# Patient Record
Sex: MALE | Race: WHITE | Employment: OTHER | URBAN - METROPOLITAN AREA
[De-identification: names, ages, dates, MRNs, and addresses within clinical notes are randomized per-mention and may not be internally consistent; named-entity substitution may affect disease eponyms.]

---

## 2017-02-13 PROBLEM — N52.9 VASCULOGENIC ERECTILE DYSFUNCTION: Status: ACTIVE | Noted: 2017-02-13

## 2018-12-20 ENCOUNTER — TELEPHONE (OUTPATIENT)
Dept: CASE MANAGEMENT | Age: 53
End: 2018-12-20

## 2018-12-20 NOTE — TELEPHONE ENCOUNTER
Outreach phone call to patient for the purposes of discussing their overdue status for colorectal cancer screening. We discussed the importance of this screening, and options for screening. This patient already has a screening kit in their home from a previous order. Discussed with patient the importance of completing the kit and mailing it back in.

## 2020-08-26 PROBLEM — E66.01 SEVERE OBESITY (HCC): Status: ACTIVE | Noted: 2020-08-26

## 2020-09-03 ENCOUNTER — HOSPITAL ENCOUNTER (OUTPATIENT)
Dept: LAB | Age: 55
Discharge: HOME | End: 2020-09-03
Payer: MEDICARE

## 2020-09-03 DIAGNOSIS — E78.5 DYSLIPIDEMIA: Chronic | ICD-10-CM

## 2020-09-03 LAB
CHOLEST SERPL-MCNC: 227 MG/DL
HDLC SERPL-MCNC: 36 MG/DL (ref 40–60)
HDLC SERPL: 6.3 {RATIO}
LDLC SERPL CALC-MCNC: 158.8 MG/DL
LIPID PROFILE,FLP: ABNORMAL
TRIGL SERPL-MCNC: 161 MG/DL (ref 35–150)
VLDLC SERPL CALC-MCNC: 32.2 MG/DL (ref 6–23)

## 2020-09-03 PROCEDURE — 36415 COLL VENOUS BLD VENIPUNCTURE: CPT

## 2020-09-03 PROCEDURE — 80061 LIPID PANEL: CPT

## 2021-04-16 ENCOUNTER — HOSPITAL ENCOUNTER (INPATIENT)
Age: 56
LOS: 7 days | Discharge: HOME HEALTH | DRG: 234 | End: 2021-04-23
Attending: INTERNAL MEDICINE | Admitting: THORACIC SURGERY (CARDIOTHORACIC VASCULAR SURGERY)
Payer: MEDICARE

## 2021-04-16 DIAGNOSIS — Z95.1 S/P CABG X 3: ICD-10-CM

## 2021-04-16 DIAGNOSIS — I25.110 CORONARY ARTERY DISEASE INVOLVING NATIVE CORONARY ARTERY OF NATIVE HEART WITH UNSTABLE ANGINA PECTORIS (HCC): Chronic | ICD-10-CM

## 2021-04-16 DIAGNOSIS — J98.11 ATELECTASIS, BILATERAL: ICD-10-CM

## 2021-04-16 DIAGNOSIS — I11.0 HYPERTENSIVE HEART DISEASE WITH HEART FAILURE (HCC): Chronic | ICD-10-CM

## 2021-04-16 DIAGNOSIS — Z99.11 ENCOUNTER FOR WEANING FROM VENTILATOR (HCC): ICD-10-CM

## 2021-04-16 DIAGNOSIS — I25.5 ISCHEMIC CARDIOMYOPATHY: ICD-10-CM

## 2021-04-16 PROBLEM — I21.3 STEMI (ST ELEVATION MYOCARDIAL INFARCTION) (HCC): Status: ACTIVE | Noted: 2021-04-16

## 2021-04-16 PROBLEM — I10 HYPERTENSION: Status: ACTIVE | Noted: 2021-04-16

## 2021-04-16 LAB
ALBUMIN SERPL-MCNC: 2.7 G/DL (ref 3.5–5)
ALBUMIN/GLOB SERPL: 0.6 {RATIO} (ref 1.2–3.5)
ALP SERPL-CCNC: 58 U/L (ref 50–136)
ALT SERPL-CCNC: 48 U/L (ref 12–65)
ANION GAP SERPL CALC-SCNC: 7 MMOL/L (ref 7–16)
AST SERPL-CCNC: 33 U/L (ref 15–37)
BASOPHILS # BLD: 0 K/UL (ref 0–0.2)
BASOPHILS NFR BLD: 0 % (ref 0–2)
BILIRUB SERPL-MCNC: 0.2 MG/DL (ref 0.2–1.1)
BUN SERPL-MCNC: 13 MG/DL (ref 6–23)
CALCIUM SERPL-MCNC: 8.4 MG/DL (ref 8.3–10.4)
CHLORIDE SERPL-SCNC: 110 MMOL/L (ref 98–107)
CO2 SERPL-SCNC: 24 MMOL/L (ref 21–32)
CREAT SERPL-MCNC: 0.74 MG/DL (ref 0.8–1.5)
DIFFERENTIAL METHOD BLD: ABNORMAL
EOSINOPHIL # BLD: 0 K/UL (ref 0–0.8)
EOSINOPHIL NFR BLD: 0 % (ref 0.5–7.8)
ERYTHROCYTE [DISTWIDTH] IN BLOOD BY AUTOMATED COUNT: 13.7 % (ref 11.9–14.6)
GLOBULIN SER CALC-MCNC: 4.2 G/DL (ref 2.3–3.5)
GLUCOSE SERPL-MCNC: 154 MG/DL (ref 65–100)
HCT VFR BLD AUTO: 37.4 % (ref 41.1–50.3)
HGB BLD-MCNC: 12.1 G/DL (ref 13.6–17.2)
IMM GRANULOCYTES # BLD AUTO: 0 K/UL (ref 0–0.5)
IMM GRANULOCYTES NFR BLD AUTO: 0 % (ref 0–5)
LYMPHOCYTES # BLD: 2.4 K/UL (ref 0.5–4.6)
LYMPHOCYTES NFR BLD: 31 % (ref 13–44)
MCH RBC QN AUTO: 29.2 PG (ref 26.1–32.9)
MCHC RBC AUTO-ENTMCNC: 32.4 G/DL (ref 31.4–35)
MCV RBC AUTO: 90.1 FL (ref 79.6–97.8)
MONOCYTES # BLD: 0.7 K/UL (ref 0.1–1.3)
MONOCYTES NFR BLD: 9 % (ref 4–12)
NEUTS SEG # BLD: 4.5 K/UL (ref 1.7–8.2)
NEUTS SEG NFR BLD: 60 % (ref 43–78)
NRBC # BLD: 0 K/UL (ref 0–0.2)
PLATELET # BLD AUTO: 249 K/UL (ref 150–450)
PMV BLD AUTO: 8.7 FL (ref 9.4–12.3)
POTASSIUM SERPL-SCNC: 3.6 MMOL/L (ref 3.5–5.1)
PROT SERPL-MCNC: 6.9 G/DL (ref 6.3–8.2)
RBC # BLD AUTO: 4.15 M/UL (ref 4.23–5.6)
SODIUM SERPL-SCNC: 141 MMOL/L (ref 136–145)
TROPONIN-HIGH SENSITIVITY: ABNORMAL PG/ML (ref 0–14)
UFH PPP CHRO-ACNC: 0.16 IU/ML (ref 0.3–0.7)
WBC # BLD AUTO: 7.6 K/UL (ref 4.3–11.1)

## 2021-04-16 PROCEDURE — C1760 CLOSURE DEV, VASC: HCPCS

## 2021-04-16 PROCEDURE — B2181ZZ FLUOROSCOPY OF LEFT INTERNAL MAMMARY BYPASS GRAFT USING LOW OSMOLAR CONTRAST: ICD-10-PCS | Performed by: INTERNAL MEDICINE

## 2021-04-16 PROCEDURE — 85520 HEPARIN ASSAY: CPT

## 2021-04-16 PROCEDURE — 84484 ASSAY OF TROPONIN QUANT: CPT

## 2021-04-16 PROCEDURE — 74011250637 HC RX REV CODE- 250/637: Performed by: PHYSICIAN ASSISTANT

## 2021-04-16 PROCEDURE — 93459 L HRT ART/GRFT ANGIO: CPT

## 2021-04-16 PROCEDURE — C8929 TTE W OR WO FOL WCON,DOPPLER: HCPCS

## 2021-04-16 PROCEDURE — 65610000006 HC RM INTENSIVE CARE

## 2021-04-16 PROCEDURE — 2709999900 HC NON-CHARGEABLE SUPPLY

## 2021-04-16 PROCEDURE — 77030016699 HC CATH ANGI DX INFN1 CARD -A

## 2021-04-16 PROCEDURE — 74011000636 HC RX REV CODE- 636: Performed by: INTERNAL MEDICINE

## 2021-04-16 PROCEDURE — 74011250636 HC RX REV CODE- 250/636: Performed by: INTERNAL MEDICINE

## 2021-04-16 PROCEDURE — 93459 L HRT ART/GRFT ANGIO: CPT | Performed by: INTERNAL MEDICINE

## 2021-04-16 PROCEDURE — B2111ZZ FLUOROSCOPY OF MULTIPLE CORONARY ARTERIES USING LOW OSMOLAR CONTRAST: ICD-10-PCS | Performed by: INTERNAL MEDICINE

## 2021-04-16 PROCEDURE — 77010033678 HC OXYGEN DAILY

## 2021-04-16 PROCEDURE — 93458 L HRT ARTERY/VENTRICLE ANGIO: CPT

## 2021-04-16 PROCEDURE — 99153 MOD SED SAME PHYS/QHP EA: CPT

## 2021-04-16 PROCEDURE — 74011250636 HC RX REV CODE- 250/636: Performed by: NURSE PRACTITIONER

## 2021-04-16 PROCEDURE — 85025 COMPLETE CBC W/AUTO DIFF WBC: CPT

## 2021-04-16 PROCEDURE — 4A023N7 MEASUREMENT OF CARDIAC SAMPLING AND PRESSURE, LEFT HEART, PERCUTANEOUS APPROACH: ICD-10-PCS | Performed by: INTERNAL MEDICINE

## 2021-04-16 PROCEDURE — B2151ZZ FLUOROSCOPY OF LEFT HEART USING LOW OSMOLAR CONTRAST: ICD-10-PCS | Performed by: INTERNAL MEDICINE

## 2021-04-16 PROCEDURE — 74011250636 HC RX REV CODE- 250/636: Performed by: PHYSICIAN ASSISTANT

## 2021-04-16 PROCEDURE — 99152 MOD SED SAME PHYS/QHP 5/>YRS: CPT

## 2021-04-16 PROCEDURE — 80053 COMPREHEN METABOLIC PANEL: CPT

## 2021-04-16 PROCEDURE — 74011000250 HC RX REV CODE- 250: Performed by: INTERNAL MEDICINE

## 2021-04-16 PROCEDURE — 99152 MOD SED SAME PHYS/QHP 5/>YRS: CPT | Performed by: INTERNAL MEDICINE

## 2021-04-16 PROCEDURE — 75810000275 HC EMERGENCY DEPT VISIT NO LEVEL OF CARE

## 2021-04-16 PROCEDURE — C1894 INTRO/SHEATH, NON-LASER: HCPCS

## 2021-04-16 PROCEDURE — 99223 1ST HOSP IP/OBS HIGH 75: CPT | Performed by: INTERNAL MEDICINE

## 2021-04-16 RX ORDER — CARVEDILOL 3.12 MG/1
3.12 TABLET ORAL 2 TIMES DAILY
Status: DISCONTINUED | OUTPATIENT
Start: 2021-04-16 | End: 2021-04-17

## 2021-04-16 RX ORDER — LORAZEPAM 2 MG/ML
.5-1 INJECTION INTRAMUSCULAR
Status: DISCONTINUED | OUTPATIENT
Start: 2021-04-16 | End: 2021-04-19

## 2021-04-16 RX ORDER — NITROGLYCERIN 0.4 MG/1
0.4 TABLET SUBLINGUAL
Status: DISCONTINUED | OUTPATIENT
Start: 2021-04-16 | End: 2021-04-19

## 2021-04-16 RX ORDER — MORPHINE SULFATE 2 MG/ML
2 INJECTION, SOLUTION INTRAMUSCULAR; INTRAVENOUS
Status: DISCONTINUED | OUTPATIENT
Start: 2021-04-16 | End: 2021-04-19

## 2021-04-16 RX ORDER — SODIUM CHLORIDE 0.9 % (FLUSH) 0.9 %
5-40 SYRINGE (ML) INJECTION AS NEEDED
Status: DISCONTINUED | OUTPATIENT
Start: 2021-04-16 | End: 2021-04-20

## 2021-04-16 RX ORDER — FAMOTIDINE 20 MG/1
20 TABLET, FILM COATED ORAL 2 TIMES DAILY
Status: DISCONTINUED | OUTPATIENT
Start: 2021-04-17 | End: 2021-04-17

## 2021-04-16 RX ORDER — ASPIRIN 81 MG/1
81 TABLET ORAL DAILY
Status: DISCONTINUED | OUTPATIENT
Start: 2021-04-17 | End: 2021-04-23 | Stop reason: HOSPADM

## 2021-04-16 RX ORDER — ATORVASTATIN CALCIUM 80 MG/1
80 TABLET, FILM COATED ORAL DAILY
Status: DISCONTINUED | OUTPATIENT
Start: 2021-04-17 | End: 2021-04-19

## 2021-04-16 RX ORDER — HYDROCODONE BITARTRATE AND ACETAMINOPHEN 7.5; 325 MG/1; MG/1
1 TABLET ORAL
Status: DISCONTINUED | OUTPATIENT
Start: 2021-04-16 | End: 2021-04-19

## 2021-04-16 RX ORDER — LOSARTAN POTASSIUM 25 MG/1
25 TABLET ORAL DAILY
Status: DISCONTINUED | OUTPATIENT
Start: 2021-04-17 | End: 2021-04-23 | Stop reason: HOSPADM

## 2021-04-16 RX ORDER — LIDOCAINE HYDROCHLORIDE 10 MG/ML
1-20 INJECTION, SOLUTION EPIDURAL; INFILTRATION; INTRACAUDAL; PERINEURAL ONCE
Status: COMPLETED | OUTPATIENT
Start: 2021-04-16 | End: 2021-04-16

## 2021-04-16 RX ORDER — HEPARIN SODIUM 5000 [USP'U]/100ML
12-25 INJECTION, SOLUTION INTRAVENOUS
Status: DISCONTINUED | OUTPATIENT
Start: 2021-04-16 | End: 2021-04-19

## 2021-04-16 RX ORDER — HEPARIN SODIUM 5000 [USP'U]/ML
60 INJECTION, SOLUTION INTRAVENOUS; SUBCUTANEOUS ONCE
Status: COMPLETED | OUTPATIENT
Start: 2021-04-16 | End: 2021-04-16

## 2021-04-16 RX ORDER — NITROGLYCERIN 20 MG/100ML
0-20 INJECTION INTRAVENOUS
Status: DISCONTINUED | OUTPATIENT
Start: 2021-04-16 | End: 2021-04-16 | Stop reason: SDUPTHER

## 2021-04-16 RX ORDER — MIDAZOLAM HYDROCHLORIDE 1 MG/ML
.5-5 INJECTION, SOLUTION INTRAMUSCULAR; INTRAVENOUS
Status: DISCONTINUED | OUTPATIENT
Start: 2021-04-16 | End: 2021-04-16

## 2021-04-16 RX ORDER — HEPARIN SODIUM 5000 [USP'U]/100ML
12-25 INJECTION, SOLUTION INTRAVENOUS
Status: DISCONTINUED | OUTPATIENT
Start: 2021-04-16 | End: 2021-04-16 | Stop reason: SDUPTHER

## 2021-04-16 RX ORDER — AMIODARONE HYDROCHLORIDE 200 MG/1
600 TABLET ORAL EVERY 12 HOURS
Status: COMPLETED | OUTPATIENT
Start: 2021-04-18 | End: 2021-04-19

## 2021-04-16 RX ORDER — HEPARIN SODIUM 5000 [USP'U]/ML
20 INJECTION, SOLUTION INTRAVENOUS; SUBCUTANEOUS ONCE
Status: COMPLETED | OUTPATIENT
Start: 2021-04-16 | End: 2021-04-16

## 2021-04-16 RX ORDER — CEFAZOLIN SODIUM/WATER 2 G/20 ML
2 SYRINGE (ML) INTRAVENOUS
Status: COMPLETED | OUTPATIENT
Start: 2021-04-19 | End: 2021-04-19

## 2021-04-16 RX ORDER — NITROGLYCERIN 20 MG/100ML
0-200 INJECTION INTRAVENOUS
Status: DISCONTINUED | OUTPATIENT
Start: 2021-04-16 | End: 2021-04-20 | Stop reason: SDUPTHER

## 2021-04-16 RX ORDER — HEPARIN SODIUM 200 [USP'U]/100ML
2 INJECTION, SOLUTION INTRAVENOUS CONTINUOUS
Status: DISCONTINUED | OUTPATIENT
Start: 2021-04-16 | End: 2021-04-20

## 2021-04-16 RX ORDER — ACETAMINOPHEN 325 MG/1
650 TABLET ORAL
Status: DISCONTINUED | OUTPATIENT
Start: 2021-04-16 | End: 2021-04-19

## 2021-04-16 RX ADMIN — HYDROCODONE BITARTRATE AND ACETAMINOPHEN 1 TABLET: 7.5; 325 TABLET ORAL at 11:59

## 2021-04-16 RX ADMIN — NITROGLYCERIN 10 MCG/MIN: 20 INJECTION INTRAVENOUS at 10:49

## 2021-04-16 RX ADMIN — HYDROCODONE BITARTRATE AND ACETAMINOPHEN 1 TABLET: 7.5; 325 TABLET ORAL at 21:09

## 2021-04-16 RX ADMIN — IOPAMIDOL 90 ML: 755 INJECTION, SOLUTION INTRAVENOUS at 10:49

## 2021-04-16 RX ADMIN — HEPARIN SODIUM 6000 UNITS: 5000 INJECTION INTRAVENOUS; SUBCUTANEOUS at 13:20

## 2021-04-16 RX ADMIN — CARVEDILOL 3.12 MG: 3.12 TABLET, FILM COATED ORAL at 17:12

## 2021-04-16 RX ADMIN — HYDROCODONE BITARTRATE AND ACETAMINOPHEN 1 TABLET: 7.5; 325 TABLET ORAL at 17:12

## 2021-04-16 RX ADMIN — HEPARIN SODIUM 2000 UNITS: 5000 INJECTION INTRAVENOUS; SUBCUTANEOUS at 21:08

## 2021-04-16 RX ADMIN — HEPARIN SODIUM 12 UNITS/KG/HR: 5000 INJECTION, SOLUTION INTRAVENOUS at 10:48

## 2021-04-16 RX ADMIN — MIDAZOLAM 2 MG: 1 INJECTION INTRAMUSCULAR; INTRAVENOUS at 10:31

## 2021-04-16 RX ADMIN — MORPHINE SULFATE 2 MG: 2 INJECTION, SOLUTION INTRAMUSCULAR; INTRAVENOUS at 18:15

## 2021-04-16 RX ADMIN — PERFLUTREN 1 ML: 6.52 INJECTION, SUSPENSION INTRAVENOUS at 14:00

## 2021-04-16 RX ADMIN — LIDOCAINE HYDROCHLORIDE 10 ML: 10 INJECTION, SOLUTION EPIDURAL; INFILTRATION; INTRACAUDAL; PERINEURAL at 10:30

## 2021-04-16 RX ADMIN — LORAZEPAM 1 MG: 2 INJECTION INTRAMUSCULAR; INTRAVENOUS at 21:41

## 2021-04-16 RX ADMIN — HEPARIN SODIUM 2 UNITS/HR: 5000 INJECTION, SOLUTION INTRAVENOUS; SUBCUTANEOUS at 10:30

## 2021-04-16 NOTE — PROGRESS NOTES
Dual skin assessment completed upon pt's arrival to unit. Sacrum and heels with no redness or breakdown noted. R groin puncture site with no bleeding or hematoma noted. 4x4 and tegaderm dressing c/d/i. Multiple tattoos, no other abnormalities noted.

## 2021-04-16 NOTE — PROGRESS NOTES
Kimberly Huerta. Melba Sheer, MD Arna Fabry. Gilford Heady, MD           Consult Note    Kym Suazo         4/16/2021 1965    REFERRING PHYSICIAN:  Dr. Shashi Feldman:  The patient is a 61 y.o. male who was admitted for STEMI (ST elevation myocardial infarction) ([facility] [address] [I21.3]. He presented to urgent care this morning. He states chest pain started on Monday. He attributed symptoms to GERD and did not think it was his heart. He also had an intermittent fever and just didn't feel right. He had a COVID test which was negative. He continued to have intermittent chest discomfort. He call [facility] who recommended either urgent care or ER for evaluation. This morning, he had more severe chest pain. He presented to urgent care where EKG showed inferior ST elevation. EMS was summoned and [place] [facility] He underwent emergent cardiac catheterization that showed severe LM stenosis as well as high grade stenosis is the LCx and RCA. His LIMA to LAD was patent. He had off pump LIMA to LAD in 1995 in setting of MI and has had PCI since. He is followed in the office by Dr Rosalba Santiago.  Echo in 9/20 showed reduced LV EF at 35-40%    Risk factors include HTN, dyslipidemia    ** No history of stroke, TIA, prior vascular surgery, anesthetic complication, lung disease, DVT or PE, GI bleeding       Past Medical History:   Diagnosis Date    CAD (coronary artery disease)     MI 1997, CABG 1997, STENTS 2009    Chest pain 4/29/2016    Chronic pain     DizzinessVertigo 4/29/2016    GERD (gastroesophageal reflux disease)     Heart failure ([facility] [address]     Myocardial infarction, anterior wall/, subsequent ([address] 4/29/2016    Other ill-defined conditions(799.89)     ISCHEMIC CARDIOMYOPATHY EF 35% 2010    Unstable angina ([address] 12/13/2010    Unstale Angina Acute coronary syndrome ([address] 4/29/2016       Past Surgical History:   Procedure Laterality Date    CARDIAC SURG PROCEDURE UNLIST      cabg x1    CARDIAC SURG PROCEDURE UNLIST      stent x4    HX HEART CATHETERIZATION  7/18/2012    no intervention    HX ORTHOPAEDIC      plate n screws in L arm  surgery on R leg       Family History   Problem Relation Age of Onset    Heart Disease Mother [de-identified]        STENTS HEART    Diabetes Mother     Heart Disease Father     Cancer Father         lung and prostate cancer    Diabetes Father     Heart Disease Brother     Diabetes Maternal Grandmother     Hypertension Maternal Grandmother     Heart Disease Paternal Grandfather        Social History     Socioeconomic History    Marital status:      Spouse name: Not on file    Number of children: Not on file    Years of education: Not on file    Highest education level: Not on file   Occupational History    Not on file   Social Needs    Financial resource strain: Not on file    Food insecurity     Worry: Not on file     Inability: Not on file    Transportation needs     Medical: Not on file     Non-medical: Not on file   Tobacco Use    Smoking status: Never Smoker    Smokeless tobacco: Never Used   Substance and Sexual Activity    Alcohol use: Yes     Comment: social    Drug use: No    Sexual activity: Never   Lifestyle    Physical activity     Days per week: Not on file     Minutes per session: Not on file    Stress: Not on file   Relationships    Social connections     Talks on phone: Not on file     Gets together: Not on file     Attends Hoahaoism service: Not on file     Active member of club or organization: Not on file     Attends meetings of clubs or organizations: Not on file     Relationship status: Not on file    Intimate partner violence     Fear of current or ex partner: Not on file     Emotionally abused: Not on file     Physically abused: Not on file     Forced sexual activity: Not on file   Other Topics Concern    Not on file   Social History Narrative    Not on file       Allergies   Allergen Reactions    Codeine Rash    Nexium [Esomeprazole Magnesium] Other (comments)     headache    Niacin Other (comments)     SEVERE FLUSHING         No current facility-administered medications on file prior to encounter. Current Outpatient Medications on File Prior to Encounter   Medication Sig Dispense Refill    aspirin delayed-release 81 mg tablet Take  by mouth daily.  nebivoloL (BYSTOLIC) 2.5 mg tablet Take 1 Tab by mouth daily. 30 Tab 11    sildenafil citrate (Viagra) 50 mg tablet Take 50 mg by mouth as needed for Erectile Dysfunction.  nitroglycerin (NITROSTAT) 0.4 mg SL tablet 1 Tab by SubLINGual route every five (5) minutes as needed (If no relief after 3rd tab call 911). 100 Tab 0    atorvastatin (Lipitor) 80 mg tablet Take 1 Tab by mouth daily. 90 Tab 3    losartan (COZAAR) 25 mg tablet Take 1 Tab by mouth daily. 30 Tab 11    multivitamin capsule Take 1 Cap by mouth daily. REVIEW OF SYSTEMS:  Review of Systems   Constitution: Positive for chills, fever and malaise/fatigue. Negative for weight gain and weight loss. HENT: Negative for ear pain, hearing loss, nosebleeds, sore throat and tinnitus. Eyes: Negative for blurred vision, vision loss in left eye and vision loss in right eye. Cardiovascular: Positive for chest pain. Negative for dyspnea on exertion, leg swelling, near-syncope, orthopnea, palpitations, paroxysmal nocturnal dyspnea and syncope. Respiratory: Negative for cough, hemoptysis, shortness of breath, sputum production and wheezing. Endocrine: Negative for cold intolerance, heat intolerance and polydipsia. Hematologic/Lymphatic: Does not bruise/bleed easily. Skin: Negative for color change and rash. Musculoskeletal: Negative for back pain, joint pain, joint swelling and myalgias. Gastrointestinal: Negative for abdominal pain, constipation, diarrhea, dysphagia, heartburn, hematemesis, melena, nausea and vomiting.    Genitourinary: Negative for dysuria, frequency, hematuria and urgency. Neurological: Negative for difficulty with concentration, dizziness, headaches, light-headedness, numbness, paresthesias, seizures, vertigo and weakness. Psychiatric/Behavioral: Negative for altered mental status and depression. Physical Exam  Vitals:    04/16/21 1230 04/16/21 1245 04/16/21 1300 04/16/21 1320   BP: 115/77 115/80 (!) 129/93 118/80   Pulse: 86 87 82 81   Resp:    26   Temp:    98 °F (36.7 °C)   SpO2: 97% 97% 97% 98%   Weight:       Height:           Physical Exam:  General: Well Developed, Well Nourished, No Acute Distress  HEENT: Normocephalic, pupils equal and round, no scleral icterus  Neck: supple, no JVD  Chest wall: No deformity  Heart: S1S2 with RRR without murmurs or gallops  Lungs: Clear throughout auscultation bilaterally  Vascular: Pulses are full and equal. There is no venous stasis disease. Abd: soft, nontender, nondistended, with good bowel sounds, no pulsatile masses  Ext: warm, no edema, calves supple/nontender, pulses 2+ bilaterally  Skin: warm and dry, no rashes, cyanosis, jaundice, ecchymoses or evidence of skin breakdown  Psychiatric: Normal mood and affect  Neurologic: Alert and oriented X 3, no focal deficit noted    Labs: pending    Assessment:     Principal Problem:    STEMI (ST elevation myocardial infarction) ([facility] [address] (4/16/2021)  Active Problems:    CAD (coronary artery disease), native coronary artery (12/18/2009)    Ischemic cardiomyopathy (9/19/2016)    Hypertension (4/16/2021)    Dyslipidemia      Plan:     Trena Chand is to see preoperative teaching film that thoroughly discusses procedure, risks, and possible complications. Risks, benefits, and alternatives were discussed to include, but not limited to:     1. Bleeding  2. Arrhythmia   3. Infection including mediastinitis  4. Myocardial infarction  5. Need for reoperation  6. Renal failure  7. Respiratory failure  8. Stroke  9. Potential death    Repeat echocardiogram is pending.    He will be monitored closely in the CVICU with redo CABG surgery planned for Monday.        Mehrdad Romero MD

## 2021-04-16 NOTE — PROGRESS NOTES
TRANSFER - OUT REPORT:  STEMI/LHC by Dr. Nathan Valiente  Right femoral access  No interventions  CT Surgery consulted  RFA closed with angioseal  Versed 2mg IV  Nitro gtt started @10mcg/min  Heparin gtt started @ 12units/kg/hr  Access site soft clean, dry, and intact; with no signs of bleeding or hematoma  Pt. Tolerated procedure  Reports chest pain @ zero/10  Verbal report given to rossy(name) on Feldman Meet  being transferred to [facility]U(unit) for routine progression of care       Report consisted of patients Situation, Background, Assessment and   Recommendations(SBAR). Information from the following report(s) Procedure Summary and MAR was reviewed with the receiving nurse. Lines:   Peripheral IV 04/16/21 (Active)        Opportunity for questions and clarification was provided.       Patient transported with:   Monitor  Registered Nurse  Tech

## 2021-04-16 NOTE — PROGRESS NOTES
Chart screened by  for discharge planning. Pt with CV surgery scheduled for tentatively Monday 4/18. No needs identified at this time. Please consult  if any new issues arise.       Care Management Interventions  Transition of Care Consult (CM Consult): Discharge Planning

## 2021-04-16 NOTE — PROGRESS NOTES
TRANSFER - IN REPORT:    Verbal report received from [facility], RN on Ricki Philip  being received from  for routine progression of care      Report consisted of patients Situation, Background, Assessment and   Recommendations(SBAR). Information from the following report(s) SBAR, Procedure Summary, MAR and Recent Results was reviewed with the receiving nurse. Opportunity for questions and clarification was provided. Assessment completed upon patients arrival to unit and care assumed. Right groin dry and intact. No hematoma noted. Patient is alert and oriented.

## 2021-04-16 NOTE — PROGRESS NOTES
TRANSFER - OUT REPORT:    Verbal report given to [facility], [facility], RN on Rk Grief  being transferred to CVICU for routine progression of care       Report consisted of patients Situation, Background, Assessment and   Recommendations(SBAR). Information from the following report(s) SBAR, Procedure Summary, MAR and Recent Results was reviewed with the receiving nurse. Lines:   Peripheral IV 04/16/21 (Active)        Opportunity for questions and clarification was provided. Patient transported with:   Registered Nurse          Right groin dressing dry and intact. No hematoma noted. Patient is alert and oriented.

## 2021-04-16 NOTE — PROGRESS NOTES
Claudia Stallings. MD Missy Gutiérrez. Rain Ramos MD           MANAGEMENT PLAN    [place] Pr[place]         4/16/2021 1965    REFERRING PHYSICIAN:  Dr. Herman Santillan:  The patient is a 61 y.o. male who was admitted for STEMI (ST elevation myocardial infarction) ([address] [I21.3]. He presented to urgent care this morning. He states chest pain started on Monday. He attributed symptoms to GERD and did not think it was his heart. He also had an intermittent fever and just didn't feel right. He had a COVID test which was negative. He continued to have intermittent chest discomfort. He call [facility] who recommended either urgent care or ER for evaluation. This morning, he had more severe chest pain. He presented to urgent care where EKG showed inferior ST elevation. EMS was summoned and he was transported to [facility] [place]. He underwent emergent cardiac catheterization that showed severe LM stenosis as well as high grade stenosis is the LCx and RCA. His LIMA to LAD was patent. He had off pump LIMA to LAD in 1995 in setting of MI and has had PCI since. He is followed in the office by Dr Zeus Tan.  Echo in 9/20 showed reduced LV EF at 35-40%    Risk factors include HTN, dyslipidemia    ** No history of stroke, TIA, prior vascular surgery, anesthetic complication, lung disease, DVT or PE, GI bleeding       Past Medical History:   Diagnosis Date    CAD (coronary artery disease)     MI 1997, CABG 1997, STENTS 2009    Chest pain 4/29/2016    Chronic pain     DizzinessVertigo 4/29/2016    GERD (gastroesophageal reflux disease)     Heart failure ([address]     Myocardial infarction, anterior wall/, subsequent ([address] 4/29/2016    Other ill-defined conditions(799.89)     ISCHEMIC CARDIOMYOPATHY EF 35% 2010    Unstable angina ([address] 12/13/2010    Unstale Angina Acute coronary syndrome ([address] 4/29/2016       Past Surgical History:   Procedure Laterality Date    CARDIAC SURG PROCEDURE UNLIST      cabg x1  CARDIAC SURG PROCEDURE UNLIST      stent x4    HX HEART CATHETERIZATION  7/18/2012    no intervention    HX ORTHOPAEDIC      plate n screws in L arm  surgery on R leg       Family History   Problem Relation Age of Onset    Heart Disease Mother [de-identified]        STENTS HEART    Diabetes Mother     Heart Disease Father     Cancer Father         lung and prostate cancer    Diabetes Father     Heart Disease Brother     Diabetes Maternal Grandmother     Hypertension Maternal Grandmother     Heart Disease Paternal Grandfather        Social History     Socioeconomic History    Marital status:      Spouse name: Not on file    Number of children: Not on file    Years of education: Not on file    Highest education level: Not on file   Occupational History    Not on file   Social Needs    Financial resource strain: Not on file    Food insecurity     Worry: Not on file     Inability: Not on file    Transportation needs     Medical: Not on file     Non-medical: Not on file   Tobacco Use    Smoking status: Never Smoker    Smokeless tobacco: Never Used   Substance and Sexual Activity    Alcohol use: Yes     Comment: social    Drug use: No    Sexual activity: Never   Lifestyle    Physical activity     Days per week: Not on file     Minutes per session: Not on file    Stress: Not on file   Relationships    Social connections     Talks on phone: Not on file     Gets together: Not on file     Attends Caodaism service: Not on file     Active member of club or organization: Not on file     Attends meetings of clubs or organizations: Not on file     Relationship status: Not on file    Intimate partner violence     Fear of current or ex partner: Not on file     Emotionally abused: Not on file     Physically abused: Not on file     Forced sexual activity: Not on file   Other Topics Concern    Not on file   Social History Narrative    Not on file       Allergies   Allergen Reactions    Codeine Rash    Nexium [Esomeprazole Magnesium] Other (comments)     headache    Niacin Other (comments)     SEVERE FLUSHING         No current facility-administered medications on file prior to encounter. Current Outpatient Medications on File Prior to Encounter   Medication Sig Dispense Refill    aspirin delayed-release 81 mg tablet Take  by mouth daily.  nebivoloL (BYSTOLIC) 2.5 mg tablet Take 1 Tab by mouth daily. 30 Tab 11    sildenafil citrate (Viagra) 50 mg tablet Take 50 mg by mouth as needed for Erectile Dysfunction.  nitroglycerin (NITROSTAT) 0.4 mg SL tablet 1 Tab by SubLINGual route every five (5) minutes as needed (If no relief after 3rd tab call 911). 100 Tab 0    atorvastatin (Lipitor) 80 mg tablet Take 1 Tab by mouth daily. 90 Tab 3    losartan (COZAAR) 25 mg tablet Take 1 Tab by mouth daily. 30 Tab 11    multivitamin capsule Take 1 Cap by mouth daily. REVIEW OF SYSTEMS:  Review of Systems   Constitution: Positive for chills, fever and malaise/fatigue. Negative for weight gain and weight loss. HENT: Negative for ear pain, hearing loss, nosebleeds, sore throat and tinnitus. Eyes: Negative for blurred vision, vision loss in left eye and vision loss in right eye. Cardiovascular: Positive for chest pain. Negative for dyspnea on exertion, leg swelling, near-syncope, orthopnea, palpitations, paroxysmal nocturnal dyspnea and syncope. Respiratory: Negative for cough, hemoptysis, shortness of breath, sputum production and wheezing. Endocrine: Negative for cold intolerance, heat intolerance and polydipsia. Hematologic/Lymphatic: Does not bruise/bleed easily. Skin: Negative for color change and rash. Musculoskeletal: Negative for back pain, joint pain, joint swelling and myalgias. Gastrointestinal: Negative for abdominal pain, constipation, diarrhea, dysphagia, heartburn, hematemesis, melena, nausea and vomiting.    Genitourinary: Negative for dysuria, frequency, hematuria and urgency. Neurological: Negative for difficulty with concentration, dizziness, headaches, light-headedness, numbness, paresthesias, seizures, vertigo and weakness. Psychiatric/Behavioral: Negative for altered mental status and depression. Physical Exam  Vitals:    04/16/21 1215 04/16/21 1230 04/16/21 1245 04/16/21 1300   BP: 118/75 115/77 115/80 (!) 129/93   Pulse: 84 86 87 82   SpO2: 97% 97% 97% 97%   Weight:       Height:           Physical Exam:  General: Well Developed, Well Nourished, No Acute Distress  HEENT: Normocephalic, pupils equal and round, no scleral icterus  Neck: supple, no JVD  Chest wall: No deformity  Heart: S1S2 with RRR without murmurs or gallops  Lungs: Clear throughout auscultation bilaterally  Vascular: Pulses are full and equal. There is no venous stasis disease. Abd: soft, nontender, nondistended, with good bowel sounds, no pulsatile masses  Ext: warm, no edema, calves supple/nontender, pulses 2+ bilaterally  Skin: warm and dry, no rashes, cyanosis, jaundice, ecchymoses or evidence of skin breakdown  Psychiatric: Normal mood and affect  Neurologic: Alert and oriented X 3, no focal deficit noted    Labs: pending    Assessment:     Principal Problem:    STEMI (ST elevation myocardial infarction) ([facility] [address] (4/16/2021)  Active Problems:    CAD (coronary artery disease), native coronary artery (12/18/2009)    Ischemic cardiomyopathy (9/19/2016)    Hypertension (4/16/2021)    Dyslipidemia      Plan:     lEisabeth Banks is to see preoperative teaching film that thoroughly discusses procedure, risks, and possible complications. Risks, benefits, and alternatives were discussed to include, but not limited to:     1. Bleeding  2. Arrhythmia   3. Infection including mediastinitis  4. Myocardial infarction  5. Need for reoperation  6. Renal failure  7. Respiratory failure  8. Stroke  9. Potential death    Repeat echocardiogram is pending.    He will be monitored closely in the CVICU with redo CABG surgery planned for Monday.        Farheen Benítez PA-C

## 2021-04-16 NOTE — PROCEDURES
Cardiac Catheterization Procedure Note    Patient ID:     Name: Mey Messer   Medical Record Number: [id number]   YOB: 1965    Date of Procedure: 4/16/2021     Pre-procedure Diagnosis:  Unstable Angina    Post-procedure Diagnosis: Coronary Artery Disease    Reason for Procedure: ACS > 24 Hours    Blood loss less than 5 ml    Sedation. Pt received 2 mg versed and 0 mcg fentanyl for monitored conscious sedation from 1030to 1050. independent trained observer to assist in the monitoring of the patient's level of consciousness and physiological status was present    Nurse [facility]    Specimen: None    No complications    No assistants    Time out, Mallampati, and ASA performed    Procedure:  After informed consent, patient was prepped and draped in the usual sterile fashion. Right femoraL approach was used. 90cc Visipaque contrast were utilized for the entire procedure. angioseal closure device used        FINDINGS    Left Ventricle: 40  LVEDP: 24    Left Main:99%    Left Anterior descending coronary artery: occluded       Left Circumflex coronary artery: severe prox disease        Right coronary artery: severe prox disease            Graft anatomy: lima to lad patent    Intervention if done: na    Conclusions: severe multivessel disease    Recommentations: cabg    No complications    Family and or significant other were sought out and discussion of the procedure and findings took place. Procedure and findings including pertinent sequele were discussed with the patient immediately post procedure. Opportunity to ask questions was offered. If no one was available in the post procedure waiting area, I can be reached thru paging system or at [phone].           Signed By: Olivia Mathis MD

## 2021-04-16 NOTE — PROGRESS NOTES
TRANSFER - IN REPORT:    Verbal report received from [address] [place] [address] (name) on Apoorva Hdez  being received from [facility] (unit) for routine progression of care      Report consisted of patients Situation, Background, Assessment and   Recommendations(SBAR). Information from the following report(s) SBAR, Kardex, Procedure Summary, Intake/Output, MAR and Accordion was reviewed with the receiving nurse. Opportunity for questions and clarification was provided. Assessment completed upon patients arrival to unit and care assumed.

## 2021-04-16 NOTE — H&P
[facility] CARDIOLOGY History &Physical                 Primary Cardiologist: Dr Jeannine Bright    Primary Care Physician: Bong Peace MD    Admitting Physician: Dr Cody Norton:     Patient is a 61 y.o. male who presents with chest pain. He has a h/o CAD w AMA in [address],[facility], CABG in 1997 w LIMA to LAD. LHC in 2012 w patent LIMA to LAD, mod diffuse atherosclerotic narrowing. [facility] 2016 w apical infarction. Echo 9-2020 w EF 35-40% w global hypokinesis and WMA. He has had pain for a week, severe a week ago, it got better w baking soda and nitro and went away. He feels like he has had a fever. However he woke again Monday at 2 Am w severe CP. Covid test negative. Pt had continued severe chest pain w diaphoresis and went to Urgent care. CP has been tightness and felt like an elephant sitting on his chest today w SOB and nausea. Decreased appetite and fatigue. EKG showed inferior STEMI and pt transported emergently to [facility] ER where he was transported for emergent LHC.      Past Medical History:   Diagnosis Date    CAD (coronary artery disease)     MI 0, CABG 1997, STENTS 2009    Chest pain 4/29/2016    Chronic pain     DizzinessVertigo 4/29/2016    GERD (gastroesophageal reflux disease)     Heart failure ([address]     Myocardial infarction, anterior wall/, subsequent ([address] 4/29/2016    Other ill-defined conditions(799.89)     ISCHEMIC CARDIOMYOPATHY EF 35% 2010    Unstable angina ([address] 12/13/2010    Unstale Angina Acute coronary syndrome ([address] 4/29/2016      Past Surgical History:   Procedure Laterality Date    CARDIAC SURG PROCEDURE UNLIST      cabg x1    CARDIAC SURG PROCEDURE UNLIST      stent x4    HX HEART CATHETERIZATION  7/18/2012    no intervention    HX ORTHOPAEDIC      plate n screws in L arm  surgery on R leg      Allergies   Allergen Reactions    Codeine Rash    Nexium [Esomeprazole Magnesium] Other (comments)     headache    Niacin Other (comments)     SEVERE FLUSHING       Social History     Tobacco Use    Smoking status: Never Smoker    Smokeless tobacco: Never Used   Substance Use Topics    Alcohol use: Yes     Comment: social      FH:   Family History   Problem Relation Age of Onset    Heart Disease Mother [de-identified]        STENTS HEART    Diabetes Mother     Heart Disease Father     Cancer Father         lung and prostate cancer    Diabetes Father     Heart Disease Brother     Diabetes Maternal Grandmother     Hypertension Maternal Grandmother     Heart Disease Paternal Grandfather         Review of Systems  General: no weight change, + weakness, fever or chills  Skin: no rashes, lumps, or other skin changes  HEENT: no headache, dizziness, lightheadedness, vision changes, hearing changes, tinnitus, vertigo, sinus pressure/pain, bleeding gums, sore throat, or hoarseness  Neck: no swollen glands, goiter, pain or stiffness  Respiratory: no cough, sputum, hemoptysis, + dyspnea, no wheezing  Cardiovascular: + as per HPI  Gastrointestinal: no reflux, constipation, diarrhea, liver problems, GI bleeding  Urinary: no frequency, urgency , hematuria, burning/pain with urination, recent flank pain, polyuria, nocturia, or difficulty urinating  Peripheral Vascular: no claudication, leg cramps, prior DVTs, swelling of calves, legs, or feet, color change, or swelling with redness or tenderness  Musculoskeletal: no muscle or joint pain/stiffness, joint swelling, erythema of joints, or back pain  Psychiatric: no depression or excessive stress  Neurological: no sensory or motor loss, seizures, syncope, tremors, numbness, tingling, no changes in mood, attention, or speech, no changes in orientation, memory, insight, or judgment. Hematologic: no anemia, easy bruising or bleeding  Endocrine: no thyroid problems, no heat or cold intolerance, excessive sweating, polyuria, polydipsia, no diabetes.         Objective:       Visit Vitals  Ht 6' (1.829 m)   Wt 99.8 kg (220 lb)   BMI 29.84 kg/m²       No intake/output data recorded. No intake/output data recorded. Physical Exam:  General: Well Developed, Well Nourished, No Acute Distress  HEENT: pupils equal and round, no abnormalities noted  Neck: supple, no JVD, no carotid bruits  Heart: S1S2 with RRR without murmurs or gallops  Lungs: Clear throughout auscultation bilaterally without adventitious sounds  Abd: soft, nontender, nondistended, with good bowel sounds  Ext: warm, no edema, calves supple/nontender, pulses 2+ bilaterally  Skin: warm and dry  Psychiatric: Normal mood and affect  Neurologic: Alert and oriented X 3      ECG: NSR w rate 86 w inferior STEMI    Data Review:    No results found for this or any previous visit (from the past 24 hour(s)).   Labs pending    Assessment/Plan:   STEMI (ST elevation myocardial infarction) ([facility] (4/16/2021)- ASA, emergent LHC, cont ARB, statin, BB, check echo    CAD (coronary artery disease), native coronary artery (12/18/2009)- as above        Ischemic cardiomyopathy (9/19/2016)- EF 35-40%, cont ARB, BB, check echo    Hypertension (4/16/2021)- ARB, BB    Trupti Miranda PA-C  4/16/2021  10:31 AM

## 2021-04-17 ENCOUNTER — APPOINTMENT (OUTPATIENT)
Dept: ULTRASOUND IMAGING | Age: 56
DRG: 234 | End: 2021-04-17
Attending: PHYSICIAN ASSISTANT
Payer: MEDICARE

## 2021-04-17 ENCOUNTER — APPOINTMENT (OUTPATIENT)
Dept: GENERAL RADIOLOGY | Age: 56
DRG: 234 | End: 2021-04-17
Attending: PHYSICIAN ASSISTANT
Payer: MEDICARE

## 2021-04-17 LAB
ANION GAP SERPL CALC-SCNC: 6 MMOL/L (ref 7–16)
BASOPHILS # BLD: 0 K/UL (ref 0–0.2)
BASOPHILS NFR BLD: 0 % (ref 0–2)
BUN SERPL-MCNC: 12 MG/DL (ref 6–23)
CALCIUM SERPL-MCNC: 8.5 MG/DL (ref 8.3–10.4)
CHLORIDE SERPL-SCNC: 109 MMOL/L (ref 98–107)
CHOLEST SERPL-MCNC: 131 MG/DL
CO2 SERPL-SCNC: 25 MMOL/L (ref 21–32)
CREAT SERPL-MCNC: 0.72 MG/DL (ref 0.8–1.5)
DIFFERENTIAL METHOD BLD: ABNORMAL
EOSINOPHIL # BLD: 0 K/UL (ref 0–0.8)
EOSINOPHIL NFR BLD: 0 % (ref 0.5–7.8)
ERYTHROCYTE [DISTWIDTH] IN BLOOD BY AUTOMATED COUNT: 13.5 % (ref 11.9–14.6)
EST. AVERAGE GLUCOSE BLD GHB EST-MCNC: 123 MG/DL
GLUCOSE SERPL-MCNC: 109 MG/DL (ref 65–100)
HBA1C MFR BLD: 5.9 % (ref 4.2–6.3)
HCT VFR BLD AUTO: 37.7 % (ref 41.1–50.3)
HDLC SERPL-MCNC: 29 MG/DL (ref 40–60)
HDLC SERPL: 4.5 {RATIO}
HGB BLD-MCNC: 12.2 G/DL (ref 13.6–17.2)
IMM GRANULOCYTES # BLD AUTO: 0 K/UL (ref 0–0.5)
IMM GRANULOCYTES NFR BLD AUTO: 0 % (ref 0–5)
INR PPP: 1.1
LDLC SERPL CALC-MCNC: 70.8 MG/DL
LIPID PROFILE,FLP: ABNORMAL
LYMPHOCYTES # BLD: 1.7 K/UL (ref 0.5–4.6)
LYMPHOCYTES NFR BLD: 18 % (ref 13–44)
MAGNESIUM SERPL-MCNC: 2.2 MG/DL (ref 1.8–2.4)
MCH RBC QN AUTO: 29.3 PG (ref 26.1–32.9)
MCHC RBC AUTO-ENTMCNC: 32.4 G/DL (ref 31.4–35)
MCV RBC AUTO: 90.4 FL (ref 79.6–97.8)
MONOCYTES # BLD: 0.7 K/UL (ref 0.1–1.3)
MONOCYTES NFR BLD: 8 % (ref 4–12)
NEUTS SEG # BLD: 7 K/UL (ref 1.7–8.2)
NEUTS SEG NFR BLD: 74 % (ref 43–78)
NRBC # BLD: 0 K/UL (ref 0–0.2)
PLATELET # BLD AUTO: 250 K/UL (ref 150–450)
PMV BLD AUTO: 8.5 FL (ref 9.4–12.3)
POTASSIUM SERPL-SCNC: 4 MMOL/L (ref 3.5–5.1)
PROTHROMBIN TIME: 14.7 SEC (ref 12.5–14.7)
RBC # BLD AUTO: 4.17 M/UL (ref 4.23–5.6)
SODIUM SERPL-SCNC: 140 MMOL/L (ref 136–145)
TRIGL SERPL-MCNC: 156 MG/DL (ref 35–150)
TROPONIN-HIGH SENSITIVITY: ABNORMAL PG/ML (ref 0–14)
UFH PPP CHRO-ACNC: 0.14 IU/ML (ref 0.3–0.7)
UFH PPP CHRO-ACNC: 0.19 IU/ML (ref 0.3–0.7)
UFH PPP CHRO-ACNC: 0.3 IU/ML (ref 0.3–0.7)
VLDLC SERPL CALC-MCNC: 31.2 MG/DL (ref 6–23)
WBC # BLD AUTO: 9.5 K/UL (ref 4.3–11.1)

## 2021-04-17 PROCEDURE — 93880 EXTRACRANIAL BILAT STUDY: CPT

## 2021-04-17 PROCEDURE — 83735 ASSAY OF MAGNESIUM: CPT

## 2021-04-17 PROCEDURE — 74011250636 HC RX REV CODE- 250/636: Performed by: THORACIC SURGERY (CARDIOTHORACIC VASCULAR SURGERY)

## 2021-04-17 PROCEDURE — 74011250636 HC RX REV CODE- 250/636: Performed by: PHYSICIAN ASSISTANT

## 2021-04-17 PROCEDURE — 83036 HEMOGLOBIN GLYCOSYLATED A1C: CPT

## 2021-04-17 PROCEDURE — 85520 HEPARIN ASSAY: CPT

## 2021-04-17 PROCEDURE — 65610000006 HC RM INTENSIVE CARE

## 2021-04-17 PROCEDURE — 74011250637 HC RX REV CODE- 250/637: Performed by: THORACIC SURGERY (CARDIOTHORACIC VASCULAR SURGERY)

## 2021-04-17 PROCEDURE — 71045 X-RAY EXAM CHEST 1 VIEW: CPT

## 2021-04-17 PROCEDURE — 80048 BASIC METABOLIC PNL TOTAL CA: CPT

## 2021-04-17 PROCEDURE — 85025 COMPLETE CBC W/AUTO DIFF WBC: CPT

## 2021-04-17 PROCEDURE — 80061 LIPID PANEL: CPT

## 2021-04-17 PROCEDURE — 74011250637 HC RX REV CODE- 250/637: Performed by: PHYSICIAN ASSISTANT

## 2021-04-17 PROCEDURE — 99232 SBSQ HOSP IP/OBS MODERATE 35: CPT | Performed by: INTERNAL MEDICINE

## 2021-04-17 PROCEDURE — 85610 PROTHROMBIN TIME: CPT

## 2021-04-17 PROCEDURE — 74011250636 HC RX REV CODE- 250/636: Performed by: INTERNAL MEDICINE

## 2021-04-17 RX ORDER — HEPARIN SODIUM 5000 [USP'U]/ML
20 INJECTION, SOLUTION INTRAVENOUS; SUBCUTANEOUS ONCE
Status: COMPLETED | OUTPATIENT
Start: 2021-04-17 | End: 2021-04-17

## 2021-04-17 RX ORDER — HEPARIN SODIUM 5000 [USP'U]/ML
40 INJECTION, SOLUTION INTRAVENOUS; SUBCUTANEOUS ONCE
Status: COMPLETED | OUTPATIENT
Start: 2021-04-17 | End: 2021-04-17

## 2021-04-17 RX ORDER — CARVEDILOL 6.25 MG/1
6.25 TABLET ORAL EVERY 12 HOURS
Status: DISCONTINUED | OUTPATIENT
Start: 2021-04-17 | End: 2021-04-19

## 2021-04-17 RX ORDER — FAMOTIDINE 20 MG/1
20 TABLET, FILM COATED ORAL EVERY 12 HOURS
Status: DISCONTINUED | OUTPATIENT
Start: 2021-04-17 | End: 2021-04-19 | Stop reason: HOSPADM

## 2021-04-17 RX ORDER — CARVEDILOL 6.25 MG/1
6.25 TABLET ORAL 2 TIMES DAILY
Status: DISCONTINUED | OUTPATIENT
Start: 2021-04-17 | End: 2021-04-17

## 2021-04-17 RX ADMIN — FAMOTIDINE 20 MG: 20 TABLET, FILM COATED ORAL at 09:08

## 2021-04-17 RX ADMIN — HYDROCODONE BITARTRATE AND ACETAMINOPHEN 1 TABLET: 7.5; 325 TABLET ORAL at 14:53

## 2021-04-17 RX ADMIN — HEPARIN SODIUM 4000 UNITS: 5000 INJECTION INTRAVENOUS; SUBCUTANEOUS at 05:51

## 2021-04-17 RX ADMIN — FAMOTIDINE 20 MG: 20 TABLET, FILM COATED ORAL at 21:30

## 2021-04-17 RX ADMIN — HEPARIN SODIUM 18 UNITS/KG/HR: 5000 INJECTION, SOLUTION INTRAVENOUS at 05:01

## 2021-04-17 RX ADMIN — HEPARIN SODIUM 20 UNITS/KG/HR: 5000 INJECTION, SOLUTION INTRAVENOUS at 19:11

## 2021-04-17 RX ADMIN — HYDROCODONE BITARTRATE AND ACETAMINOPHEN 1 TABLET: 7.5; 325 TABLET ORAL at 09:08

## 2021-04-17 RX ADMIN — LOSARTAN POTASSIUM 25 MG: 25 TABLET, FILM COATED ORAL at 09:08

## 2021-04-17 RX ADMIN — MORPHINE SULFATE 2 MG: 2 INJECTION, SOLUTION INTRAMUSCULAR; INTRAVENOUS at 02:08

## 2021-04-17 RX ADMIN — CARVEDILOL 3.12 MG: 3.12 TABLET, FILM COATED ORAL at 09:08

## 2021-04-17 RX ADMIN — HEPARIN SODIUM 2000 UNITS: 5000 INJECTION INTRAVENOUS; SUBCUTANEOUS at 14:05

## 2021-04-17 RX ADMIN — ASPIRIN 81 MG: 81 TABLET ORAL at 09:08

## 2021-04-17 RX ADMIN — ATORVASTATIN CALCIUM 80 MG: 80 TABLET, FILM COATED ORAL at 09:08

## 2021-04-17 NOTE — PROCEDURES
[facility]  CARDIAC CATH    Name:  Hardik Gale  MR#:  [id number]  :  1965  ACCOUNT #:  [de-identified]  DATE OF SERVICE:  2021    PROCEDURES PERFORMED:  Left heart cath, selective coronary angiography, left ventriculogram with LIMA angiography. PREOPERATIVE DIAGNOSIS:  Acute coronary syndrome. POSTOPERATIVE DIAGNOSIS:  Multivessel coronary artery disease. SURGEON:  Nellie Fermin MD    ASSISTANT:  None. ESTIMATED BLOOD LOSS:  3cc. SPECIMENS REMOVED:  na.    COMPLICATIONS:  None. IMPLANTS:  na.    ANESTHESIA:  Sedation, 2 mg of Versed. The patient was monitored for appropriate physiologic response. EDP 24. START TIME:  10:30    END TIME: 10:50. NURSE:  Antonio Delaney. INDICATION:  Unstable acute coronary syndrome. TOTAL CONTRAST USED:  90 mL. ACCESS:  Right femoral.  Angio-Seal closure. FINDINGS:  Left ventriculogram done in LEE projection shows EF 35-40% with inferior and global hypokinesis. Left main arises normally, bifurcates into an LAD, ramus and circumflex system. The distal left main has a 95% very eccentric stenosis. LAD appears to be occluded proximally with some filling of a very high diagonal.    Ramus artery arises off of the critical left main stenosis, is a moderate-to-large size vessel supplying the lateral wall. Circumflex artery in the AV groove supplies three large OMs, all compromised by the severe left main disease. The OM2 has critical ostial stenosis. After OM3 the mid to distal circ is totally occluded with no collateral filling of that vessel. Right coronary artery has been previously stented and has severe proximal disease and diffuse mid vessel disease. There is a small posterior lateral branch and a moderate posterior descending branch. LIMA to the LAD is patent with good distal insertion and good antegrade and retrograde filling of the left anterior descending.     The patient's LIMA was done as a minimally invasive lateral thoracotomy, so there is no midline sternotomy. CONCLUSION:  Severe multivessel coronary artery disease, best served with consideration for redo CABG.         Waldo Joshi MD      [id number]  D:  04/16/2021 11:20  T:  04/17/2021 0:07  JOB #:  [id number]

## 2021-04-17 NOTE — PROGRESS NOTES
CV Progress Note    Admit Date: 4/16/2021    Patient preop for redo CABG    Subjective:     Patient present conditions: patient chest pain free this am      Objective:     Vitals:  Blood pressure (!) 113/58, pulse 91, temperature 99.2 °F (37.3 °C), resp. rate 28, height 6' (1.829 m), weight 99.8 kg (220 lb), SpO2 94 %. Vitals:    04/17/21 0700 04/17/21 0731 04/17/21 0800 04/17/21 0831   BP: 121/60 119/72 112/67 (!) 113/58   Pulse: 94 91 86 91   Resp: (!) 31 27 23 28   Temp: 99.2 °F (37.3 °C)      SpO2: 96% 93% 94%    Weight:       Height:            I/O:  No intake/output data recorded. 04/15 1901 - 04/17 0700  In: 578.8 [I.V.:578.8]  Out: 700 [Urine:700]  Last 3 Recorded Weights in this Encounter    04/16/21 1024   Weight: 99.8 kg (220 lb)       Intake/Output Summary (Last 24 hours) at 4/17/2021 0910  Last data filed at 4/17/2021 0600  Gross per 24 hour   Intake 578.77 ml   Output 700 ml   Net -121.23 ml           Heart: . NSR  Lung: clear  Neuro:   Incisions: old CABG incision stable  Chest Tubes:    ECG/Telemetry: NSR    Lab/Data Review:  Recent Results (from the past 12 hour(s))   TROPONIN-HIGH SENSITIVITY    Collection Time: 04/16/21 11:42 PM   Result Value Ref Range    Troponin-High Sensitivity 12,614.7 (HH) 0 - 14 pg/mL   CBC WITH AUTOMATED DIFF    Collection Time: 04/17/21  3:25 AM   Result Value Ref Range    WBC 9.5 4.3 - 11.1 K/uL    RBC 4.17 (L) 4.23 - 5.6 M/uL    HGB 12.2 (L) 13.6 - 17.2 g/dL    HCT 37.7 (L) 41.1 - 50.3 %    MCV 90.4 79.6 - 97.8 FL    MCH 29.3 26.1 - 32.9 PG    MCHC 32.4 31.4 - 35.0 g/dL    RDW 13.5 11.9 - 14.6 %    PLATELET 029 601 - 475 K/uL    MPV 8.5 (L) 9.4 - 12.3 FL    ABSOLUTE NRBC 0.00 0.0 - 0.2 K/uL    DF AUTOMATED      NEUTROPHILS 74 43 - 78 %    LYMPHOCYTES 18 13 - 44 %    MONOCYTES 8 4.0 - 12.0 %    EOSINOPHILS 0 (L) 0.5 - 7.8 %    BASOPHILS 0 0.0 - 2.0 %    IMMATURE GRANULOCYTES 0 0.0 - 5.0 %    ABS. NEUTROPHILS 7.0 1.7 - 8.2 K/UL    ABS.  LYMPHOCYTES 1.7 0.5 - 4.6 K/UL ABS. MONOCYTES 0.7 0.1 - 1.3 K/UL    ABS. EOSINOPHILS 0.0 0.0 - 0.8 K/UL    ABS. BASOPHILS 0.0 0.0 - 0.2 K/UL    ABS. IMM.  GRANS. 0.0 0.0 - 0.5 K/UL   METABOLIC PANEL, BASIC    Collection Time: 04/17/21  3:25 AM   Result Value Ref Range    Sodium 140 136 - 145 mmol/L    Potassium 4.0 3.5 - 5.1 mmol/L    Chloride 109 (H) 98 - 107 mmol/L    CO2 25 21 - 32 mmol/L    Anion gap 6 (L) 7 - 16 mmol/L    Glucose 109 (H) 65 - 100 mg/dL    BUN 12 6 - 23 MG/DL    Creatinine 0.72 (L) 0.8 - 1.5 MG/DL    GFR est AA >60 >60 ml/min/1.73m2    GFR est non-AA >60 >60 ml/min/1.73m2    Calcium 8.5 8.3 - 10.4 MG/DL   PROTHROMBIN TIME + INR    Collection Time: 04/17/21  3:25 AM   Result Value Ref Range    Prothrombin time 14.7 12.5 - 14.7 sec    INR 1.1     MAGNESIUM    Collection Time: 04/17/21  3:25 AM   Result Value Ref Range    Magnesium 2.2 1.8 - 2.4 mg/dL   HEMOGLOBIN A1C WITH EAG    Collection Time: 04/17/21  3:25 AM   Result Value Ref Range    Hemoglobin A1c 5.9 4.20 - 6.30 %    Est. average glucose 123 mg/dL   HEPARIN XA UFH    Collection Time: 04/17/21  3:25 AM   Result Value Ref Range    Heparin Xa UFH 0.14 (L) 0.3 - 0.7 IU/mL   LIPID PANEL    Collection Time: 04/17/21  3:25 AM   Result Value Ref Range    LIPID PROFILE          Cholesterol, total 131 <200 MG/DL    Triglyceride 156 (H) 35 - 150 MG/DL    HDL Cholesterol 29 (L) 40 - 60 MG/DL    LDL, calculated 70.8 <100 MG/DL    VLDL, calculated 31.2 (H) 6.0 - 23.0 MG/DL    CHOL/HDL Ratio 4.5       BMP:   Lab Results   Component Value Date/Time     04/17/2021 03:25 AM    K 4.0 04/17/2021 03:25 AM     (H) 04/17/2021 03:25 AM    CO2 25 04/17/2021 03:25 AM    AGAP 6 (L) 04/17/2021 03:25 AM     (H) 04/17/2021 03:25 AM    BUN 12 04/17/2021 03:25 AM    CREA 0.72 (L) 04/17/2021 03:25 AM    GFRAA >60 04/17/2021 03:25 AM    GFRNA >60 04/17/2021 03:25 AM     CMP:   Lab Results   Component Value Date/Time     04/17/2021 03:25 AM    K 4.0 04/17/2021 03:25 AM     (H) 04/17/2021 03:25 AM    CO2 25 04/17/2021 03:25 AM    AGAP 6 (L) 04/17/2021 03:25 AM     (H) 04/17/2021 03:25 AM    BUN 12 04/17/2021 03:25 AM    CREA 0.72 (L) 04/17/2021 03:25 AM    GFRAA >60 04/17/2021 03:25 AM    GFRNA >60 04/17/2021 03:25 AM    CA 8.5 04/17/2021 03:25 AM    MG 2.2 04/17/2021 03:25 AM    ALB 2.7 (L) 04/16/2021 08:35 PM    TP 6.9 04/16/2021 08:35 PM    GLOB 4.2 (H) 04/16/2021 08:35 PM    AGRAT 0.6 (L) 04/16/2021 08:35 PM    ALT 48 04/16/2021 08:35 PM     CBC:   Lab Results   Component Value Date/Time    WBC 9.5 04/17/2021 03:25 AM    HGB 12.2 (L) 04/17/2021 03:25 AM    HCT 37.7 (L) 04/17/2021 03:25 AM     04/17/2021 03:25 AM     All Cardiac Markers in the last 24 hours: No results found for: CPK, CK, CKMMB, CKMB, RCK3, CKMBT, CKNDX, CKND1, NIKOLAY, TROPT, TROIQ, JORGE LUIS, TROPT, TNIPOC, BNP, BNPP  Recent Glucose Results:   Lab Results   Component Value Date/Time     (H) 04/17/2021 03:25 AM     (H) 04/16/2021 08:35 PM     ABG: No results found for: PH, PHI, PCO2, PCO2I, PO2, PO2I, HCO3, HCO3I, FIO2, FIO2I  COAGS:   Lab Results   Component Value Date/Time    PTP 14.7 04/17/2021 03:25 AM    INR 1.1 04/17/2021 03:25 AM     Liver Panel:   Lab Results   Component Value Date/Time    ALB 2.7 (L) 04/16/2021 08:35 PM    TP 6.9 04/16/2021 08:35 PM    GLOB 4.2 (H) 04/16/2021 08:35 PM    AGRAT 0.6 (L) 04/16/2021 08:35 PM    ALT 48 04/16/2021 08:35 PM    AP 58 04/16/2021 08:35 PM        Radiology:     Assessment:           Plan/Recommendations/Medical Decision Making:     Continue present treatment    Plan for redo CABG Monday.  Risks complications and alternatives discussed,  Preop ordres are in    See orders    Edmund Lopez MD  4/17/2021

## 2021-04-17 NOTE — PROGRESS NOTES
Bedside shift report received from Leti Waller RN (offgoing nurse). Report given to Abiodun Franco RN. Vital signs stable. No complaints present. Patient in stable condition.

## 2021-04-17 NOTE — PROGRESS NOTES
[facility] CARDIOLOGY PROGRESS NOTE           4/17/2021 5:49 PM    Admit Date: 4/16/2021      Subjective:   No cp or sob      Objective:      Vitals:    04/17/21 1331 04/17/21 1400 04/17/21 1452 04/17/21 1500   BP: 118/75  120/69 113/67   Pulse: 83 97 81 82   Resp: 19 (!) 42 (!) 0 (!) 0   Temp:       SpO2: 96%  95% 95%   Weight:       Height:           Physical Exam:  General-No Acute Distress  Neck- supple, no JVD  CV- regular rate and rhythm no MRG  Lung- clear bilaterally  Abd- soft, nontender, nondistended  Ext- no edema bilaterally. Skin- warm and dry    Data Review:   Recent Labs     04/17/21  0325 04/16/21 2035    141   K 4.0 3.6   MG 2.2  --    BUN 12 13   CREA 0.72* 0.74*   * 154*   WBC 9.5 7.6   HGB 12.2* 12.1*   HCT 37.7* 37.4*    249   INR 1.1  --    CHOL 131  --    LDLC 70.8  --    HDL 29*  --        Assessment/Plan:     Principal Problem:    STEMI (ST elevation myocardial infarction) ([facility] (4/16/2021)        Active Problems:    CAD (coronary artery disease), native coronary artery (12/18/2009)          Ischemic cardiomyopathy (9/19/2016)          Hypertension (4/16/2021)      ////    Doing well. Stable.    Inc coreg 6.25          [address] Aniket MD  4/17/2021 5:49 PM

## 2021-04-18 ENCOUNTER — ANESTHESIA EVENT (OUTPATIENT)
Dept: SURGERY | Age: 56
DRG: 234 | End: 2021-04-18
Payer: MEDICARE

## 2021-04-18 LAB
APPEARANCE UR: CLEAR
APTT PPP: 89.8 SEC (ref 24.1–35.1)
BACTERIA SPEC CULT: NORMAL
BASOPHILS # BLD: 0 K/UL (ref 0–0.2)
BASOPHILS NFR BLD: 0 % (ref 0–2)
BILIRUB UR QL: NEGATIVE
COLOR UR: YELLOW
DIFFERENTIAL METHOD BLD: ABNORMAL
EOSINOPHIL # BLD: 0.1 K/UL (ref 0–0.8)
EOSINOPHIL NFR BLD: 1 % (ref 0.5–7.8)
ERYTHROCYTE [DISTWIDTH] IN BLOOD BY AUTOMATED COUNT: 13 % (ref 11.9–14.6)
GLUCOSE UR STRIP.AUTO-MCNC: NEGATIVE MG/DL
HCT VFR BLD AUTO: 37.3 % (ref 41.1–50.3)
HGB BLD-MCNC: 12.3 G/DL (ref 13.6–17.2)
HGB UR QL STRIP: NEGATIVE
HISTORY CHECKED?,CKHIST: NORMAL
IMM GRANULOCYTES # BLD AUTO: 0 K/UL (ref 0–0.5)
IMM GRANULOCYTES NFR BLD AUTO: 0 % (ref 0–5)
KETONES UR QL STRIP.AUTO: NEGATIVE MG/DL
LEUKOCYTE ESTERASE UR QL STRIP.AUTO: NEGATIVE
LYMPHOCYTES # BLD: 1.8 K/UL (ref 0.5–4.6)
LYMPHOCYTES NFR BLD: 25 % (ref 13–44)
MCH RBC QN AUTO: 29.1 PG (ref 26.1–32.9)
MCHC RBC AUTO-ENTMCNC: 33 G/DL (ref 31.4–35)
MCV RBC AUTO: 88.4 FL (ref 79.6–97.8)
MONOCYTES # BLD: 0.6 K/UL (ref 0.1–1.3)
MONOCYTES NFR BLD: 8 % (ref 4–12)
NEUTS SEG # BLD: 4.7 K/UL (ref 1.7–8.2)
NEUTS SEG NFR BLD: 65 % (ref 43–78)
NITRITE UR QL STRIP.AUTO: NEGATIVE
NRBC # BLD: 0 K/UL (ref 0–0.2)
PH UR STRIP: 6 [PH] (ref 5–9)
PLATELET # BLD AUTO: 263 K/UL (ref 150–450)
PMV BLD AUTO: 8.9 FL (ref 9.4–12.3)
PROT UR STRIP-MCNC: NEGATIVE MG/DL
RBC # BLD AUTO: 4.22 M/UL (ref 4.23–5.6)
SERVICE CMNT-IMP: NORMAL
SP GR UR REFRACTOMETRY: 1.02 (ref 1–1.02)
UFH PPP CHRO-ACNC: 0.21 IU/ML (ref 0.3–0.7)
UFH PPP CHRO-ACNC: 0.33 IU/ML (ref 0.3–0.7)
UFH PPP CHRO-ACNC: 0.35 IU/ML (ref 0.3–0.7)
UROBILINOGEN UR QL STRIP.AUTO: 2 EU/DL (ref 0.2–1)
WBC # BLD AUTO: 7.2 K/UL (ref 4.3–11.1)

## 2021-04-18 PROCEDURE — 85730 THROMBOPLASTIN TIME PARTIAL: CPT

## 2021-04-18 PROCEDURE — 74011250637 HC RX REV CODE- 250/637: Performed by: PHYSICIAN ASSISTANT

## 2021-04-18 PROCEDURE — 2709999900 HC NON-CHARGEABLE SUPPLY

## 2021-04-18 PROCEDURE — 74011250637 HC RX REV CODE- 250/637: Performed by: THORACIC SURGERY (CARDIOTHORACIC VASCULAR SURGERY)

## 2021-04-18 PROCEDURE — 81003 URINALYSIS AUTO W/O SCOPE: CPT

## 2021-04-18 PROCEDURE — 85520 HEPARIN ASSAY: CPT

## 2021-04-18 PROCEDURE — 74011250636 HC RX REV CODE- 250/636: Performed by: NURSE PRACTITIONER

## 2021-04-18 PROCEDURE — 87641 MR-STAPH DNA AMP PROBE: CPT

## 2021-04-18 PROCEDURE — 86901 BLOOD TYPING SEROLOGIC RH(D): CPT

## 2021-04-18 PROCEDURE — 86923 COMPATIBILITY TEST ELECTRIC: CPT

## 2021-04-18 PROCEDURE — 74011250636 HC RX REV CODE- 250/636: Performed by: INTERNAL MEDICINE

## 2021-04-18 PROCEDURE — 85025 COMPLETE CBC W/AUTO DIFF WBC: CPT

## 2021-04-18 PROCEDURE — 65610000006 HC RM INTENSIVE CARE

## 2021-04-18 PROCEDURE — 74011250637 HC RX REV CODE- 250/637: Performed by: INTERNAL MEDICINE

## 2021-04-18 PROCEDURE — 99231 SBSQ HOSP IP/OBS SF/LOW 25: CPT | Performed by: INTERNAL MEDICINE

## 2021-04-18 RX ORDER — GLYCERIN ADULT
1 SUPPOSITORY, RECTAL RECTAL
Status: COMPLETED | OUTPATIENT
Start: 2021-04-18 | End: 2021-04-18

## 2021-04-18 RX ORDER — HEPARIN SODIUM 5000 [USP'U]/ML
20 INJECTION, SOLUTION INTRAVENOUS; SUBCUTANEOUS ONCE
Status: COMPLETED | OUTPATIENT
Start: 2021-04-18 | End: 2021-04-18

## 2021-04-18 RX ADMIN — FAMOTIDINE 20 MG: 20 TABLET, FILM COATED ORAL at 21:10

## 2021-04-18 RX ADMIN — CARVEDILOL 6.25 MG: 6.25 TABLET, FILM COATED ORAL at 21:10

## 2021-04-18 RX ADMIN — ASPIRIN 81 MG: 81 TABLET ORAL at 08:16

## 2021-04-18 RX ADMIN — ATORVASTATIN CALCIUM 80 MG: 80 TABLET, FILM COATED ORAL at 08:16

## 2021-04-18 RX ADMIN — HEPARIN SODIUM 22 UNITS/KG/HR: 5000 INJECTION, SOLUTION INTRAVENOUS at 08:17

## 2021-04-18 RX ADMIN — FAMOTIDINE 20 MG: 20 TABLET, FILM COATED ORAL at 08:16

## 2021-04-18 RX ADMIN — GLYCERIN 1 SUPPOSITORY: 2 SUPPOSITORY RECTAL at 20:32

## 2021-04-18 RX ADMIN — HEPARIN SODIUM 2000 UNITS: 5000 INJECTION INTRAVENOUS; SUBCUTANEOUS at 05:26

## 2021-04-18 RX ADMIN — HYDROCODONE BITARTRATE AND ACETAMINOPHEN 1 TABLET: 7.5; 325 TABLET ORAL at 10:20

## 2021-04-18 RX ADMIN — HEPARIN SODIUM 22 UNITS/KG/HR: 5000 INJECTION, SOLUTION INTRAVENOUS at 20:33

## 2021-04-18 RX ADMIN — HYDROCODONE BITARTRATE AND ACETAMINOPHEN 1 TABLET: 7.5; 325 TABLET ORAL at 21:10

## 2021-04-18 RX ADMIN — LOSARTAN POTASSIUM 25 MG: 25 TABLET, FILM COATED ORAL at 08:16

## 2021-04-18 RX ADMIN — CARVEDILOL 6.25 MG: 6.25 TABLET, FILM COATED ORAL at 08:16

## 2021-04-18 RX ADMIN — AMIODARONE HYDROCHLORIDE 600 MG: 200 TABLET ORAL at 15:18

## 2021-04-18 RX ADMIN — ACETAMINOPHEN 650 MG: 325 TABLET ORAL at 08:16

## 2021-04-18 NOTE — PROGRESS NOTES
[facility] CARDIOLOGY PROGRESS NOTE           4/18/2021 10:59 AM    Admit Date: 4/16/2021      Subjective:   No cp or sob    ROS:  Cardiovascular:  As noted above    Objective:      Vitals:    04/18/21 0800 04/18/21 0900 04/18/21 0931 04/18/21 1000   BP: 119/76 122/76 111/67 (!) 95/58   Pulse: 93 89 87 82   Resp:       Temp:       SpO2: 97% 97% 96% 97%   Weight:       Height:           Physical Exam:  General-No Acute Distress  Neck- supple, no JVD  CV- regular rate and rhythm no MRG  Lung- clear bilaterally  Abd- soft, nontender, nondistended  Ext- no edema bilaterally. Skin- warm and dry    Data Review:   Recent Labs     04/18/21  0330 04/17/21 0325 04/16/21 2035   NA  --  140 141   K  --  4.0 3.6   MG  --  2.2  --    BUN  --  12 13   CREA  --  0.72* 0.74*   GLU  --  109* 154*   WBC 7.2 9.5 7.6   HGB 12.3* 12.2* 12.1*   HCT 37.3* 37.7* 37.4*    250 249   INR  --  1.1  --    CHOL  --  131  --    LDLC  --  70.8  --    HDL  --  29*  --        Assessment/Plan:     Principal Problem:    STEMI (ST elevation myocardial infarction) ([facility] (4/16/2021)        Active Problems:    CAD (coronary artery disease), native coronary artery (12/18/2009)          Ischemic cardiomyopathy (9/19/2016)          Hypertension (4/16/2021)      ////    For Cabg.   Cont Coreg 6.25          Rhiannon Doshi MD  4/18/2021 10:59 AM

## 2021-04-18 NOTE — ANESTHESIA PREPROCEDURE EVALUATION
Relevant Problems   CARDIOVASCULAR   (+) CAD (coronary artery disease), native coronary artery   (+) Hypertension   (+) Myocardial infarction, anterior wall/, subsequent (HCC)   (+) S/P CABG x 1   (+) STEMI (ST elevation myocardial infarction) (HCC)      ENDOCRINE   (+) Severe obesity (HCC)       Anesthetic History   No history of anesthetic complications            Review of Systems / Medical History  Patient summary reviewed, nursing notes reviewed and pertinent labs reviewed    Pulmonary          Shortness of breath         Neuro/Psych   Within defined limits           Cardiovascular    Hypertension  Valvular problems/murmurs (mod-severe per TTE april '21): mitral insufficiency  Angina: with exertion      Past MI (admitted with STEMI on NTG and heparin drips), CAD, cardiac stents, CABG (min inv, off pump LIMA to LAD 1995) and hyperlipidemia    Exercise tolerance: <4 METS  Comments: TTE- EF 20-25%, inferolateral Hypokinesis, mod-sever MR    Cath- severe multivessel dz with 95% distal left main, EF 35%   GI/Hepatic/Renal     GERD           Endo/Other        Obesity     Other Findings            Physical Exam    Airway  Mallampati: III    Neck ROM: normal range of motion   Mouth opening: Normal     Cardiovascular  Regular rate and rhythm,  S1 and S2 normal,  no murmur, click, rub, or gallop             Dental  No notable dental hx       Pulmonary  Breath sounds clear to auscultation               Abdominal         Other Findings            Anesthetic Plan    ASA: 4  Anesthesia type: general    Monitoring Plan: [place], CVP, Arterial line, BIS and [place]    Post procedure ventilation   Induction: Intravenous  Anesthetic plan and risks discussed with: Patient and Family

## 2021-04-18 NOTE — PROGRESS NOTES
CV Progress Note    Admit Date: 4/16/2021     Preop for redo CABG on NTG Heparin    Subjective:   Chest pain free. Less anxious today  Patient present conditions:     stable    Objective:     Vitals:  Blood pressure 119/76, pulse 93, temperature 98.1 °F (36.7 °C), resp. rate 18, height 6' (1.829 m), weight 108.5 kg (239 lb 3.2 oz), SpO2 97 %. Vitals:    04/18/21 0631 04/18/21 0700 04/18/21 0731 04/18/21 0800   BP: 98/61 91/65 122/78 119/76   Pulse: 77 80 82 93   Resp:       Temp:  98.1 °F (36.7 °C)     SpO2: 95% 95% 97% 97%   Weight:       Height:            I/O:  04/18 0701 - 04/18 1900  In: 240 [P.O.:240]  Out: 500 [Urine:500]  04/16 1901 - 04/18 0700  In: 1689.1 [P.O.:300; I.V.:1389.1]  Out: 3050 [Urine:3050]  Last 3 Recorded Weights in this Encounter    04/16/21 1024 04/18/21 0602   Weight: 99.8 kg (220 lb) 108.5 kg (239 lb 3.2 oz)       Intake/Output Summary (Last 24 hours) at 4/18/2021 0847  Last data filed at 4/18/2021 0826  Gross per 24 hour   Intake 1583.11 ml   Output 2850 ml   Net -1266.89 ml           Heart: .  Lung:   Neuro:   Incisions:   Chest Tubes:    ECG/Telemetry:     Lab/Data Review:  Recent Results (from the past 12 hour(s))   CBC WITH AUTOMATED DIFF    Collection Time: 04/18/21  3:30 AM   Result Value Ref Range    WBC 7.2 4.3 - 11.1 K/uL    RBC 4.22 (L) 4.23 - 5.6 M/uL    HGB 12.3 (L) 13.6 - 17.2 g/dL    HCT 37.3 (L) 41.1 - 50.3 %    MCV 88.4 79.6 - 97.8 FL    MCH 29.1 26.1 - 32.9 PG    MCHC 33.0 31.4 - 35.0 g/dL    RDW 13.0 11.9 - 14.6 %    PLATELET 927 310 - 570 K/uL    MPV 8.9 (L) 9.4 - 12.3 FL    ABSOLUTE NRBC 0.00 0.0 - 0.2 K/uL    DF AUTOMATED      NEUTROPHILS 65 43 - 78 %    LYMPHOCYTES 25 13 - 44 %    MONOCYTES 8 4.0 - 12.0 %    EOSINOPHILS 1 0.5 - 7.8 %    BASOPHILS 0 0.0 - 2.0 %    IMMATURE GRANULOCYTES 0 0.0 - 5.0 %    ABS. NEUTROPHILS 4.7 1.7 - 8.2 K/UL    ABS. LYMPHOCYTES 1.8 0.5 - 4.6 K/UL    ABS. MONOCYTES 0.6 0.1 - 1.3 K/UL    ABS. EOSINOPHILS 0.1 0.0 - 0.8 K/UL    ABS. BASOPHILS 0.0 0.0 - 0.2 K/UL    ABS. IMM. GRANS. 0.0 0.0 - 0.5 K/UL   HEPARIN XA UFH    Collection Time: 04/18/21  3:30 AM   Result Value Ref Range    Heparin Xa UFH 0.21 (L) 0.3 - 0.7 IU/mL   PTT    Collection Time: 04/18/21  3:30 AM   Result Value Ref Range    aPTT 89.8 (H) 24.1 - 35.1 SEC   TYPE & SCREEN    Collection Time: 04/18/21  3:31 AM   Result Value Ref Range    Crossmatch Expiration 04/21/2021,2359     ABO/Rh(D) A POSITIVE     Antibody screen NEG    RBC, ALLOCATE    Collection Time: 04/18/21  5:00 AM   Result Value Ref Range    HISTORY CHECKED?  Historical check performed      BMP: No results found for: NA, K, CL, CO2, AGAP, GLU, BUN, CREA, GFRAA, GFRNA  CMP: No results found for: NA, K, CL, CO2, AGAP, GLU, BUN, CREA, GFRAA, GFRNA, CA, MG, PHOS, ALB, TBIL, TP, ALB, GLOB, AGRAT, ALT  CBC:   Lab Results   Component Value Date/Time    WBC 7.2 04/18/2021 03:30 AM    HGB 12.3 (L) 04/18/2021 03:30 AM    HCT 37.3 (L) 04/18/2021 03:30 AM     04/18/2021 03:30 AM     All Cardiac Markers in the last 24 hours: No results found for: CPK, CK, CKMMB, CKMB, RCK3, CKMBT, CKNDX, CKND1, NIKOLAY, TROPT, TROIQ, JORGE LUIS, TROPT, TNIPOC, BNP, BNPP  Recent Glucose Results: No results found for: GLU  ABG: No results found for: PH, PHI, PCO2, PCO2I, PO2, PO2I, HCO3, HCO3I, FIO2, FIO2I  COAGS:   Lab Results   Component Value Date/Time    APTT 89.8 (H) 04/18/2021 03:30 AM     Liver Panel: No results found for: ALB, CBIL, TBIL, TP, GLOB, AGRAT, ASTPOC, ALTPOC, ALT, AP     Radiology:     Assessment:     preop for redo CABG on NTG and Heparin   Patient stable overnight      Plan/Recommendations/Medical Decision Making:     Continue present treatment        See orders    Steffany Goodwin MD  4/18/2021

## 2021-04-18 NOTE — PROGRESS NOTES
Bedside shift report given to Paulo Brewer (oncoming nurse) by Ktaie Gavin RN (offgoing nurse). Bedside shift report included the following information: SBAR, Kardex, Procedure Summary, MAR, and Recent Results. 105

## 2021-04-19 ENCOUNTER — ANESTHESIA (OUTPATIENT)
Dept: SURGERY | Age: 56
DRG: 234 | End: 2021-04-19
Payer: MEDICARE

## 2021-04-19 ENCOUNTER — APPOINTMENT (OUTPATIENT)
Dept: GENERAL RADIOLOGY | Age: 56
DRG: 234 | End: 2021-04-19
Attending: PHYSICIAN ASSISTANT
Payer: MEDICARE

## 2021-04-19 PROBLEM — Z99.11 ENCOUNTER FOR WEANING FROM VENTILATOR (HCC): Status: ACTIVE | Noted: 2021-04-19

## 2021-04-19 PROBLEM — Z95.1 S/P CABG X 3: Status: ACTIVE | Noted: 2021-04-19

## 2021-04-19 LAB
ANION GAP SERPL CALC-SCNC: 5 MMOL/L (ref 7–16)
APTT PPP: 32 SEC (ref 24.1–35.1)
ARTERIAL PATENCY WRIST A: ABNORMAL
ATRIAL RATE: 70 BPM
BASE DEFICIT BLD-SCNC: 0.1 MMOL/L
BASE DEFICIT BLD-SCNC: 0.5 MMOL/L
BASE DEFICIT BLD-SCNC: 0.9 MMOL/L
BASE DEFICIT BLD-SCNC: 0.9 MMOL/L
BASE DEFICIT BLD-SCNC: 1.9 MMOL/L
BASE DEFICIT BLD-SCNC: 2.3 MMOL/L
BASE DEFICIT BLD-SCNC: 2.9 MMOL/L
BDY SITE: ABNORMAL
BUN SERPL-MCNC: 12 MG/DL (ref 6–23)
CA-I BLD-MCNC: 1.09 MMOL/L (ref 1.12–1.32)
CA-I BLD-MCNC: 1.1 MMOL/L (ref 1.12–1.32)
CA-I BLD-MCNC: 1.11 MMOL/L (ref 1.12–1.32)
CA-I BLD-MCNC: 1.14 MMOL/L (ref 1.12–1.32)
CA-I BLD-MCNC: 1.2 MMOL/L (ref 1.12–1.32)
CA-I BLD-MCNC: 1.25 MMOL/L (ref 1.12–1.32)
CA-I BLD-MCNC: 1.29 MMOL/L (ref 1.12–1.32)
CALCIUM SERPL-MCNC: 8.7 MG/DL (ref 8.3–10.4)
CALCULATED P AXIS, ECG09: 39 DEGREES
CALCULATED R AXIS, ECG10: -44 DEGREES
CALCULATED T AXIS, ECG11: 57 DEGREES
CHLORIDE SERPL-SCNC: 112 MMOL/L (ref 98–107)
CO2 BLD-SCNC: 23 MMOL/L (ref 13–23)
CO2 BLD-SCNC: 23 MMOL/L (ref 13–23)
CO2 BLD-SCNC: 24 MMOL/L (ref 13–23)
CO2 BLD-SCNC: 25 MMOL/L (ref 13–23)
CO2 BLD-SCNC: 26 MMOL/L (ref 13–23)
CO2 SERPL-SCNC: 24 MMOL/L (ref 21–32)
CREAT SERPL-MCNC: 0.76 MG/DL (ref 0.8–1.5)
DIAGNOSIS, 93000: NORMAL
ERYTHROCYTE [DISTWIDTH] IN BLOOD BY AUTOMATED COUNT: 13 % (ref 11.9–14.6)
FIBRINOGEN PPP-MCNC: 632 MG/DL (ref 190–501)
FIO2 ON VENT: 75 %
GAS FLOW.O2 O2 DELIVERY SYS: ABNORMAL L/MIN
GLUCOSE BLD STRIP.AUTO-MCNC: 101 MG/DL (ref 65–100)
GLUCOSE BLD STRIP.AUTO-MCNC: 105 MG/DL (ref 65–100)
GLUCOSE BLD STRIP.AUTO-MCNC: 108 MG/DL (ref 65–100)
GLUCOSE BLD STRIP.AUTO-MCNC: 112 MG/DL (ref 65–100)
GLUCOSE BLD STRIP.AUTO-MCNC: 113 MG/DL (ref 65–100)
GLUCOSE BLD STRIP.AUTO-MCNC: 115 MG/DL (ref 65–100)
GLUCOSE BLD STRIP.AUTO-MCNC: 116 MG/DL (ref 65–100)
GLUCOSE BLD STRIP.AUTO-MCNC: 117 MG/DL (ref 65–100)
GLUCOSE BLD STRIP.AUTO-MCNC: 122 MG/DL (ref 65–100)
GLUCOSE BLD STRIP.AUTO-MCNC: 123 MG/DL (ref 65–100)
GLUCOSE BLD STRIP.AUTO-MCNC: 126 MG/DL (ref 65–100)
GLUCOSE BLD STRIP.AUTO-MCNC: 131 MG/DL (ref 65–100)
GLUCOSE BLD STRIP.AUTO-MCNC: 133 MG/DL (ref 65–100)
GLUCOSE SERPL-MCNC: 129 MG/DL (ref 65–100)
HCO3 BLD-SCNC: 22.8 MMOL/L (ref 22–26)
HCO3 BLD-SCNC: 23.1 MMOL/L (ref 22–26)
HCO3 BLD-SCNC: 23.7 MMOL/L (ref 22–26)
HCO3 BLD-SCNC: 24.6 MMOL/L (ref 22–26)
HCO3 BLD-SCNC: 24.8 MMOL/L (ref 22–26)
HCO3 BLD-SCNC: 24.8 MMOL/L (ref 22–26)
HCO3 BLD-SCNC: 25.7 MMOL/L (ref 22–26)
HCT VFR BLD AUTO: 31.7 % (ref 41.1–50.3)
HCT VFR BLD AUTO: 33.8 % (ref 41.1–50.3)
HCT VFR BLD AUTO: 34.4 % (ref 41.1–50.3)
HGB BLD-MCNC: 10.5 G/DL (ref 13.6–17.2)
HGB BLD-MCNC: 11 G/DL (ref 13.6–17.2)
HGB BLD-MCNC: 11 G/DL (ref 13.6–17.2)
HISTORY CHECKED?,CKHIST: NORMAL
INR PPP: 1.3
MAGNESIUM SERPL-MCNC: 2.6 MG/DL (ref 1.8–2.4)
MAGNESIUM SERPL-MCNC: 2.9 MG/DL (ref 1.8–2.4)
MAGNESIUM SERPL-MCNC: 3.3 MG/DL (ref 1.8–2.4)
MCH RBC QN AUTO: 29.2 PG (ref 26.1–32.9)
MCHC RBC AUTO-ENTMCNC: 32 G/DL (ref 31.4–35)
MCV RBC AUTO: 91.2 FL (ref 79.6–97.8)
NRBC # BLD: 0 K/UL (ref 0–0.2)
P-R INTERVAL, ECG05: 160 MS
PCO2 BLD: 41.2 MMHG (ref 35–45)
PCO2 BLD: 41.5 MMHG (ref 35–45)
PCO2 BLD: 42.2 MMHG (ref 35–45)
PCO2 BLD: 43.1 MMHG (ref 35–45)
PCO2 BLD: 44.4 MMHG (ref 35–45)
PCO2 BLD: 44.7 MMHG (ref 35–45)
PCO2 BLD: 45 MMHG (ref 35–45)
PEEP RESPIRATORY: 8 CM[H2O]
PH BLD: 7.34 [PH] (ref 7.35–7.45)
PH BLD: 7.35 [PH] (ref 7.35–7.45)
PH BLD: 7.36 [PH] (ref 7.35–7.45)
PH BLD: 7.36 [PH] (ref 7.35–7.45)
PH BLD: 7.38 [PH] (ref 7.35–7.45)
PLATELET # BLD AUTO: 195 K/UL (ref 150–450)
PMV BLD AUTO: 8.6 FL (ref 9.4–12.3)
PO2 BLD: 168 MMHG (ref 75–100)
PO2 BLD: 238 MMHG (ref 75–100)
PO2 BLD: 247 MMHG (ref 75–100)
PO2 BLD: 251 MMHG (ref 75–100)
PO2 BLD: 253 MMHG (ref 75–100)
PO2 BLD: 290 MMHG (ref 75–100)
PO2 BLD: 82 MMHG (ref 75–100)
POTASSIUM BLD-SCNC: 4.1 MMOL/L (ref 3.5–5.1)
POTASSIUM BLD-SCNC: 4.5 MMOL/L (ref 3.5–5.1)
POTASSIUM BLD-SCNC: 4.5 MMOL/L (ref 3.5–5.1)
POTASSIUM BLD-SCNC: 4.6 MMOL/L (ref 3.5–5.1)
POTASSIUM BLD-SCNC: 4.7 MMOL/L (ref 3.5–5.1)
POTASSIUM BLD-SCNC: 5.4 MMOL/L (ref 3.5–5.1)
POTASSIUM BLD-SCNC: 5.4 MMOL/L (ref 3.5–5.1)
POTASSIUM SERPL-SCNC: 4.4 MMOL/L (ref 3.5–5.1)
PRESSURE SUPPORT SETTING VENT: 10 CM[H2O]
PROTHROMBIN TIME: 16.6 SEC (ref 12.5–14.7)
Q-T INTERVAL, ECG07: 454 MS
QRS DURATION, ECG06: 130 MS
QTC CALCULATION (BEZET), ECG08: 490 MS
RBC # BLD AUTO: 3.77 M/UL (ref 4.23–5.6)
RESPIRATORY RATE: 16 (ref 5–40)
SAO2 % BLD: 100 %
SAO2 % BLD: 95 %
SAO2 % BLD: 99 %
SERVICE CMNT-IMP: ABNORMAL
SODIUM BLD-SCNC: 136 MMOL/L (ref 136–145)
SODIUM BLD-SCNC: 136 MMOL/L (ref 136–145)
SODIUM BLD-SCNC: 137 MMOL/L (ref 136–145)
SODIUM BLD-SCNC: 138 MMOL/L (ref 136–145)
SODIUM BLD-SCNC: 138 MMOL/L (ref 136–145)
SODIUM BLD-SCNC: 139 MMOL/L (ref 136–145)
SODIUM BLD-SCNC: 140 MMOL/L (ref 136–145)
SODIUM SERPL-SCNC: 141 MMOL/L (ref 138–145)
SPECIMEN SITE: ABNORMAL
TOTAL RESP. RATE, ITRR: 16
VENTILATION MODE VENT: ABNORMAL
VENTRICULAR RATE, ECG03: 70 BPM
VT SETTING VENT: 500 ML
WBC # BLD AUTO: 12 K/UL (ref 4.3–11.1)

## 2021-04-19 PROCEDURE — 74011000250 HC RX REV CODE- 250: Performed by: PHYSICIAN ASSISTANT

## 2021-04-19 PROCEDURE — 74011000250 HC RX REV CODE- 250

## 2021-04-19 PROCEDURE — 2709999900 HC NON-CHARGEABLE SUPPLY

## 2021-04-19 PROCEDURE — 77030020751 HC FLTR TBNG TRNSFUS HAEM -A: Performed by: NURSE ANESTHETIST, CERTIFIED REGISTERED

## 2021-04-19 PROCEDURE — 77030018571 HC SUT PROL1 J&J -B: Performed by: THORACIC SURGERY (CARDIOTHORACIC VASCULAR SURGERY)

## 2021-04-19 PROCEDURE — 82803 BLOOD GASES ANY COMBINATION: CPT

## 2021-04-19 PROCEDURE — 77030027138 HC INCENT SPIROMETER -A

## 2021-04-19 PROCEDURE — 74011250636 HC RX REV CODE- 250/636: Performed by: PHYSICIAN ASSISTANT

## 2021-04-19 PROCEDURE — 77030039425 HC BLD LARYNG TRULITE DISP TELE -A: Performed by: NURSE ANESTHETIST, CERTIFIED REGISTERED

## 2021-04-19 PROCEDURE — 84295 ASSAY OF SERUM SODIUM: CPT

## 2021-04-19 PROCEDURE — P9045 ALBUMIN (HUMAN), 5%, 250 ML: HCPCS | Performed by: NURSE ANESTHETIST, CERTIFIED REGISTERED

## 2021-04-19 PROCEDURE — 74011250637 HC RX REV CODE- 250/637: Performed by: PHYSICIAN ASSISTANT

## 2021-04-19 PROCEDURE — 77030010813: Performed by: THORACIC SURGERY (CARDIOTHORACIC VASCULAR SURGERY)

## 2021-04-19 PROCEDURE — 77030020751 HC FLTR TBNG TRNSFUS HAEM -A: Performed by: THORACIC SURGERY (CARDIOTHORACIC VASCULAR SURGERY)

## 2021-04-19 PROCEDURE — 77030022953: Performed by: THORACIC SURGERY (CARDIOTHORACIC VASCULAR SURGERY)

## 2021-04-19 PROCEDURE — 77030016564 HC BLD STRNL SAW4 CNMD -B: Performed by: THORACIC SURGERY (CARDIOTHORACIC VASCULAR SURGERY)

## 2021-04-19 PROCEDURE — 77030002970 HC SUT PLEDG TELE -A: Performed by: THORACIC SURGERY (CARDIOTHORACIC VASCULAR SURGERY)

## 2021-04-19 PROCEDURE — 77030013794 HC KT TRNSDUC BLD EDWD -B: Performed by: NURSE ANESTHETIST, CERTIFIED REGISTERED

## 2021-04-19 PROCEDURE — 5A1221Z PERFORMANCE OF CARDIAC OUTPUT, CONTINUOUS: ICD-10-PCS | Performed by: THORACIC SURGERY (CARDIOTHORACIC VASCULAR SURGERY)

## 2021-04-19 PROCEDURE — 82962 GLUCOSE BLOOD TEST: CPT

## 2021-04-19 PROCEDURE — 77030002996 HC SUT SLK J&J -A: Performed by: THORACIC SURGERY (CARDIOTHORACIC VASCULAR SURGERY)

## 2021-04-19 PROCEDURE — C1729 CATH, DRAINAGE: HCPCS | Performed by: THORACIC SURGERY (CARDIOTHORACIC VASCULAR SURGERY)

## 2021-04-19 PROCEDURE — 77030013861 HC PNCH AORT CLNCUT QUES -B: Performed by: THORACIC SURGERY (CARDIOTHORACIC VASCULAR SURGERY)

## 2021-04-19 PROCEDURE — 77030012390 HC DRN CHST BTL GTNG -B: Performed by: THORACIC SURGERY (CARDIOTHORACIC VASCULAR SURGERY)

## 2021-04-19 PROCEDURE — 74011250636 HC RX REV CODE- 250/636

## 2021-04-19 PROCEDURE — 77030002520 HC INSRT CLMP LATIS STLTH AMR -B: Performed by: THORACIC SURGERY (CARDIOTHORACIC VASCULAR SURGERY)

## 2021-04-19 PROCEDURE — 74011250637 HC RX REV CODE- 250/637: Performed by: THORACIC SURGERY (CARDIOTHORACIC VASCULAR SURGERY)

## 2021-04-19 PROCEDURE — 2709999900 HC NON-CHARGEABLE SUPPLY: Performed by: THORACIC SURGERY (CARDIOTHORACIC VASCULAR SURGERY)

## 2021-04-19 PROCEDURE — 77030025827 HC BG BLD DNR AUTLG MEDT -A: Performed by: THORACIC SURGERY (CARDIOTHORACIC VASCULAR SURGERY)

## 2021-04-19 PROCEDURE — 77030013797 HC KT TRNSDUC PRSSR EDWD -A: Performed by: THORACIC SURGERY (CARDIOTHORACIC VASCULAR SURGERY)

## 2021-04-19 PROCEDURE — 77030006824 HC BLD SAW SAG CNMD -B: Performed by: THORACIC SURGERY (CARDIOTHORACIC VASCULAR SURGERY)

## 2021-04-19 PROCEDURE — 80048 BASIC METABOLIC PNL TOTAL CA: CPT

## 2021-04-19 PROCEDURE — 65610000006 HC RM INTENSIVE CARE

## 2021-04-19 PROCEDURE — 93005 ELECTROCARDIOGRAM TRACING: CPT | Performed by: PHYSICIAN ASSISTANT

## 2021-04-19 PROCEDURE — 77030018548 HC SUT ETHBND2 J&J -B: Performed by: THORACIC SURGERY (CARDIOTHORACIC VASCULAR SURGERY)

## 2021-04-19 PROCEDURE — 77030003010 HC SUT SURG STL J&J -B: Performed by: THORACIC SURGERY (CARDIOTHORACIC VASCULAR SURGERY)

## 2021-04-19 PROCEDURE — 77030040922 HC BLNKT HYPOTHRM STRY -A: Performed by: NURSE ANESTHETIST, CERTIFIED REGISTERED

## 2021-04-19 PROCEDURE — 83735 ASSAY OF MAGNESIUM: CPT

## 2021-04-19 PROCEDURE — 74011000250 HC RX REV CODE- 250: Performed by: INTERNAL MEDICINE

## 2021-04-19 PROCEDURE — 76010000203 HC CV SURG 5.5 TO 6 HR INTENSV-TIER 1: Performed by: THORACIC SURGERY (CARDIOTHORACIC VASCULAR SURGERY)

## 2021-04-19 PROCEDURE — 94664 DEMO&/EVAL PT USE INHALER: CPT

## 2021-04-19 PROCEDURE — 77030034888 HC SUT PROL 2 J&J -B: Performed by: THORACIC SURGERY (CARDIOTHORACIC VASCULAR SURGERY)

## 2021-04-19 PROCEDURE — 85027 COMPLETE CBC AUTOMATED: CPT

## 2021-04-19 PROCEDURE — 77030030163 HC BN WAX J&J -A: Performed by: THORACIC SURGERY (CARDIOTHORACIC VASCULAR SURGERY)

## 2021-04-19 PROCEDURE — 94002 VENT MGMT INPAT INIT DAY: CPT

## 2021-04-19 PROCEDURE — 77030037088 HC TUBE ENDOTRACH ORAL NSL COVD-A: Performed by: NURSE ANESTHETIST, CERTIFIED REGISTERED

## 2021-04-19 PROCEDURE — 86580 TB INTRADERMAL TEST: CPT | Performed by: PHYSICIAN ASSISTANT

## 2021-04-19 PROCEDURE — 84132 ASSAY OF SERUM POTASSIUM: CPT

## 2021-04-19 PROCEDURE — 021209W BYPASS CORONARY ARTERY, THREE ARTERIES FROM AORTA WITH AUTOLOGOUS VENOUS TISSUE, OPEN APPROACH: ICD-10-PCS | Performed by: THORACIC SURGERY (CARDIOTHORACIC VASCULAR SURGERY)

## 2021-04-19 PROCEDURE — 74011250636 HC RX REV CODE- 250/636: Performed by: THORACIC SURGERY (CARDIOTHORACIC VASCULAR SURGERY)

## 2021-04-19 PROCEDURE — 77030031139 HC SUT VCRL2 J&J -A: Performed by: THORACIC SURGERY (CARDIOTHORACIC VASCULAR SURGERY)

## 2021-04-19 PROCEDURE — 77030016376 HC TBNG MON PRSS ARMD -A: Performed by: THORACIC SURGERY (CARDIOTHORACIC VASCULAR SURGERY)

## 2021-04-19 PROCEDURE — 77030006690 HC BLD OPHTH BVR BD -B: Performed by: THORACIC SURGERY (CARDIOTHORACIC VASCULAR SURGERY)

## 2021-04-19 PROCEDURE — 74011000258 HC RX REV CODE- 258: Performed by: NURSE ANESTHETIST, CERTIFIED REGISTERED

## 2021-04-19 PROCEDURE — 85384 FIBRINOGEN ACTIVITY: CPT

## 2021-04-19 PROCEDURE — 76060000042 HC ANESTHESIA 5.5 TO 6 HR: Performed by: THORACIC SURGERY (CARDIOTHORACIC VASCULAR SURGERY)

## 2021-04-19 PROCEDURE — 77030019908 HC STETH ESOPH SIMS -A: Performed by: NURSE ANESTHETIST, CERTIFIED REGISTERED

## 2021-04-19 PROCEDURE — 85018 HEMOGLOBIN: CPT

## 2021-04-19 PROCEDURE — 77030005537 HC CATH URETH BARD -A: Performed by: THORACIC SURGERY (CARDIOTHORACIC VASCULAR SURGERY)

## 2021-04-19 PROCEDURE — 71045 X-RAY EXAM CHEST 1 VIEW: CPT

## 2021-04-19 PROCEDURE — 77030018673: Performed by: THORACIC SURGERY (CARDIOTHORACIC VASCULAR SURGERY)

## 2021-04-19 PROCEDURE — C1769 GUIDE WIRE: HCPCS | Performed by: THORACIC SURGERY (CARDIOTHORACIC VASCULAR SURGERY)

## 2021-04-19 PROCEDURE — 77030008771 HC TU NG SALEM SUMP -A: Performed by: NURSE ANESTHETIST, CERTIFIED REGISTERED

## 2021-04-19 PROCEDURE — 77030005518 HC CATH URETH FOL 2W BARD -B: Performed by: THORACIC SURGERY (CARDIOTHORACIC VASCULAR SURGERY)

## 2021-04-19 PROCEDURE — 77030020407 HC IV BLD WRMR ST 3M -A: Performed by: NURSE ANESTHETIST, CERTIFIED REGISTERED

## 2021-04-19 PROCEDURE — 77030008477 HC STYL SATN SLP COVD -A: Performed by: NURSE ANESTHETIST, CERTIFIED REGISTERED

## 2021-04-19 PROCEDURE — 77030018729 HC ELECTRD DEFIB PAD CARD -B: Performed by: THORACIC SURGERY (CARDIOTHORACIC VASCULAR SURGERY)

## 2021-04-19 PROCEDURE — 82330 ASSAY OF CALCIUM: CPT

## 2021-04-19 PROCEDURE — P9047 ALBUMIN (HUMAN), 25%, 50ML: HCPCS

## 2021-04-19 PROCEDURE — 77030025646 HC AUTOTRNSFUS KT TERU -C: Performed by: THORACIC SURGERY (CARDIOTHORACIC VASCULAR SURGERY)

## 2021-04-19 PROCEDURE — 77010033711 HC HIGH FLOW OXYGEN

## 2021-04-19 PROCEDURE — 74011000250 HC RX REV CODE- 250: Performed by: NURSE ANESTHETIST, CERTIFIED REGISTERED

## 2021-04-19 PROCEDURE — 77030018547 HC SUT ETHBND1 J&J -B: Performed by: THORACIC SURGERY (CARDIOTHORACIC VASCULAR SURGERY)

## 2021-04-19 PROCEDURE — C1751 CATH, INF, PER/CENT/MIDLINE: HCPCS | Performed by: NURSE ANESTHETIST, CERTIFIED REGISTERED

## 2021-04-19 PROCEDURE — 77030002986 HC SUT PROL J&J -A: Performed by: THORACIC SURGERY (CARDIOTHORACIC VASCULAR SURGERY)

## 2021-04-19 PROCEDURE — 77030002912 HC SUT ETHBND J&J -A: Performed by: THORACIC SURGERY (CARDIOTHORACIC VASCULAR SURGERY)

## 2021-04-19 PROCEDURE — 36600 WITHDRAWAL OF ARTERIAL BLOOD: CPT

## 2021-04-19 PROCEDURE — 77030005401 HC CATH RAD ARRO -A: Performed by: NURSE ANESTHETIST, CERTIFIED REGISTERED

## 2021-04-19 PROCEDURE — 77030040393 HC DRSG OPTIFOAM GENT MDII -B

## 2021-04-19 PROCEDURE — 77030012890

## 2021-04-19 PROCEDURE — 99223 1ST HOSP IP/OBS HIGH 75: CPT | Performed by: INTERNAL MEDICINE

## 2021-04-19 PROCEDURE — 77030013292 HC BOWL MX PRSM J&J -A: Performed by: NURSE ANESTHETIST, CERTIFIED REGISTERED

## 2021-04-19 PROCEDURE — 77030002987 HC SUT PROL J&J -B: Performed by: THORACIC SURGERY (CARDIOTHORACIC VASCULAR SURGERY)

## 2021-04-19 PROCEDURE — 85730 THROMBOPLASTIN TIME PARTIAL: CPT

## 2021-04-19 PROCEDURE — 06BQ4ZZ EXCISION OF LEFT SAPHENOUS VEIN, PERCUTANEOUS ENDOSCOPIC APPROACH: ICD-10-PCS | Performed by: THORACIC SURGERY (CARDIOTHORACIC VASCULAR SURGERY)

## 2021-04-19 PROCEDURE — 74011250636 HC RX REV CODE- 250/636: Performed by: NURSE ANESTHETIST, CERTIFIED REGISTERED

## 2021-04-19 PROCEDURE — 77030010512 HC APPL CLP LIG J&J -C: Performed by: THORACIC SURGERY (CARDIOTHORACIC VASCULAR SURGERY)

## 2021-04-19 PROCEDURE — 85610 PROTHROMBIN TIME: CPT

## 2021-04-19 PROCEDURE — 74011000302 HC RX REV CODE- 302: Performed by: PHYSICIAN ASSISTANT

## 2021-04-19 RX ORDER — CEFAZOLIN SODIUM 1 G/3ML
INJECTION, POWDER, FOR SOLUTION INTRAMUSCULAR; INTRAVENOUS AS NEEDED
Status: DISCONTINUED | OUTPATIENT
Start: 2021-04-19 | End: 2021-04-19 | Stop reason: HOSPADM

## 2021-04-19 RX ORDER — PROTAMINE SULFATE 10 MG/ML
INJECTION, SOLUTION INTRAVENOUS AS NEEDED
Status: DISCONTINUED | OUTPATIENT
Start: 2021-04-19 | End: 2021-04-19 | Stop reason: HOSPADM

## 2021-04-19 RX ORDER — KETOROLAC TROMETHAMINE 15 MG/ML
15 INJECTION, SOLUTION INTRAMUSCULAR; INTRAVENOUS
Status: COMPLETED | OUTPATIENT
Start: 2021-04-19 | End: 2021-04-20

## 2021-04-19 RX ORDER — MORPHINE SULFATE 4 MG/ML
3-5 INJECTION INTRAVENOUS
Status: DISCONTINUED | OUTPATIENT
Start: 2021-04-19 | End: 2021-04-20

## 2021-04-19 RX ORDER — MIDAZOLAM HYDROCHLORIDE 1 MG/ML
2 INJECTION, SOLUTION INTRAMUSCULAR; INTRAVENOUS
Status: CANCELLED | OUTPATIENT
Start: 2021-04-19 | End: 2021-04-20

## 2021-04-19 RX ORDER — MAGNESIUM SULFATE 1 G/100ML
1 INJECTION INTRAVENOUS AS NEEDED
Status: DISCONTINUED | OUTPATIENT
Start: 2021-04-19 | End: 2021-04-20

## 2021-04-19 RX ORDER — SODIUM CHLORIDE, SODIUM LACTATE, POTASSIUM CHLORIDE, CALCIUM CHLORIDE 600; 310; 30; 20 MG/100ML; MG/100ML; MG/100ML; MG/100ML
INJECTION, SOLUTION INTRAVENOUS
Status: DISCONTINUED | OUTPATIENT
Start: 2021-04-19 | End: 2021-04-19 | Stop reason: HOSPADM

## 2021-04-19 RX ORDER — VECURONIUM BROMIDE FOR INJECTION 1 MG/ML
INJECTION, POWDER, LYOPHILIZED, FOR SOLUTION INTRAVENOUS AS NEEDED
Status: DISCONTINUED | OUTPATIENT
Start: 2021-04-19 | End: 2021-04-19 | Stop reason: HOSPADM

## 2021-04-19 RX ORDER — POTASSIUM CHLORIDE 14.9 MG/ML
10 INJECTION INTRAVENOUS AS NEEDED
Status: DISCONTINUED | OUTPATIENT
Start: 2021-04-19 | End: 2021-04-20

## 2021-04-19 RX ORDER — SODIUM CHLORIDE 9 MG/ML
25 INJECTION, SOLUTION INTRAVENOUS CONTINUOUS
Status: DISCONTINUED | OUTPATIENT
Start: 2021-04-19 | End: 2021-04-20

## 2021-04-19 RX ORDER — ACETAMINOPHEN 325 MG/1
650 TABLET ORAL
Status: DISCONTINUED | OUTPATIENT
Start: 2021-04-19 | End: 2021-04-23 | Stop reason: HOSPADM

## 2021-04-19 RX ORDER — KETOROLAC TROMETHAMINE 30 MG/ML
30 INJECTION, SOLUTION INTRAMUSCULAR; INTRAVENOUS
Status: COMPLETED | OUTPATIENT
Start: 2021-04-19 | End: 2021-04-19

## 2021-04-19 RX ORDER — ALBUMIN HUMAN 50 G/1000ML
SOLUTION INTRAVENOUS AS NEEDED
Status: DISCONTINUED | OUTPATIENT
Start: 2021-04-19 | End: 2021-04-19 | Stop reason: HOSPADM

## 2021-04-19 RX ORDER — CEFAZOLIN SODIUM/WATER 2 G/20 ML
2 SYRINGE (ML) INTRAVENOUS EVERY 8 HOURS
Status: COMPLETED | OUTPATIENT
Start: 2021-04-19 | End: 2021-04-20

## 2021-04-19 RX ORDER — EPHEDRINE SULFATE/0.9% NACL/PF 50 MG/5 ML
SYRINGE (ML) INTRAVENOUS AS NEEDED
Status: DISCONTINUED | OUTPATIENT
Start: 2021-04-19 | End: 2021-04-19 | Stop reason: HOSPADM

## 2021-04-19 RX ORDER — DEXTROSE, SODIUM CHLORIDE, AND POTASSIUM CHLORIDE 5; .45; .15 G/100ML; G/100ML; G/100ML
25 INJECTION INTRAVENOUS CONTINUOUS
Status: DISCONTINUED | OUTPATIENT
Start: 2021-04-19 | End: 2021-04-20

## 2021-04-19 RX ORDER — DEXMEDETOMIDINE HYDROCHLORIDE 4 UG/ML
.1-1.5 INJECTION, SOLUTION INTRAVENOUS
Status: DISCONTINUED | OUTPATIENT
Start: 2021-04-19 | End: 2021-04-20

## 2021-04-19 RX ORDER — SUFENTANIL CITRATE 50 UG/ML
INJECTION EPIDURAL; INTRAVENOUS AS NEEDED
Status: DISCONTINUED | OUTPATIENT
Start: 2021-04-19 | End: 2021-04-19 | Stop reason: HOSPADM

## 2021-04-19 RX ORDER — SODIUM CHLORIDE 0.9 % (FLUSH) 0.9 %
5-40 SYRINGE (ML) INJECTION AS NEEDED
Status: DISCONTINUED | OUTPATIENT
Start: 2021-04-19 | End: 2021-04-23 | Stop reason: HOSPADM

## 2021-04-19 RX ORDER — AMIODARONE HYDROCHLORIDE 200 MG/1
200 TABLET ORAL 2 TIMES DAILY
Status: DISCONTINUED | OUTPATIENT
Start: 2021-04-19 | End: 2021-04-20

## 2021-04-19 RX ORDER — SODIUM CHLORIDE 9 MG/ML
INJECTION, SOLUTION INTRAVENOUS
Status: DISCONTINUED | OUTPATIENT
Start: 2021-04-19 | End: 2021-04-19 | Stop reason: HOSPADM

## 2021-04-19 RX ORDER — OXYCODONE AND ACETAMINOPHEN 5; 325 MG/1; MG/1
1 TABLET ORAL
Status: DISCONTINUED | OUTPATIENT
Start: 2021-04-19 | End: 2021-04-20

## 2021-04-19 RX ORDER — ONDANSETRON 2 MG/ML
4-8 INJECTION INTRAMUSCULAR; INTRAVENOUS
Status: DISCONTINUED | OUTPATIENT
Start: 2021-04-19 | End: 2021-04-20

## 2021-04-19 RX ORDER — LIDOCAINE HYDROCHLORIDE 20 MG/ML
INJECTION, SOLUTION EPIDURAL; INFILTRATION; INTRACAUDAL; PERINEURAL AS NEEDED
Status: DISCONTINUED | OUTPATIENT
Start: 2021-04-19 | End: 2021-04-19 | Stop reason: HOSPADM

## 2021-04-19 RX ORDER — ATORVASTATIN CALCIUM 80 MG/1
80 TABLET, FILM COATED ORAL
Status: DISCONTINUED | OUTPATIENT
Start: 2021-04-19 | End: 2021-04-23 | Stop reason: HOSPADM

## 2021-04-19 RX ORDER — SODIUM CHLORIDE, SODIUM LACTATE, POTASSIUM CHLORIDE, CALCIUM CHLORIDE 600; 310; 30; 20 MG/100ML; MG/100ML; MG/100ML; MG/100ML
75 INJECTION, SOLUTION INTRAVENOUS CONTINUOUS
Status: CANCELLED | OUTPATIENT
Start: 2021-04-19 | End: 2021-04-20

## 2021-04-19 RX ORDER — NITROGLYCERIN 20 MG/100ML
INJECTION INTRAVENOUS
Status: DISCONTINUED | OUTPATIENT
Start: 2021-04-19 | End: 2021-04-19 | Stop reason: HOSPADM

## 2021-04-19 RX ORDER — DEXTROSE 50 % IN WATER (D50W) INTRAVENOUS SYRINGE
25 AS NEEDED
Status: DISCONTINUED | OUTPATIENT
Start: 2021-04-19 | End: 2021-04-20

## 2021-04-19 RX ORDER — SODIUM CHLORIDE 0.9 % (FLUSH) 0.9 %
5-40 SYRINGE (ML) INJECTION EVERY 8 HOURS
Status: DISCONTINUED | OUTPATIENT
Start: 2021-04-19 | End: 2021-04-23 | Stop reason: HOSPADM

## 2021-04-19 RX ORDER — KETOROLAC TROMETHAMINE 15 MG/ML
15 INJECTION, SOLUTION INTRAMUSCULAR; INTRAVENOUS EVERY 6 HOURS
Status: DISCONTINUED | OUTPATIENT
Start: 2021-04-19 | End: 2021-04-19

## 2021-04-19 RX ORDER — SODIUM CHLORIDE 9 MG/ML
250 INJECTION, SOLUTION INTRAVENOUS AS NEEDED
Status: DISCONTINUED | OUTPATIENT
Start: 2021-04-19 | End: 2021-04-20

## 2021-04-19 RX ORDER — MIDAZOLAM HYDROCHLORIDE 1 MG/ML
1 INJECTION, SOLUTION INTRAMUSCULAR; INTRAVENOUS
Status: DISCONTINUED | OUTPATIENT
Start: 2021-04-19 | End: 2021-04-20

## 2021-04-19 RX ORDER — HEPARIN SODIUM 1000 [USP'U]/ML
INJECTION, SOLUTION INTRAVENOUS; SUBCUTANEOUS AS NEEDED
Status: DISCONTINUED | OUTPATIENT
Start: 2021-04-19 | End: 2021-04-19 | Stop reason: HOSPADM

## 2021-04-19 RX ORDER — ETOMIDATE 2 MG/ML
INJECTION INTRAVENOUS AS NEEDED
Status: DISCONTINUED | OUTPATIENT
Start: 2021-04-19 | End: 2021-04-19 | Stop reason: HOSPADM

## 2021-04-19 RX ORDER — CHLORHEXIDINE GLUCONATE 1.2 MG/ML
10 RINSE ORAL 2 TIMES DAILY
Status: DISCONTINUED | OUTPATIENT
Start: 2021-04-19 | End: 2021-04-19

## 2021-04-19 RX ORDER — NOREPINEPHRINE BITARTRATE/D5W 4MG/250ML
.01-.5 PLASTIC BAG, INJECTION (ML) INTRAVENOUS
Status: DISCONTINUED | OUTPATIENT
Start: 2021-04-19 | End: 2021-04-20

## 2021-04-19 RX ORDER — MIDAZOLAM HYDROCHLORIDE 1 MG/ML
INJECTION, SOLUTION INTRAMUSCULAR; INTRAVENOUS AS NEEDED
Status: DISCONTINUED | OUTPATIENT
Start: 2021-04-19 | End: 2021-04-19 | Stop reason: HOSPADM

## 2021-04-19 RX ORDER — CARVEDILOL 6.25 MG/1
6.25 TABLET ORAL EVERY 12 HOURS
Status: DISCONTINUED | OUTPATIENT
Start: 2021-04-20 | End: 2021-04-23 | Stop reason: HOSPADM

## 2021-04-19 RX ORDER — NITROGLYCERIN 20 MG/100ML
0-20 INJECTION INTRAVENOUS
Status: DISCONTINUED | OUTPATIENT
Start: 2021-04-19 | End: 2021-04-20

## 2021-04-19 RX ORDER — OXYCODONE AND ACETAMINOPHEN 10; 325 MG/1; MG/1
1 TABLET ORAL
Status: DISCONTINUED | OUTPATIENT
Start: 2021-04-19 | End: 2021-04-23 | Stop reason: HOSPADM

## 2021-04-19 RX ORDER — PAPAVERINE HYDROCHLORIDE 30 MG/ML
INJECTION INTRAMUSCULAR; INTRAVENOUS AS NEEDED
Status: DISCONTINUED | OUTPATIENT
Start: 2021-04-19 | End: 2021-04-19 | Stop reason: HOSPADM

## 2021-04-19 RX ORDER — NALOXONE HYDROCHLORIDE 0.4 MG/ML
0.4 INJECTION, SOLUTION INTRAMUSCULAR; INTRAVENOUS; SUBCUTANEOUS AS NEEDED
Status: DISCONTINUED | OUTPATIENT
Start: 2021-04-19 | End: 2021-04-20

## 2021-04-19 RX ORDER — DOBUTAMINE HYDROCHLORIDE 200 MG/100ML
0-10 INJECTION INTRAVENOUS
Status: DISCONTINUED | OUTPATIENT
Start: 2021-04-19 | End: 2021-04-20

## 2021-04-19 RX ADMIN — MORPHINE SULFATE 3 MG: 4 INJECTION INTRAVENOUS at 17:38

## 2021-04-19 RX ADMIN — MIDAZOLAM 2 MG: 1 INJECTION INTRAMUSCULAR; INTRAVENOUS at 12:56

## 2021-04-19 RX ADMIN — PROTAMINE SULFATE 250 MG: 10 INJECTION, SOLUTION INTRAVENOUS at 12:05

## 2021-04-19 RX ADMIN — EPINEPHRINE 0.02 MCG/KG/MIN: 1 INJECTION INTRAMUSCULAR; INTRAVENOUS; SUBCUTANEOUS at 11:50

## 2021-04-19 RX ADMIN — DEXMEDETOMIDINE 0.5 MCG/KG/HR: 100 INJECTION, SOLUTION, CONCENTRATE INTRAVENOUS at 12:18

## 2021-04-19 RX ADMIN — MIDAZOLAM 3 MG: 1 INJECTION INTRAMUSCULAR; INTRAVENOUS at 11:36

## 2021-04-19 RX ADMIN — Medication 10 MG: at 08:00

## 2021-04-19 RX ADMIN — AMIODARONE HYDROCHLORIDE 200 MG: 200 TABLET ORAL at 21:14

## 2021-04-19 RX ADMIN — SODIUM CHLORIDE: 900 INJECTION, SOLUTION INTRAVENOUS at 07:49

## 2021-04-19 RX ADMIN — ALBUMIN HUMAN 250 ML: 0.05 INJECTION, SOLUTION INTRAVENOUS at 12:32

## 2021-04-19 RX ADMIN — TUBERCULIN PURIFIED PROTEIN DERIVATIVE 5 UNITS: 5 INJECTION, SOLUTION INTRADERMAL at 21:40

## 2021-04-19 RX ADMIN — Medication 1 AMPULE: at 21:14

## 2021-04-19 RX ADMIN — SODIUM CHLORIDE, SODIUM LACTATE, POTASSIUM CHLORIDE, AND CALCIUM CHLORIDE: 600; 310; 30; 20 INJECTION, SOLUTION INTRAVENOUS at 07:12

## 2021-04-19 RX ADMIN — SUFENTANIL CITRATE 15 MCG: 50 INJECTION EPIDURAL; INTRAVENOUS at 07:36

## 2021-04-19 RX ADMIN — LIDOCAINE HYDROCHLORIDE 100 MG: 20 INJECTION, SOLUTION EPIDURAL; INFILTRATION; INTRACAUDAL; PERINEURAL at 07:28

## 2021-04-19 RX ADMIN — KETOROLAC TROMETHAMINE 30 MG: 30 INJECTION, SOLUTION INTRAMUSCULAR at 16:13

## 2021-04-19 RX ADMIN — VECURONIUM BROMIDE 5 MG: 1 INJECTION, POWDER, LYOPHILIZED, FOR SOLUTION INTRAVENOUS at 07:42

## 2021-04-19 RX ADMIN — CEFAZOLIN 2 G: 1 INJECTION, POWDER, FOR SOLUTION INTRAVENOUS at 11:30

## 2021-04-19 RX ADMIN — FAMOTIDINE 20 MG: 20 TABLET, FILM COATED ORAL at 04:06

## 2021-04-19 RX ADMIN — SUFENTANIL CITRATE 25 MCG: 50 INJECTION EPIDURAL; INTRAVENOUS at 09:09

## 2021-04-19 RX ADMIN — VECURONIUM BROMIDE 2 MG: 1 INJECTION, POWDER, LYOPHILIZED, FOR SOLUTION INTRAVENOUS at 10:02

## 2021-04-19 RX ADMIN — MORPHINE SULFATE 3 MG: 4 INJECTION INTRAVENOUS at 14:13

## 2021-04-19 RX ADMIN — SUFENTANIL CITRATE 25 MCG: 50 INJECTION EPIDURAL; INTRAVENOUS at 08:21

## 2021-04-19 RX ADMIN — Medication 3 AMPULE: at 04:06

## 2021-04-19 RX ADMIN — VECURONIUM BROMIDE 2 MG: 1 INJECTION, POWDER, LYOPHILIZED, FOR SOLUTION INTRAVENOUS at 11:03

## 2021-04-19 RX ADMIN — AMIODARONE HYDROCHLORIDE 600 MG: 200 TABLET ORAL at 04:06

## 2021-04-19 RX ADMIN — SUFENTANIL CITRATE 15 MCG: 50 INJECTION EPIDURAL; INTRAVENOUS at 07:28

## 2021-04-19 RX ADMIN — MIDAZOLAM 2 MG: 1 INJECTION INTRAMUSCULAR; INTRAVENOUS at 07:12

## 2021-04-19 RX ADMIN — OXYCODONE HYDROCHLORIDE AND ACETAMINOPHEN 1 TABLET: 5; 325 TABLET ORAL at 16:00

## 2021-04-19 RX ADMIN — KETOROLAC TROMETHAMINE 15 MG: 15 INJECTION, SOLUTION INTRAMUSCULAR; INTRAVENOUS at 23:16

## 2021-04-19 RX ADMIN — SUFENTANIL CITRATE 20 MCG: 50 INJECTION EPIDURAL; INTRAVENOUS at 08:16

## 2021-04-19 RX ADMIN — PHENYLEPHRINE HYDROCHLORIDE 60 MCG: 10 INJECTION INTRAVENOUS at 08:25

## 2021-04-19 RX ADMIN — VECURONIUM BROMIDE 2 MG: 1 INJECTION, POWDER, LYOPHILIZED, FOR SOLUTION INTRAVENOUS at 08:16

## 2021-04-19 RX ADMIN — CARVEDILOL 6.25 MG: 6.25 TABLET, FILM COATED ORAL at 04:06

## 2021-04-19 RX ADMIN — DEXTROSE MONOHYDRATE, SODIUM CHLORIDE, AND POTASSIUM CHLORIDE 25 ML/HR: 50; 4.5; 1.49 INJECTION, SOLUTION INTRAVENOUS at 13:51

## 2021-04-19 RX ADMIN — FAMOTIDINE 20 MG: 10 INJECTION INTRAVENOUS at 21:14

## 2021-04-19 RX ADMIN — PHENYLEPHRINE HYDROCHLORIDE 60 MCG: 10 INJECTION INTRAVENOUS at 10:00

## 2021-04-19 RX ADMIN — ETOMIDATE 20 MG: 2 INJECTION, SOLUTION INTRAVENOUS at 07:28

## 2021-04-19 RX ADMIN — MIDAZOLAM 1 MG: 1 INJECTION INTRAMUSCULAR; INTRAVENOUS at 07:28

## 2021-04-19 RX ADMIN — CEFAZOLIN 2 G: 10 INJECTION, POWDER, FOR SOLUTION INTRAVENOUS at 21:14

## 2021-04-19 RX ADMIN — SODIUM CHLORIDE, SODIUM LACTATE, POTASSIUM CHLORIDE, AND CALCIUM CHLORIDE: 600; 310; 30; 20 INJECTION, SOLUTION INTRAVENOUS at 07:42

## 2021-04-19 RX ADMIN — PHENYLEPHRINE HYDROCHLORIDE 60 MCG: 10 INJECTION INTRAVENOUS at 09:54

## 2021-04-19 RX ADMIN — VECURONIUM BROMIDE 3 MG: 1 INJECTION, POWDER, LYOPHILIZED, FOR SOLUTION INTRAVENOUS at 08:51

## 2021-04-19 RX ADMIN — Medication 10 ML: at 21:24

## 2021-04-19 RX ADMIN — HEPARIN SODIUM 30000 UNITS: 1000 INJECTION, SOLUTION INTRAVENOUS; SUBCUTANEOUS at 09:53

## 2021-04-19 RX ADMIN — Medication 10 ML: at 13:49

## 2021-04-19 RX ADMIN — ATORVASTATIN CALCIUM 80 MG: 80 TABLET, FILM COATED ORAL at 21:14

## 2021-04-19 RX ADMIN — MIDAZOLAM 1 MG: 1 INJECTION INTRAMUSCULAR; INTRAVENOUS at 07:16

## 2021-04-19 RX ADMIN — MIDAZOLAM 1 MG: 1 INJECTION INTRAMUSCULAR; INTRAVENOUS at 07:19

## 2021-04-19 RX ADMIN — SODIUM CHLORIDE 1 G/HR: 900 INJECTION, SOLUTION INTRAVENOUS at 08:33

## 2021-04-19 RX ADMIN — OXYCODONE HYDROCHLORIDE AND ACETAMINOPHEN 1 TABLET: 10; 325 TABLET ORAL at 20:07

## 2021-04-19 RX ADMIN — SODIUM CHLORIDE 25 ML/HR: 900 INJECTION, SOLUTION INTRAVENOUS at 13:43

## 2021-04-19 RX ADMIN — CEFAZOLIN 2 G: 1 INJECTION, POWDER, FOR SOLUTION INTRAVENOUS at 08:00

## 2021-04-19 RX ADMIN — SUFENTANIL CITRATE 25 MCG: 50 INJECTION EPIDURAL; INTRAVENOUS at 13:00

## 2021-04-19 RX ADMIN — VECURONIUM BROMIDE 3 MG: 1 INJECTION, POWDER, LYOPHILIZED, FOR SOLUTION INTRAVENOUS at 11:36

## 2021-04-19 RX ADMIN — PHENYLEPHRINE HYDROCHLORIDE 120 MCG: 10 INJECTION INTRAVENOUS at 10:11

## 2021-04-19 RX ADMIN — VECURONIUM BROMIDE 2 MG: 1 INJECTION, POWDER, LYOPHILIZED, FOR SOLUTION INTRAVENOUS at 09:30

## 2021-04-19 RX ADMIN — NITROGLYCERIN 10 MCG/MIN: 20 INJECTION INTRAVENOUS at 05:25

## 2021-04-19 RX ADMIN — ASPIRIN 81 MG: 81 TABLET ORAL at 04:06

## 2021-04-19 RX ADMIN — Medication 10 MG: at 07:43

## 2021-04-19 RX ADMIN — SUFENTANIL CITRATE 25 MCG: 50 INJECTION EPIDURAL; INTRAVENOUS at 09:46

## 2021-04-19 RX ADMIN — NITROGLYCERIN 10 MCG/MIN: 200 INJECTION, SOLUTION INTRAVENOUS at 07:12

## 2021-04-19 RX ADMIN — SUFENTANIL CITRATE 25 MCG: 50 INJECTION EPIDURAL; INTRAVENOUS at 08:23

## 2021-04-19 RX ADMIN — DEXTROSE MONOHYDRATE 10 G: 5 INJECTION, SOLUTION INTRAVENOUS at 08:00

## 2021-04-19 NOTE — PROGRESS NOTES
CM continues to follow for discharge planning and/or CM needs. Pt s/p CV surgery this day. Discharge plan pending clinical progress. No additional CM needs voiced or noted at this time. Will continue to monitor and update as needed.

## 2021-04-19 NOTE — PROGRESS NOTES
Respiratory Mechanics completed and are as follows:  Weaning Parameters  Spontaneous Breathing Trial Complete: Yes  Resp Rate Observed: 19  Ve: 10.4  VT: 608  RSBI: 32  VC: 1078  Jacinto Agitation Sedation Scale (RASS): Light sedation  Patient extubated to a 40L/40% HHNC. Patient is able to communicate and is negative for stridor. Breath sounds are diminished. No complications with extubation. Dr. Refugio Singh at bedside with verbal order to extubate.      Flavia Sylvester, RT

## 2021-04-19 NOTE — PROGRESS NOTES
Dual skin assessment performed. Sacrum with an opti foam, peeled back revealed intact skin and replaced for maximum effectiveness in preventing sacral pressure injuries by relieving pressure to the area that the optifoam is applied. The heels are without injury. The MS incision is covered with gauze and tape, and no breakthrough drainage is present. The LLE incisions are covered with gauze and ace wrap, and no breakthrough drainage is present. The 3 chest tubes are present and covered with Vaseline gauze and 4x4s, no breakthrough drianage present at these sites either. The patient's integument is covered with tattoos, and some scattered scars from previous surgeries and incidents. The pt has no further abnormalities that are of concern to the integument.

## 2021-04-19 NOTE — ANESTHESIA PROCEDURE NOTES
[place]  Date/Time: 4/19/2021 8:05 AM      Procedure Details: probe placement, image aquisition & interpretation    08:05      Procedure Note    Performed by: Walter Lacy MD  Authorized by: Walter Lacy MD       Indications: assessment of surgical repair  Modalities: 2D, CF  Probe Type: biplane  Insertion: atraumatic  Patient Status: intubated and sedated     Valves  Annulus  Stenosis  Area/Grad  Regurg  Leaflet   Morph  Leaflet   Motion    Aortic calcified none  0 calcified normal    Mitral normal none  2+ normal normal    Tricuspid normal none  1+ normal normal          Atria  Size  SEC (smoke)  Thrombus  Tumor  Device    Rt Atrium normal  No No Yes    Lt Atrium normal  No No No     Interatrial Septum Morphology: normal    Interventricular Septum Morphology: normal, hypertrophy    Ventricle  Cavity Size  Cavity Dimension Hypertrophy  Thrombus  Gloal FXN  EF    RV normal  No no mildly impaired     LV normal  Yes No moderately impaired        Regional Function  (1 = normal, 2 = mildly hypokinetic, 3 = severely hypokinetic, 4 = akinetic, 5 = dyskinetic) LAV - [place]   ME LAV = 0  ME LAV = 90  ME LAV = 130                                     Pericardium: normal    Post Intervention Follow-up Study  Ventricular Global Function: improved  Ventricular Regional Function: improved     Valve  Function  Regurgitation  Area    Aortic no change 0     Mitral no change 1+     Tricuspid no change 1+     Prosthetic        Complications: None

## 2021-04-19 NOTE — ANESTHESIA PROCEDURE NOTES
Arterial Line Placement    Start time: 4/19/2021 7:19 AM  End time: 4/19/2021 7:22 AM  Performed by: Ziyad Salgado CRNA  Authorized by: Evelina Goldmann, MD     Pre-Procedure  Indications:  Arterial pressure monitoring and blood sampling  Preanesthetic Checklist: patient identified, risks and benefits discussed, anesthesia consent, site marked, patient being monitored, timeout performed and patient being monitored    Timeout Time: 07:19        Procedure:   Prep:  ChloraPrep  Seldinger Technique?: Yes    Orientation:  Left  Location:  Radial artery  Catheter size:  20 G  Number of attempts:  1  Cont Cardiac Output Sensor: No      Assessment:   Post-procedure:  Line secured and sterile dressing applied  Patient Tolerance:  Patient tolerated the procedure well with no immediate complications  Comment:   Left arm prepped with ChloraPrep, 0.8ml of 1% lidocaine infiltrated at skin, ultrasound guided Seldinger technique, good blood return, good waveform. Potential access sites were examined with ultrasound and acceptable patent access site selected as noted above. Needle path and artery access visualized in real time using ultrasound, an image of wire in vessel recorded for permanent record.

## 2021-04-19 NOTE — ANESTHESIA POSTPROCEDURE EVALUATION
Procedure(s):  CORONARY ARTERY BYPASS GRAFT (CABG X3)  STERNOTOMY REDO. general    Anesthesia Post Evaluation      Multimodal analgesia: multimodal analgesia not used between 6 hours prior to anesthesia start to PACU discharge  Patient location during evaluation: ICU  Level of consciousness: obtunded/minimal responses  Pain management: adequate  Airway patency: patent  Anesthetic complications: no  Cardiovascular status: acceptable  Respiratory status: acceptable, ETT and ventilator  Hydration status: acceptable  Post anesthesia nausea and vomiting:  none  Final Post Anesthesia Temperature Assessment:  Normothermia (36.0-37.5 degrees C)      INITIAL Post-op Vital signs:   Vitals Value Taken Time   BP 92/52 04/19/21 1307   Temp 37.6 °C (99.6 °F) 04/19/21 1325   Pulse 68 04/19/21 1325   Resp 15 04/19/21 1325   SpO2 97 % 04/19/21 1325   Vitals shown include unvalidated device data.

## 2021-04-19 NOTE — CONSULTS
Cardiovascular ICU Consult Note: 2021  Kenisha Guerrero  Admission Date: 2021     The patient's chart is reviewed and the patient is discussed with the staff. Subjective:     Patient is seen at the request of Dr. Juma Mcduffie for respiratory management status post cardiac surgery. Patient had CAD s/p CABG in , HTN, ICM, chronic systolic CHF, chronic pain, and GERD. Pt presented to [address] [place] [address] on 21 with chest pain. His EKG revealed inferior STEMI and [place] [facility] Pt was found to have MVCAD. Pt was seen by CT surgery and deemed appropriate for redo sternotomy. Currently is sedated in CV-ICU and orally intubated receiving  mechanical ventilation. We have been asked to see in the CV-ICU for mechanical ventilation management and weaning. Prior to Admission Medications   Prescriptions Last Dose Informant Patient Reported? Taking?   aspirin delayed-release 81 mg tablet 2021 at 0816  Yes No   Sig: Take  by mouth daily. atorvastatin (Lipitor) 80 mg tablet 2021 at 0816  No Yes   Sig: Take 1 Tab by mouth daily. losartan (COZAAR) 25 mg tablet 2021 at 0816  No Yes   Sig: Take 1 Tab by mouth daily. multivitamin capsule 4/15/2021 at 0800  Yes No   Sig: Take 1 Cap by mouth daily. nebivoloL (BYSTOLIC) 2.5 mg tablet 6131 at 0800  No Yes   Sig: Take 1 Tab by mouth daily. nitroglycerin (NITROSTAT) 0.4 mg SL tablet 4/15/2021  No No   Si Tab by SubLINGual route every five (5) minutes as needed (If no relief after 3rd tab call 911). sildenafil citrate (Viagra) 50 mg tablet Not Taking at Unknown time  Yes No   Sig: Take 50 mg by mouth as needed for Erectile Dysfunction. Facility-Administered Medications: None       Review of Systems  Review of systems not obtained due to patient factors.     Past Medical History:   Diagnosis Date    CAD (coronary artery disease)     MI 0, CABG , STENTS     Chest pain 2016    Chronic pain  DizzinessVertigo 4/29/2016    GERD (gastroesophageal reflux disease)     Heart failure (HCC)     Myocardial infarction, anterior wall/, subsequent ([facility] [address] 4/29/2016    Other ill-defined conditions(799.89)     ISCHEMIC CARDIOMYOPATHY EF 35% 2010    Unstable angina ([facility] [address] 12/13/2010    Unstale Angina Acute coronary syndrome ([facility] [address] 4/29/2016     Past Surgical History:   Procedure Laterality Date    HX HEART CATHETERIZATION  7/18/2012    no intervention    HX ORTHOPAEDIC      plate n screws in L arm  surgery on R leg    AK CARDIAC SURG PROCEDURE UNLIST      cabg x1    AK CARDIAC SURG PROCEDURE UNLIST      stent x4     Social History     Socioeconomic History    Marital status:      Spouse name: Not on file    Number of children: Not on file    Years of education: Not on file    Highest education level: Not on file   Occupational History    Not on file   Social Needs    Financial resource strain: Not on file    Food insecurity     Worry: Not on file     Inability: Not on file    Transportation needs     Medical: Not on file     Non-medical: Not on file   Tobacco Use    Smoking status: Never Smoker    Smokeless tobacco: Never Used   Substance and Sexual Activity    Alcohol use: Yes     Comment: social    Drug use: No    Sexual activity: Never   Lifestyle    Physical activity     Days per week: Not on file     Minutes per session: Not on file    Stress: Not on file   Relationships    Social connections     Talks on phone: Not on file     Gets together: Not on file     Attends Evangelical service: Not on file     Active member of club or organization: Not on file     Attends meetings of clubs or organizations: Not on file     Relationship status: Not on file    Intimate partner violence     Fear of current or ex partner: Not on file     Emotionally abused: Not on file     Physically abused: Not on file     Forced sexual activity: Not on file   Other Topics Concern    Not on file   Social History Narrative    Not on file     Family History   Problem Relation Age of Onset    Heart Disease Mother [de-identified]        STENTS HEART    Diabetes Mother     Heart Disease Father     Cancer Father         lung and prostate cancer    Diabetes Father     Heart Disease Brother     Diabetes Maternal Grandmother     Hypertension Maternal Grandmother     Heart Disease Paternal Grandfather      Allergies   Allergen Reactions    Codeine Rash    Nexium [Esomeprazole Magnesium] Other (comments)     headache    Niacin Other (comments)     SEVERE FLUSHING         Current Facility-Administered Medications   Medication Dose Route Frequency    0.9% sodium chloride infusion 250 mL  250 mL IntraVENous PRN    0.9% sodium chloride infusion  25 mL/hr IntraVENous CONTINUOUS    dextrose 5% - 0.45% NaCl with KCl 20 mEq/L infusion  25 mL/hr IntraVENous CONTINUOUS    sodium chloride (NS) flush 5-40 mL  5-40 mL IntraVENous Q8H    sodium chloride (NS) flush 5-40 mL  5-40 mL IntraVENous PRN    acetaminophen (TYLENOL) tablet 650 mg  650 mg Oral Q4H PRN    oxyCODONE-acetaminophen (PERCOCET) 5-325 mg per tablet 1 Tab  1 Tab Oral Q4H PRN    morphine injection 3-5 mg  3-5 mg IntraVENous Q1H PRN    naloxone (NARCAN) injection 0.4 mg  0.4 mg IntraVENous PRN    ceFAZolin (ANCEF) 2 g/20 mL in sterile water IV syringe  2 g IntraVENous Q8H    DOBUTamine (DOBUTREX) 500 mg/250 mL (2,000 mcg/mL) infusion  0-10 mcg/kg/min IntraVENous TITRATE    EPINEPHrine (ADRENALIN) 4 mg in 0.9% sodium chloride 250 mL infusion  1-10 mcg/min IntraVENous TITRATE    nitroglycerin (Tridil) 200 mcg/ml infusion  0-20 mcg/min IntraVENous TITRATE    PHENYLephrine (MAICOL-SYNEPHRINE) 30 mg in 0.9% sodium chloride 250 mL infusion   mcg/min IntraVENous TITRATE    NOREPINephrine (LEVOPHED) 4 mg in 5% dextrose 250 mL infusion  0.01-0.5 mcg/kg/min IntraVENous TITRATE    amiodarone (CORDARONE) tablet 200 mg  200 mg Oral BID    ondansetron (ZOFRAN) injection 4-8 mg  4-8 mg IntraVENous Q4H PRN    insulin regular (NOVOLIN R, HUMULIN R) 100 Units in 0.9% sodium chloride 100 mL infusion  1 Units/hr IntraVENous TITRATE    dextrose (D50W) injection syrg 12.5 g  25 mL IntraVENous PRN    magnesium sulfate 1 g/100 ml IVPB (premix or compounded)  1 g IntraVENous PRN    potassium chloride 10 mEq in 50 ml IVPB  10 mEq IntraVENous PRN    midazolam (VERSED) injection 1 mg  1 mg IntraVENous Q1H PRN    chlorhexidine (PERIDEX) 0.12 % mouthwash 10 mL  10 mL Oral BID    tuberculin injection 5 Units  5 Units IntraDERMal ONCE    famotidine (PF) (PEPCID) 20 mg in 0.9% sodium chloride 10 mL injection  20 mg IntraVENous Q12H    alcohol 62% (NOZIN) nasal  1 Ampule  1 Ampule Topical Q12H    carvediloL (COREG) tablet 6.25 mg  6.25 mg Oral Q12H    heparin (PF) 2 units/ml in NS infusion  2 Units/hr IntraarTERial CONTINUOUS    aspirin delayed-release tablet 81 mg  81 mg Oral DAILY    atorvastatin (LIPITOR) tablet 80 mg  80 mg Oral DAILY    [Held by provider] losartan (COZAAR) tablet 25 mg  25 mg Oral DAILY    nitroglycerin (Tridil) 200 mcg/ml infusion  0-200 mcg/min IntraVENous TITRATE    sodium chloride (NS) flush 5-40 mL  5-40 mL IntraVENous PRN    promethazine (PHENERGAN) with saline injection 12.5 mg  12.5 mg IntraVENous Q6H PRN     Facility-Administered Medications Ordered in Other Encounters   Medication Dose Route Frequency    vecuronium (NORCURON) injection   IntraVENous PRN    ePHEDrine in NS (PF) (MISTOLE) 10 mg/mL in NS syringe   IntraVENous PRN    aminocaproic acid (AMICAR) 5 g in dextrose 5% 250 mL infusion   IntraVENous CONTINUOUS    nitroglycerin (Tridil) 200 mcg/ml infusion    CONTINUOUS    SUFentanil (SUFENTA) injection   IntraVENous PRN    midazolam (VERSED) injection    PRN    lidocaine (PF) (XYLOCAINE) 20 mg/mL (2 %) injection   IntraVENous PRN    etomidate (AMIDATE) 2 mg/mL injection    PRN    0.9% sodium chloride infusion    CONTINUOUS    lactated Ringers infusion    CONTINUOUS    PHENYLephrine (MAICOL-SYNEPHRINE) 30,000 mcg in 0.9% sodium chloride 250 mL infusion   IntraVENous CONTINUOUS    aminocaproic acid (AMICAR) 5 g in 0.9% sodium chloride 250 mL infusion   IntraVENous CONTINUOUS    heparin (porcine) 1,000 unit/mL injection    PRN    lactated Ringers infusion   IntraVENous CONTINUOUS    EPINEPHrine (ADRENALIN) 4,000 mcg in 0.9% sodium chloride 250 mL infusion   IntraVENous CONTINUOUS    protamine injection    PRN    dexmedeTOMidine (PRECEDEX) 400 mcg in 0.9% sodium chloride 104 mL infusion   IntraVENous CONTINUOUS    albumin human 5% (BUMINATE) solution    PRN         Objective:     Vitals:    04/19/21 0630 04/19/21 0700 04/19/21 1307 04/19/21 1311   BP: 121/79 114/83 (!) 92/52    Pulse: 77 80 71 72   Resp: 14 (!) 35 16 24   Temp:   99.7 °F (37.6 °C)    SpO2: 96% 96% 94% 95%   Weight:       Height:           Intake and Output:   04/17 1901 - 04/19 0700  In: 2304.3 [P.O.:1240; I.V.:1064.3]  Out: 2700 [Urine:2700]  04/19 0701 - 04/19 1900  In: 2050 [I.V.:1700]  Out: 0 [Urine:1110]    Physical Exam:          Constitutional:  Sedated, orally intubated and mechanically ventilated. EENMT:  Sclera clear, pupils equal, oral mucosa moist and orally intubated  Respiratory: clear anteriorly  Cardiovascular:  RRR with no M,G,R;  Gastrointestinal:  soft; no bowel sounds present  Musculoskeletal:  warm with no cyanosis, no lower extremity edema. Sultana site L leg with ace wrap. Sedated with no movements. SKIN:  no jaundice or ecchymosis   Neurologic:  sedated but no gross neuro deficits  Psychiatric:  sedated and unable to assess at this time    CXR:  pending      LINES:  ETT, headley, swan padma, arterial line, chest tubes times 3 in epigastric area without air leak.     DRIPS:  Maicol, amicar, precedex    CI:  2.6    Ventilator Settings  Mode FIO2 Rate Tidal Volume Pressure PEEP   SIMV, Pressure support, VC+  75 %    0.5 ml  10 cm H2O         Peak airway pressure: 20 cm H2O   Minute ventilation: 11.3 l/min     ABG:   Recent Labs     04/19/21  1219 04/19/21  1127 04/19/21  1059   PHI 7.36 7.35 7.35   PCO2I 41.5 43.1 44.7   PO2I 168* 238* 247*   HCO3I 23.1 23.7 24.8        LAB  Recent Labs     04/18/21  0330 04/17/21 0325 04/16/21 2035   WBC 7.2 9.5 7.6   HGB 12.3* 12.2* 12.1*   HCT 37.3* 37.7* 37.4*    250 249   INR  --  1.1  --      Recent Labs     04/17/21 0325 04/16/21 2035    141   K 4.0 3.6   * 110*   CO2 25 24   * 154*   BUN 12 13   CREA 0.72* 0.74*   MG 2.2  --    CA 8.5 8.4   ALB  --  2.7*     No results for input(s): LCAD, LAC in the last 72 hours. Assessment and Plan :  (Medical Decision Making)     Hospital Problems  Date Reviewed: 4/19/2021          Codes Class Noted POA    S/P CABG x 3 ICD-10-CM: Z95.1  ICD-9-CM: V45.81  4/19/2021 Unknown    Per primary     Encounter for weaning from ventilator [facility]) ICD-10-CM: Z99.11  ICD-9-CM: V46.13  4/19/2021 Unknown    Per protocol     * (Principal) STEMI (ST elevation myocardial infarction) ([facility] [address] ICD-10-CM: I21.3  ICD-9-CM: 410.90  4/16/2021 Unknown    S/p CABG    Hypertension ICD-10-CM: I10  ICD-9-CM: 401.9  4/16/2021 Unknown        Ischemic cardiomyopathy ICD-10-CM: I25.5  ICD-9-CM: 414.8  9/19/2016 Yes    EF 25-30%    CAD (coronary artery disease), native coronary artery (Chronic) ICD-10-CM: I25.10  ICD-9-CM: 414.01  12/18/2009 Yes    Overview Addendum 7/1/2016  8:18 AM by Feli Watson MD     MI age 27 had bypass surgery and stents (1995). EF 32% Dr. Peg Huynh. On disability. EF 2016 39%. Had very limited ex capacity on stress test last year but had been doing better until this recent change. ([phone])                   Plan:   --Wean mechanical ventilation per protocol. --Bronchodilators per protocol. --Incentive spirometry every hour post extubation.   --Review CXR    More than 50% of the time documented was spent in face-to-face contact with the patient and in the care of the patient on the floor/unit where the patient is located. Thank you for this referral.  We appreciate the opportunity to participate in this patient's care. Will follow along with you. Sheryle Pares, PA   Lungs:  clear  Heart:  RRR with no Murmur/Rubs/Gallops    Additional Comments:  cxr ok post op,   Proceed with extubation    I have spoken with and examined the patient. I agree with the above assessment and plan as documented.     Maranda Vera MD

## 2021-04-19 NOTE — PROGRESS NOTES
TRANSFER - OUT REPORT:    Verbal report given to Panfilo Lehman CRNA on Annalee Meigs  being transferred to [address] [place] [address] for ordered procedure       Report consisted of patients Situation, Background, Assessment and Recommendations(SBAR). Information from the following report(s) SBAR, MAR, Recent Results and Cardiac Rhythm NSR was reviewed with the receiving nurse. Opportunity for questions and clarification was provided.

## 2021-04-19 NOTE — BRIEF OP NOTE
Brief Postoperative Note    Patient: David Zelaya  YOB: 1965  MRN: [id number]    Date of Procedure: 4/19/2021     Pre-Op Diagnosis: Atherosclerosis of native coronary artery of native heart with unstable angina pectoris ([address] [I25.110]    Post-Op Diagnosis: Same as preoperative diagnosis.       Procedure(s):  CORONARY ARTERY BYPASS GRAFT (CABG X3) sequential SVG to DIAG and to OM, SVG to the PDA  STERNOTOMY REDO    Surgeon(s):  Carlos Mahoney MD    Surgical Assistant: None    Anesthesia: General     Estimated Blood Loss (mL): Minimal    Complications: None    Specimens: * No specimens in log *     Implants: * No implants in log *    Drains:   [REMOVED] Orogastric Tube 04/19/21 (Removed)       Findings:      Electronically Signed by Beth Panchal MD on 4/19/2021 at 12:57 PM

## 2021-04-19 NOTE — PROGRESS NOTES
TIMEOUT performed prior to extubation on Adela Bass with RT, primary RN, and charge RN present on 4/19/2021 at 3:22 PM.     Per anesthesia team on admission, patient's intubation was Normal    ABG results as follows:    Lab Results   Component Value Date/Time    pH (POC) 7.34 (L) 04/19/2021 01:22 PM    pCO2 (POC) 42.2 04/19/2021 01:22 PM    pO2 (POC) 82 04/19/2021 01:22 PM    HCO3 (POC) 22.8 04/19/2021 01:22 PM    Base deficit (POC) 2.9 04/19/2021 01:22 PM         The patient is hemodynamically stable and can demonstrate the following on a consistent basis:  follow commands , elevate head off the pillow, nods appropriately to questions. Dr. Lillie Bass notified of weaning parameters and order to extubate obtained. Patient extubated by (RT name) RT Jil to (Type of O2 Device) Airvo at (Amount of of O2) 92F/78% without complication.

## 2021-04-19 NOTE — PROGRESS NOTES
Verbal bedside report given to alonso Moss RN. Patient's situation, background, assessment and recommendations provided. Opportunity for questions provided. Oncoming RN assumed care of patient. Patient ready for transport to pre-op per cabg prep protocol. All consents signed and placed on chart.

## 2021-04-19 NOTE — ANESTHESIA PROCEDURE NOTES
Central Line Placement    Start time: 4/19/2021 7:39 AM  End time: 4/19/2021 7:46 AM  Performed by: Jyotsna West MD  Authorized by: Jyotsna West MD     Indications: vascular access, central pressure monitoring and need for vasopressors  Preanesthetic Checklist: patient identified, risks and benefits discussed, anesthesia consent, site marked, patient being monitored and timeout performed      Pre-procedure: All elements of maximal sterile barrier technique followed? Yes    2% Chlorhexidine for cutaneous antisepsis and Hand hygiene performed prior to catheter insertion              Procedure:   Prep:  Chlorhexidine  Location: internal jugular  Orientation:  Right  Patient position:  Trendelenburg  Catheter type:  Double lumen  Catheter size:  8.5 Fr  Catheter length:  12 cm  Number of attempts:  1  Successful placement: Yes      Assessment:   Post-procedure:  Catheter secured and sterile dressing applied  Assessment:  Blood return through all ports and guidewire removal verified  Insertion:  Uncomplicated  Patient tolerance:  Patient tolerated the procedure well with no immediate complications  In OR, prior to induction, anatomic relationship between R IJ and carotid A confirmed by U/S. After induction, using sterile technique, R IJ verified with \"finder\" needle. Styletted needle placed into R IJ. Guide wire easily passed int R IJ. Styletted needle removed. Double lumen introducer placed into R IJ over guidewire and sutured into place. Oximetric PA catheter floated with ease into the PA. Pt tolerated procedure w/o any obvious sequelae.

## 2021-04-19 NOTE — PROGRESS NOTES
TRANSFER - IN REPORT:    Verbal report received from [facility] CRNA(name) on [facility]  being received from [facility](unit) for routine progression of care      Report consisted of patients Situation, Background, Assessment and   Recommendations(SBAR). Information from the following report(s) SBAR, Kardex, OR Summary, Procedure Summary, Intake/Output, MAR, Recent Results, Med Rec Status and Cardiac Rhythm NSR was reviewed with the receiving nurse. Opportunity for questions and clarification was provided. Assessment completed upon patients arrival to unit and care assumed.

## 2021-04-19 NOTE — PROGRESS NOTES
Verbal bedside report received from [facility], [facility]. Assumed care of patient. Heparin IV drip verified at bedside with outgoing RN.

## 2021-04-20 ENCOUNTER — APPOINTMENT (OUTPATIENT)
Dept: GENERAL RADIOLOGY | Age: 56
DRG: 234 | End: 2021-04-20
Attending: PHYSICIAN ASSISTANT
Payer: MEDICARE

## 2021-04-20 PROBLEM — J98.11 ATELECTASIS, BILATERAL: Status: ACTIVE | Noted: 2021-04-20

## 2021-04-20 LAB
ANION GAP SERPL CALC-SCNC: 7 MMOL/L (ref 7–16)
ATRIAL RATE: 87 BPM
BASOPHILS # BLD: 0 K/UL (ref 0–0.2)
BASOPHILS NFR BLD: 0 % (ref 0–2)
BUN SERPL-MCNC: 13 MG/DL (ref 6–23)
CALCIUM SERPL-MCNC: 8.3 MG/DL (ref 8.3–10.4)
CALCULATED P AXIS, ECG09: 46 DEGREES
CALCULATED R AXIS, ECG10: 34 DEGREES
CALCULATED T AXIS, ECG11: 39 DEGREES
CHLORIDE SERPL-SCNC: 111 MMOL/L (ref 98–107)
CO2 SERPL-SCNC: 23 MMOL/L (ref 21–32)
CREAT SERPL-MCNC: 0.64 MG/DL (ref 0.8–1.5)
DIAGNOSIS, 93000: NORMAL
DIFFERENTIAL METHOD BLD: ABNORMAL
EOSINOPHIL # BLD: 0 K/UL (ref 0–0.8)
EOSINOPHIL NFR BLD: 0 % (ref 0.5–7.8)
ERYTHROCYTE [DISTWIDTH] IN BLOOD BY AUTOMATED COUNT: 13.2 % (ref 11.9–14.6)
GLUCOSE BLD STRIP.AUTO-MCNC: 103 MG/DL (ref 65–100)
GLUCOSE BLD STRIP.AUTO-MCNC: 108 MG/DL (ref 65–100)
GLUCOSE BLD STRIP.AUTO-MCNC: 111 MG/DL (ref 65–100)
GLUCOSE BLD STRIP.AUTO-MCNC: 114 MG/DL (ref 65–100)
GLUCOSE BLD STRIP.AUTO-MCNC: 118 MG/DL (ref 65–100)
GLUCOSE BLD STRIP.AUTO-MCNC: 121 MG/DL (ref 65–100)
GLUCOSE BLD STRIP.AUTO-MCNC: 90 MG/DL (ref 65–100)
GLUCOSE BLD STRIP.AUTO-MCNC: 91 MG/DL (ref 65–100)
GLUCOSE BLD STRIP.AUTO-MCNC: 97 MG/DL (ref 65–100)
GLUCOSE SERPL-MCNC: 108 MG/DL (ref 65–100)
HCT VFR BLD AUTO: 31.4 % (ref 41.1–50.3)
HGB BLD-MCNC: 10.2 G/DL (ref 13.6–17.2)
IMM GRANULOCYTES # BLD AUTO: 0 K/UL (ref 0–0.5)
IMM GRANULOCYTES NFR BLD AUTO: 0 % (ref 0–5)
LYMPHOCYTES # BLD: 1.4 K/UL (ref 0.5–4.6)
LYMPHOCYTES NFR BLD: 19 % (ref 13–44)
MAGNESIUM SERPL-MCNC: 2.5 MG/DL (ref 1.8–2.4)
MCH RBC QN AUTO: 29.2 PG (ref 26.1–32.9)
MCHC RBC AUTO-ENTMCNC: 32.5 G/DL (ref 31.4–35)
MCV RBC AUTO: 90 FL (ref 79.6–97.8)
MM INDURATION POC: 0 MM (ref 0–5)
MONOCYTES # BLD: 0.7 K/UL (ref 0.1–1.3)
MONOCYTES NFR BLD: 10 % (ref 4–12)
NEUTS SEG # BLD: 5.4 K/UL (ref 1.7–8.2)
NEUTS SEG NFR BLD: 71 % (ref 43–78)
NRBC # BLD: 0 K/UL (ref 0–0.2)
P-R INTERVAL, ECG05: 136 MS
PLATELET # BLD AUTO: 191 K/UL (ref 150–450)
PMV BLD AUTO: 8.8 FL (ref 9.4–12.3)
POTASSIUM SERPL-SCNC: 4.2 MMOL/L (ref 3.5–5.1)
PPD POC: NEGATIVE NEGATIVE
Q-T INTERVAL, ECG07: 458 MS
QRS DURATION, ECG06: 80 MS
QTC CALCULATION (BEZET), ECG08: 551 MS
RBC # BLD AUTO: 3.49 M/UL (ref 4.23–5.6)
SERVICE CMNT-IMP: ABNORMAL
SERVICE CMNT-IMP: NORMAL
SODIUM SERPL-SCNC: 141 MMOL/L (ref 138–145)
VENTRICULAR RATE, ECG03: 87 BPM
WBC # BLD AUTO: 7.6 K/UL (ref 4.3–11.1)

## 2021-04-20 PROCEDURE — 71045 X-RAY EXAM CHEST 1 VIEW: CPT

## 2021-04-20 PROCEDURE — 74011250637 HC RX REV CODE- 250/637: Performed by: PHYSICIAN ASSISTANT

## 2021-04-20 PROCEDURE — 97162 PT EVAL MOD COMPLEX 30 MIN: CPT

## 2021-04-20 PROCEDURE — 83735 ASSAY OF MAGNESIUM: CPT

## 2021-04-20 PROCEDURE — 82962 GLUCOSE BLOOD TEST: CPT

## 2021-04-20 PROCEDURE — 74011250636 HC RX REV CODE- 250/636: Performed by: THORACIC SURGERY (CARDIOTHORACIC VASCULAR SURGERY)

## 2021-04-20 PROCEDURE — 94761 N-INVAS EAR/PLS OXIMETRY MLT: CPT

## 2021-04-20 PROCEDURE — 74011000250 HC RX REV CODE- 250: Performed by: PHYSICIAN ASSISTANT

## 2021-04-20 PROCEDURE — 85025 COMPLETE CBC W/AUTO DIFF WBC: CPT

## 2021-04-20 PROCEDURE — 2709999900 HC NON-CHARGEABLE SUPPLY

## 2021-04-20 PROCEDURE — 97530 THERAPEUTIC ACTIVITIES: CPT

## 2021-04-20 PROCEDURE — 77030027138 HC INCENT SPIROMETER -A

## 2021-04-20 PROCEDURE — 77010033711 HC HIGH FLOW OXYGEN

## 2021-04-20 PROCEDURE — 74011250637 HC RX REV CODE- 250/637: Performed by: THORACIC SURGERY (CARDIOTHORACIC VASCULAR SURGERY)

## 2021-04-20 PROCEDURE — 74011250636 HC RX REV CODE- 250/636: Performed by: PHYSICIAN ASSISTANT

## 2021-04-20 PROCEDURE — 36600 WITHDRAWAL OF ARTERIAL BLOOD: CPT

## 2021-04-20 PROCEDURE — 93005 ELECTROCARDIOGRAM TRACING: CPT | Performed by: PHYSICIAN ASSISTANT

## 2021-04-20 PROCEDURE — 80048 BASIC METABOLIC PNL TOTAL CA: CPT

## 2021-04-20 PROCEDURE — 65660000004 HC RM CVT STEPDOWN

## 2021-04-20 PROCEDURE — 99233 SBSQ HOSP IP/OBS HIGH 50: CPT | Performed by: INTERNAL MEDICINE

## 2021-04-20 RX ORDER — FUROSEMIDE 40 MG/1
40 TABLET ORAL DAILY
Status: COMPLETED | OUTPATIENT
Start: 2021-04-21 | End: 2021-04-23

## 2021-04-20 RX ORDER — MAG HYDROX/ALUMINUM HYD/SIMETH 200-200-20
30 SUSPENSION, ORAL (FINAL DOSE FORM) ORAL
Status: DISCONTINUED | OUTPATIENT
Start: 2021-04-20 | End: 2021-04-23 | Stop reason: HOSPADM

## 2021-04-20 RX ORDER — POTASSIUM CHLORIDE 750 MG/1
10 TABLET, EXTENDED RELEASE ORAL DAILY
Status: COMPLETED | OUTPATIENT
Start: 2021-04-21 | End: 2021-04-23

## 2021-04-20 RX ORDER — OXYCODONE AND ACETAMINOPHEN 5; 325 MG/1; MG/1
1 TABLET ORAL
Status: DISCONTINUED | OUTPATIENT
Start: 2021-04-20 | End: 2021-04-23 | Stop reason: HOSPADM

## 2021-04-20 RX ORDER — AMIODARONE HYDROCHLORIDE 200 MG/1
200 TABLET ORAL EVERY 12 HOURS
Status: DISCONTINUED | OUTPATIENT
Start: 2021-04-20 | End: 2021-04-23 | Stop reason: HOSPADM

## 2021-04-20 RX ORDER — FAMOTIDINE 20 MG/1
20 TABLET, FILM COATED ORAL 2 TIMES DAILY
Status: DISCONTINUED | OUTPATIENT
Start: 2021-04-20 | End: 2021-04-23 | Stop reason: HOSPADM

## 2021-04-20 RX ORDER — INSULIN LISPRO 100 [IU]/ML
INJECTION, SOLUTION INTRAVENOUS; SUBCUTANEOUS
Status: DISCONTINUED | OUTPATIENT
Start: 2021-04-20 | End: 2021-04-21

## 2021-04-20 RX ORDER — TRAMADOL HYDROCHLORIDE 50 MG/1
50 TABLET ORAL
Status: DISCONTINUED | OUTPATIENT
Start: 2021-04-20 | End: 2021-04-23 | Stop reason: HOSPADM

## 2021-04-20 RX ORDER — POTASSIUM CHLORIDE 20 MEQ/1
40 TABLET, EXTENDED RELEASE ORAL
Status: DISCONTINUED | OUTPATIENT
Start: 2021-04-20 | End: 2021-04-23 | Stop reason: HOSPADM

## 2021-04-20 RX ORDER — LANOLIN ALCOHOL/MO/W.PET/CERES
400 CREAM (GRAM) TOPICAL
Status: DISCONTINUED | OUTPATIENT
Start: 2021-04-20 | End: 2021-04-23 | Stop reason: HOSPADM

## 2021-04-20 RX ORDER — AMOXICILLIN 250 MG
2 CAPSULE ORAL
Status: DISCONTINUED | OUTPATIENT
Start: 2021-04-20 | End: 2021-04-20

## 2021-04-20 RX ORDER — ONDANSETRON 2 MG/ML
4 INJECTION INTRAMUSCULAR; INTRAVENOUS
Status: DISCONTINUED | OUTPATIENT
Start: 2021-04-20 | End: 2021-04-23 | Stop reason: HOSPADM

## 2021-04-20 RX ORDER — AMOXICILLIN 250 MG
2 CAPSULE ORAL EVERY 12 HOURS
Status: DISCONTINUED | OUTPATIENT
Start: 2021-04-20 | End: 2021-04-23 | Stop reason: HOSPADM

## 2021-04-20 RX ORDER — ADHESIVE BANDAGE
30 BANDAGE TOPICAL DAILY PRN
Status: DISCONTINUED | OUTPATIENT
Start: 2021-04-20 | End: 2021-04-23 | Stop reason: HOSPADM

## 2021-04-20 RX ORDER — POTASSIUM CHLORIDE 20 MEQ/1
20 TABLET, EXTENDED RELEASE ORAL
Status: DISCONTINUED | OUTPATIENT
Start: 2021-04-20 | End: 2021-04-23 | Stop reason: HOSPADM

## 2021-04-20 RX ADMIN — FAMOTIDINE 20 MG: 10 INJECTION INTRAVENOUS at 08:58

## 2021-04-20 RX ADMIN — OXYCODONE HYDROCHLORIDE AND ACETAMINOPHEN 1 TABLET: 10; 325 TABLET ORAL at 10:35

## 2021-04-20 RX ADMIN — Medication 1 AMPULE: at 21:18

## 2021-04-20 RX ADMIN — TRAMADOL HYDROCHLORIDE 50 MG: 50 TABLET, FILM COATED ORAL at 19:23

## 2021-04-20 RX ADMIN — SENNOSIDES AND DOCUSATE SODIUM 2 TABLET: 8.6; 5 TABLET ORAL at 21:18

## 2021-04-20 RX ADMIN — ATORVASTATIN CALCIUM 80 MG: 80 TABLET, FILM COATED ORAL at 21:18

## 2021-04-20 RX ADMIN — KETOROLAC TROMETHAMINE 15 MG: 15 INJECTION, SOLUTION INTRAMUSCULAR; INTRAVENOUS at 15:27

## 2021-04-20 RX ADMIN — ASPIRIN 81 MG: 81 TABLET ORAL at 08:58

## 2021-04-20 RX ADMIN — Medication 10 ML: at 14:00

## 2021-04-20 RX ADMIN — CEFAZOLIN 2 G: 10 INJECTION, POWDER, FOR SOLUTION INTRAVENOUS at 03:13

## 2021-04-20 RX ADMIN — CARVEDILOL 6.25 MG: 6.25 TABLET, FILM COATED ORAL at 21:16

## 2021-04-20 RX ADMIN — OXYCODONE HYDROCHLORIDE AND ACETAMINOPHEN 1 TABLET: 10; 325 TABLET ORAL at 01:17

## 2021-04-20 RX ADMIN — CARVEDILOL 6.25 MG: 6.25 TABLET, FILM COATED ORAL at 08:58

## 2021-04-20 RX ADMIN — AMIODARONE HYDROCHLORIDE 200 MG: 200 TABLET ORAL at 08:58

## 2021-04-20 RX ADMIN — Medication 10 ML: at 05:28

## 2021-04-20 RX ADMIN — KETOROLAC TROMETHAMINE 15 MG: 15 INJECTION, SOLUTION INTRAMUSCULAR; INTRAVENOUS at 06:59

## 2021-04-20 RX ADMIN — OXYCODONE HYDROCHLORIDE AND ACETAMINOPHEN 1 TABLET: 10; 325 TABLET ORAL at 05:52

## 2021-04-20 RX ADMIN — AMIODARONE HYDROCHLORIDE 200 MG: 200 TABLET ORAL at 21:19

## 2021-04-20 RX ADMIN — Medication 10 ML: at 19:25

## 2021-04-20 RX ADMIN — OXYCODONE HYDROCHLORIDE AND ACETAMINOPHEN 1 TABLET: 10; 325 TABLET ORAL at 17:03

## 2021-04-20 RX ADMIN — Medication 1 AMPULE: at 08:58

## 2021-04-20 RX ADMIN — OXYCODONE HYDROCHLORIDE AND ACETAMINOPHEN 1 TABLET: 10; 325 TABLET ORAL at 21:19

## 2021-04-20 RX ADMIN — FAMOTIDINE 20 MG: 20 TABLET, FILM COATED ORAL at 17:03

## 2021-04-20 NOTE — OP NOTES
[address] [facility]  OPERATIVE REPORT    Name:  Jey Calderon  MR#:  [id number]  :  1965  ACCOUNT #:  [de-identified]  DATE OF SERVICE:  2021    PREOPERATIVE DIAGNOSIS:  Coronary artery occlusive disease. POSTOPERATIVE DIAGNOSIS:  Coronary artery occlusive disease. PROCEDURE PERFORMED:  Redo sternotomy, three-vessel coronary artery bypass grafting using reverse saphenous vein graft to the posterior descending coronary artery, a sequential saphenous vein graft to the diagonal and the obtuse marginal coronary; endoscopic vein harvesting; (arterial line and [place]-Teagan catheter placed by Anesthesia); temporary right ventricular pacing wires. SURGEON:  Brian Tabares MD    ASSISTANT:       ANESTHESIA:  General.    COMPLICATIONS:   .    SPECIMENS REMOVED:   .    IMPLANTS:   .    ESTIMATED BLOOD LOSS:  Minimal.    CARDIOPULMONARY BYPASS TIME:  103 minutes. AORTIC CROSSCLAMP TIME:  77 minutes. PREOPERATIVE HISTORY:  This is a 79-year-old gentleman who had previous minimally invasive OPCAB surgery 25 years ago. The patient had exertional chest pain. Repeat catheterization showed patency of the internal mammary artery to the left anterior descending coronary through the minimally invasive incision but he now had disease in the circumflex and right coronary artery. Redo surgery was recommended. WHAT WAS FOUND/WHAT WAS DONE:  The chest was exposed through a median sternotomy this time. Adhesions were moderate. The internal mammary artery had been incompletely taken down off the chest wall previously in the OPCAB procedure. We then carefully took down the proximal internal mammary artery toward its exit from the subclavian. The patient was placed on bypass and cooled to 32 degrees centigrade.   After adhesions were taken down, we placed an individual vein graft to the posterior descending coronary artery, then a sequential graft was placed to the diagonal coronary and then onto the obtuse marginal coronary of the circumflex. The patient came off bypass without trouble. PROCEDURE:  The patient was premedicated and brought to the operating room. The patient was placed supine on the table and appropriate monitoring lines were placed including a [place]-Teagan catheter, radial artery catheter. General endotracheal anesthesia was given. The anterior body and both legs then prepped with Betadine. The patient draped into a sterile field. Endoscopic vein harvesting was taken removing the saphenous vein from the left leg from the groin area toward below the knee. The vein was prepared and the skin of the leg was closed with subcuticular closure technique. The chest was opened in the midline. The chest was opened in the midline. The sternum was opened in the midline with an oscillating saw, again to make certain there were no retrosternal adhesions. Using the Rultract, we carefully elevated the left hemisternum and we took down the proximal end of the mammary artery and then detached the adhesions from the chest wall. The sternum was then retracted. Heparin at 300 units per kg was given. The patient was deemed cannulated and placed on bypass, cooled to 32 degrees centigrade. While on bypass, we then were able to complete adhesion take down to expose the circumflex vessels. Aorta was then crossclamped. Blood cardioplegia was given antegrade. The circumflex marginal coronary artery was identified, opened for a 5-mm length. Reverse vein was sutured to this vessel with 7-0 Prolene. Next, the diagonal coronary artery was identified, opened for a 5-mm length and using the same vein segment, a side-to-side anastomosis was carried out with 7-0 Prolene. Further cardioplegia was given. Next, posterior descending coronary artery was identified, opened and a second reverse vein was sutured to this vessel with 7-0 Prolene.   Next, two 4-mm buttons of aortic wall were removed and proximal ends of both vein grafts were sutured to the aorta with 7-0 Prolene. It should be stated that the patent mammary artery had been identified and it was temporarily occluded with a Bulldog during the crossclamping. With the crossclamp released and the Bulldogs were taken from the patent mammary artery, the patient was rewarmed to 37 degrees centigrade. He was weaned from bypass without difficulty. All blood was then returned to the patient after which cannulas were removed and the pursestring sutures tied. Protamine was then given to reverse the heparin. Temporary right ventricular pacing wires were placed. A 32-[place] tube was left to drain the left chest cavity as well as the mediastinum. Hemostasis was satisfactory. Sternum was then approximated with interrupted stainless steel wire. Soft tissue was closed with Vicryl and the skin was closed with Vicryl using subcuticular closure technique. The patient tolerated the procedure well, was moved to the intensive care in stable condition. Sponge, needle, and instrument counts reported as correct.       Tena Henriquez MD      [id number]  D:  04/20/2021 16:42  T:  04/20/2021 17:38  JOB #:  [id number]

## 2021-04-20 NOTE — PROGRESS NOTES
Bedside and verbal report received from [facility], [address] [facility].

## 2021-04-20 NOTE — PROGRESS NOTES
Pt's left radial art line removed at this time. Manual pressure held until hemostasis achieved. No bleeding or hematoma at site. Pressure dressing to site. Will monitor. Pt's [place] removed at this time. Pt given instructions for [place] pull and Pt v/u. [place] removed without difficulty, no ectopy seen on monitor. Line hub capped. Pt tolerated procedure well. No acute distress noted. VSS throughout. Will continue to monitor.

## 2021-04-20 NOTE — PROGRESS NOTES
CV Progress Note    Admit Date: 4/16/2021    POD 1    Subjective:     Patient present conditions: Awake, Alert and Cooperative. Review of Systems   Cardiac: Vital signs stable. Lines out  Respiratory: Chest x-ray clear. Minimal chest tube drainage. extubated  Neuro: Moves all four extremities. Incision: Dry. GI: Taking liquids. Objective:     Vitals:  Blood pressure (!) 104/58, pulse 87, temperature 98.9 °F (37.2 °C), resp. rate 24, height 6' (1.829 m), weight 243 lb 9.7 oz (110.5 kg), SpO2 94 %. I/O:  No intake/output data recorded. 04/18 1901 - 04/20 0700  In: 4597.7 [P.O.:120; I.V.:4127.7]  Out: 3305 [Urine:2975]    Heart: No Murmur. Lung: Working with IS. Neuro: Cooperative. Incisions: Dry.     ECG/Telemetry: Unchanged from Pre-Op    Labs:  Recent Results (from the past 12 hour(s))   GLUCOSE, POC    Collection Time: 04/19/21  8:16 PM   Result Value Ref Range    Glucose (POC) 116 (H) 65 - 100 mg/dL    Performed by [employer]whip[facility]RAN    GLUCOSE, POC    Collection Time: 04/19/21  9:18 PM   Result Value Ref Range    Glucose (POC) 112 (H) 65 - 100 mg/dL    Performed by AppNetaESVIN    HGB & HCT    Collection Time: 04/19/21  9:20 PM   Result Value Ref Range    HGB 10.5 (L) 13.6 - 17.2 g/dL    HCT 31.7 (L) 41.1 - 50.3 %   MAGNESIUM    Collection Time: 04/19/21  9:20 PM   Result Value Ref Range    Magnesium 2.6 (H) 1.8 - 2.4 mg/dL   POTASSIUM    Collection Time: 04/19/21  9:20 PM   Result Value Ref Range    Potassium 4.4 3.5 - 5.1 mmol/L   GLUCOSE, POC    Collection Time: 04/19/21 11:08 PM   Result Value Ref Range    Glucose (POC) 116 (H) 65 - 100 mg/dL    Performed by [employer]whipSIMRAN    GLUCOSE, POC    Collection Time: 04/20/21 12:08 AM   Result Value Ref Range    Glucose (POC) 114 (H) 65 - 100 mg/dL    Performed by [facility]    GLUCOSE, POC    Collection Time: 04/20/21  1:13 AM   Result Value Ref Range    Glucose (POC) 121 (H) 65 - 100 mg/dL    Performed by [facility]    GLUCOSE, POC Collection Time: 04/20/21  2:08 AM   Result Value Ref Range    Glucose (POC) 111 (H) 65 - 100 mg/dL    Performed by [facility]    GLUCOSE, POC    Collection Time: 04/20/21  3:08 AM   Result Value Ref Range    Glucose (POC) 103 (H) 65 - 100 mg/dL    Performed by [facility]    CBC WITH AUTOMATED DIFF    Collection Time: 04/20/21  3:09 AM   Result Value Ref Range    WBC 7.6 4.3 - 11.1 K/uL    RBC 3.49 (L) 4.23 - 5.6 M/uL    HGB 10.2 (L) 13.6 - 17.2 g/dL    HCT 31.4 (L) 41.1 - 50.3 %    MCV 90.0 79.6 - 97.8 FL    MCH 29.2 26.1 - 32.9 PG    MCHC 32.5 31.4 - 35.0 g/dL    RDW 13.2 11.9 - 14.6 %    PLATELET 123 116 - 663 K/uL    MPV 8.8 (L) 9.4 - 12.3 FL    ABSOLUTE NRBC 0.00 0.0 - 0.2 K/uL    DF AUTOMATED      NEUTROPHILS 71 43 - 78 %    LYMPHOCYTES 19 13 - 44 %    MONOCYTES 10 4.0 - 12.0 %    EOSINOPHILS 0 (L) 0.5 - 7.8 %    BASOPHILS 0 0.0 - 2.0 %    IMMATURE GRANULOCYTES 0 0.0 - 5.0 %    ABS. NEUTROPHILS 5.4 1.7 - 8.2 K/UL    ABS. LYMPHOCYTES 1.4 0.5 - 4.6 K/UL    ABS. MONOCYTES 0.7 0.1 - 1.3 K/UL    ABS. EOSINOPHILS 0.0 0.0 - 0.8 K/UL    ABS. BASOPHILS 0.0 0.0 - 0.2 K/UL    ABS. IMM.  GRANS. 0.0 0.0 - 0.5 K/UL   METABOLIC PANEL, BASIC    Collection Time: 04/20/21  3:09 AM   Result Value Ref Range    Sodium 141 138 - 145 mmol/L    Potassium 4.2 3.5 - 5.1 mmol/L    Chloride 111 (H) 98 - 107 mmol/L    CO2 23 21 - 32 mmol/L    Anion gap 7 7 - 16 mmol/L    Glucose 108 (H) 65 - 100 mg/dL    BUN 13 6 - 23 MG/DL    Creatinine 0.64 (L) 0.8 - 1.5 MG/DL    GFR est AA >60 >60 ml/min/1.73m2    GFR est non-AA >60 >60 ml/min/1.73m2    Calcium 8.3 8.3 - 10.4 MG/DL   MAGNESIUM    Collection Time: 04/20/21  3:09 AM   Result Value Ref Range    Magnesium 2.5 (H) 1.8 - 2.4 mg/dL   GLUCOSE, POC    Collection Time: 04/20/21  4:59 AM   Result Value Ref Range    Glucose (POC) 90 65 - 100 mg/dL    Performed by Anuradha    GLUCOSE, POC    Collection Time: 04/20/21  6:01 AM   Result Value Ref Range    Glucose (POC) 91 65 - 100 mg/dL    Performed by tinycluesSIMRAN    GLUCOSE, POC    Collection Time: 04/20/21  7:19 AM   Result Value Ref Range    Glucose (POC) 97 65 - 100 mg/dL    Performed by CasengoRN    EKG, 12 LEAD, SUBSEQUENT    Collection Time: 04/20/21  7:47 AM   Result Value Ref Range    Ventricular Rate 87 BPM    Atrial Rate 87 BPM    P-R Interval 136 ms    QRS Duration 80 ms    Q-T Interval 458 ms    QTC Calculation (Bezet) 551 ms    Calculated P Axis 46 degrees    Calculated R Axis 34 degrees    Calculated T Axis 39 degrees    Diagnosis       Normal sinus rhythm  Nonspecific T wave abnormality  Abnormal ECG  When compared with ECG of 19-APR-2021 13:48,  Right bundle branch block is no longer Present         Assessment:     Stable. Plan transfer today      Plan/Recommendations/Medical Decision Making:     Continue present treatment    Discontinue: Chest tubes today. See orders    Liz Foley.  Marijo Halsted, MD

## 2021-04-20 NOTE — ADT AUTH CERT NOTES
Comments Comment Last edited by  on  at Patient Demographics Patient Name Sriram Moe [facility]  
[id number] Sex Male   
1965 Address [address] [facility] [address] Phone [phone] (Home) *Preferred*  
[phone] [facility]) Patient Demographics Patient Name Sriram Moe [facility]  
[id number] Sex Male   
1965 Address [address] [facility] [address] Phone [phone] (Home) *Preferred*  
[phone] [facility]) CSN:  
[id number] Admit Date: Admit Time Room Bed 2021 10:15  [77668] 01 [755] Attending Providers Provider Pager From To  
Keven Sauceda MD  21 Emergency Contact(s) Name Relation Home Work Mobile [place] Thayer [phone] Natasha Palomino [phone] Utilization Reviews 
 
  
Coronary Artery Bypass Graft (CABG) - Care Day 1 (2021) by Radha Elizabeth RN 
 
  
Review Entered Review Status 2021 15:04 Completed  
  
Criteria Review Care Day: 1 Care Date: 2021 Level of Care:   
Guideline Day 1 Level Of Care   
(X) OR to ICU Clinical Status   
(X) * Clinical Indications met [J]   
(X) Intubated [K] Activity   
(X) Bed rest with head of bed elevated 40 degrees Routes (X) IV fluids, medications 2021 15:04:51 EDT by March Chain   
  DRIPS FOR PROCEDURE - ANCEF 2G IV X 2, PEPCID 20 MG BID IV Interventions (X) Chest tube (X) Central lines   
(X) Oxygen 2021 15:04:51 EDT by Joyce Rajput Medications (X) Possible aspirin 2021 15:04:51 EDT by March Chain   
  ASA 81 MG QD PO   
(X) Beta-blocker [L] 2021 15:04:51 EDT by March Chain   
  COREG 6.25 MG BID PO   
(X) Statin 2021 15:04:51 EDT by March Chain   
  LIPITOR 80 MG QD PO   
* Milestone Additional Notes Brief Postoperative Note  
   
Patient: Elisabeth Banks YOB: 1965 MRN: [id number]  
   
Date of Procedure: 4/19/2021   
   
Pre-Op Diagnosis: Atherosclerosis of native coronary artery of native heart with unstable angina pectoris ([facility] [address] [I25.110]  
  Post-Op Diagnosis: Same as preoperative diagnosis.    
   
Procedure(s): CORONARY ARTERY BYPASS GRAFT (CABG X3) sequential SVG to DIAG and to OM, SVG to the PDA STERNOTOMY REDO  
   
Anesthesia: General   
   
Estimated Blood Loss (mL): Minimal  
   
  
  
Myocardial Infarction - Care Day 2 (4/17/2021) by Mayank Harrison RN 
 
  
Review Entered Review Status 4/19/2021 15:00 Completed  
  
Criteria Review Care Day: 2 Care Date: 4/17/2021 Level of Care: ICU Guideline Day 2 Level Of Care ( ) Intermediate care or telemetry 4/19/2021 15:00:03 EDT by Jennifer Wei   
  ICU Clinical Status   
(X) * Hemodynamic stability 4/19/2021 15:00:03 EDT by Jennifer Wei   
  BP 94/58, TEMP 98.5, HR 78, RR 18, O2 95 ON RA   
( ) * Chest pain, dyspnea, or anginal equivalent absent Activity ( ) Activity as tolerated 4/19/2021 15:00:03 EDT by Megan Asher ( ) * Oral hydration, medications (X) Parenteral medications 4/19/2021 15:00:03 EDT by Jennifer Wei   
  NORCO 7.5 PO X 2, MORPHINE 2 MG IV X 1 Interventions   
(X) * Oxygen absent   
(X) Possible noninvasive diagnostic testing (eg, stress test) [L]   
(X) Possible cardiac angiography, with PCI if indicated [M] Medications ( ) * IV nitroglycerin absent 4/19/2021 15:00:03 EDT by Santosh Underwood   
(X) Anticoagulants 4/19/2021 15:00:03 EDT by Jennifer Wei   
  HEPARIN 2000U IV X 1, HEPARIN 4000U IV X 1, HEPARIN DRIP   
(X) Antiplatelet agents (eg, aspirin, clopidogrel, ticagrelor) 4/19/2021 15:00:03 EDT by Jennifer Wei   
  ASA 81 MG QD PO   
(X) Beta-blocker 4/19/2021 15:00:03 EDT by Jennifer Wei   
  COREG 6.25 MG BID PO   
(X) Statin 4/19/2021 15:00:03 EDT by Jennifer Wei   
  LIPITOR 80 MG QD PO * Milestone Additional Notes CARD NOTE No cp or sob  
   
Physical Exam:  
General-No Acute Distress Neck- supple, no JVD  
CV- regular rate and rhythm no MRG Lung- clear bilaterally Abd- soft, nontender, nondistended Ext- no edema bilaterally. Skin- warm and dry  
   
Doing well. Stable. Inc coreg 6.25  
   
   
  
SURG NOTE Continue present treatment  
   
Plan for redo CABG Monday. Risks complications and alternatives discussed,  
Preop ordres are in  
   
  
  
HDL 29 CXR: 1. Cardiomegaly. 2. No evidence of pneumonia or pulmonary edema CAROTID US: NORMAL BEDREST, ELEV HOB, DAILY WGT, STRICT I/O

## 2021-04-20 NOTE — PROGRESS NOTES
PT note:  Treatment deferred as the patient states that he just can't participate right now. Desires for this writer to return later. Will return as time permits.   Dipak Palencia, PTA

## 2021-04-20 NOTE — PROGRESS NOTES
Verbal bedside report given to Inderjit Cruz oncoming RN. Patient's situation, background, assessment and recommendations provided. Opportunity for questions provided. Oncoming RN assumed care of patient.

## 2021-04-20 NOTE — PROGRESS NOTES
Care Management Interventions  PCP Verified by CM: Yes  Mode of Transport at Discharge: Other (see comment)(family)  Transition of Care Consult (CM Consult): Discharge Planning, Home Health  Discharge Durable Medical Equipment: No  Physical Therapy Consult: Yes  Occupational Therapy Consult: No  Speech Therapy Consult: No  Current Support Network: Own Home, Lives Alone  Confirm Follow Up Transport: Family  The Plan for Transition of Care is Related to the Following Treatment Goals : home with home health   The Patient and/or Patient Representative was Provided with a Choice of Provider and Agrees with the Discharge Plan?: Yes  Name of the Patient Representative Who was Provided with a Choice of Provider and Agrees with the Discharge Plan: Mr. Inderjit Rivera of Choice List was Provided with Basic Dialogue that Supports the Patient's Individualized Plan of Care/Goals, Treatment Preferences and Shares the Quality Data Associated with the Providers?: Yes   Resource Information Provided?: No  Discharge Location  Discharge Placement: Home with home health      This CM met with pt this day to complete assessment. Pt verified his PCP, insurance, emergency contact, and home address. He reports no difficulty obtaining his medications in the community. He lives at home alone with no steps to enter. His home DME includes a walker and he confirms his sister lives next door. He confirms that at baseline prior to admission he is independent with his ADLs including bathing, dressing, cooking, and driving. We discussed discharge planning this day. Pt plans on returning home with support from his sister. We discussed the role and recommendation of home health at discharge - he is agreeable to referral.  Reviewed home health agencies - he is agreeable to referral to [facility]. No additional CM needs at this time. Will continue to follow and update as needed.

## 2021-04-20 NOTE — PROGRESS NOTES
TRANSFER - OUT REPORT:    Verbal report given to ESVIN heart on Elena Martinez  being transferred to [facility] room 2233 for routine progression of care       Report consisted of patients Situation, Background, Assessment and Recommendations(SBAR). Information from the following report(s) SBAR, Procedure Summary, MAR and Cardiac Rhythm NSR was reviewed with the receiving nurse. Opportunity for questions and clarification was provided.

## 2021-04-20 NOTE — PROGRESS NOTES
TRANSFER - IN REPORT:    Verbal report received from [address], RN on Apoorva Hdez  being received from [facility] for routine progression of care      Report consisted of patients Situation, Background, Assessment and   Recommendations(SBAR). Information from the following report(s) SBAR was reviewed with the receiving nurse. Opportunity for questions and clarification was provided. Assessment completed upon patients arrival to unit and care assumed. Dual assessment completed with RN. Dressing on MS and LLE c/d/i. Sacrum intact, no redness or breakdown noted. No skin breakdown noted.

## 2021-04-20 NOTE — PROGRESS NOTES
ACUTE PHYSICAL THERAPY GOALS:  (Developed with and agreed upon by patient and/or caregiver.)  1. Mr. Faustino Santillan will perform supine to sit and sit to supine independently in 7 days. 2.  Mr. Faustino Santillan will perform sit to stand and bed to chair independently in 7 days. 3.  Mr. Faustino Santillan will perform gait >1000 ft independently in 7 days. 4.  Mr. Faustino Santillan will go up and down 3 steps with rail independently in 7 days. 5.  Mr. Faustino Santillan will perform therex x 25 reps in sitting and standing in 7 days. PHYSICAL THERAPY ASSESSMENT: Initial Assessment, Daily Note and AM PT Treatment Day # 1      Lauryn Weiss is a 61 y.o. male   PRIMARY DIAGNOSIS: STEMI (ST elevation myocardial infarction) ([facility] [address]  STEMI (ST elevation myocardial infarction) ([facility] [address] [I21.3]  Procedure(s) (LRB):  CORONARY ARTERY BYPASS GRAFT (CABG X3) (N/A)  STERNOTOMY REDO (N/A)  1 Day Post-Op  Reason for Referral:    ICD-10: Treatment Diagnosis: Generalized Muscle Weakness (M62.81)  Difficulty in walking, Not elsewhere classified (R26.2)  INPATIENT: Payor: [employer] MEDICARE / Plan: [facility] [employer] MEDICARE HMO / Product Type: Managed Care Medicare /     ASSESSMENT:     REHAB RECOMMENDATIONS:   Recommendation to date pending progress:  [address] [place] [address]  Equipment:    To Be Determined     PRIOR LEVEL OF FUNCTION:  (Prior to Hospitalization) INITIAL/CURRENT LEVEL OF FUNCTION:  (Most Recently Demonstrated)   Bed Mobility:   Independent  Sit to Stand:   Independent  Transfers:   Independent  Gait/Mobility:   Independent Bed Mobility:   Not tested  Sit to Stand:  [employer] Assistance  Transfers:   Contact Guard Assistance  Gait/Mobility:   Contact Guard Assistance with rollator     ASSESSMENT:  Mr. Faustino Santillan with a significant past medical history of cardiac issues including a cabg at age 38 and 4 stents. Today he has done well with mobility with main complaints being catheter and CT discomfort.   Overall cg provided for all aspects of mobility and gait using rollator and 3 liters O2. Mr. Bert Vallejo will do quite well moving forward. He is functioning below baseline and is therefore appropriate for skilled PT to maximize his rehab potential.  Chest tube and catheter should come out today and he will be transferred to step down unit. Sternal precautions reviewed. SUBJECTIVE:   Mr. Bert Vallejo states, \"Ill feel better when these tubes come out. \"    SOCIAL HISTORY/LIVING ENVIRONMENT: sister will be staying with him  Home Environment: Private residence  One/Two Story Residence: Two story  Living Alone: Yes  Support Systems: Family member(s)  OBJECTIVE:     PAIN: VITAL SIGNS: LINES/DRAINS:   Pre Treatment:   5  Post Treatment: 5  95 Chest Tube, Godoy Catheter and IV  O2 Device: Nasal cannula     GROSS EVALUATION:   Within Functional Limits Abnormal/ Functional Abnormal/ Non-Functional (see comments) Not Tested Comments:   AROM [x] [] [] []    PROM [] [] [] []    Strength [x] [] [] []    Balance [] [] [] []    Posture [] [] [] []    Sensation [x] [] [] []    Coordination [] [] [] []    Tone [] [] [] []    Edema [] [] [] []    Activity Tolerance [] [] [] []     [] [] [] []      COGNITION/  PERCEPTION: Intact Impaired   (see comments) Comments:   Orientation [x] []    Vision [x] []    Hearing [x] []    Command Following [x] []    Safety Awareness [x] []     [] []      MOBILITY: I Mod I S SBA CGA Min Mod Max Total  NT x2 Comments:   Bed Mobility    Rolling [] [] [] [] [] [] [] [] [] [x] []    Supine to Sit [] [] [] [] [] [] [] [] [] [x] []    Scooting [] [] [] [] [] [] [] [] [] [x] []    Sit to Supine [] [] [] [] [] [] [] [] [] [x] []    Transfers    Sit to Stand [] [] [] [] [x] [] [] [] [] [] []    Bed to Chair [] [] [] [] [x] [] [] [] [] [] []    Stand to Sit [] [] [] [] [x] [] [] [] [] [] []    I=Independent, Mod I=Modified Independent, S=Supervision, SBA=Standby Assistance, CGA=Contact Guard Assistance,   Min=Minimal Assistance, Mod=Moderate Assistance, Max=Maximal Assistance, Total=Total Assistance, NT=Not Tested  GAIT: I Mod I S SBA CGA Min Mod Max Total  NT x2 Comments:   Level of Assistance [] [] [] [] [x] [] [] [] [] [] []    Distance 160    DME Rolling Walker    Gait Quality     Weightbearing Status N/A     I=Independent, Mod I=Modified Independent, S=Supervision, SBA=Standby Assistance, CGA=Contact Guard Assistance,   Min=Minimal Assistance, Mod=Moderate Assistance, Max=Maximal Assistance, Total=Total Assistance, NT=Not [facility]       How much difficulty does the patient currently have. .. Unable A Lot A Little None   1. Turning over in bed (including adjusting bedclothes, sheets and blankets)? [] 1   [x] 2   [] 3   [] 4   2. Sitting down on and standing up from a chair with arms ( e.g., wheelchair, bedside commode, etc.)   [] 1   [] 2   [x] 3   [] 4   3. Moving from lying on back to sitting on the side of the bed? [] 1   [x] 2   [] 3   [] 4   How much help from another person does the patient currently need. .. Total A Lot A Little None   4. Moving to and from a bed to a chair (including a wheelchair)? [] 1   [] 2   [x] 3   [] 4   5. Need to walk in hospital room? [] 1   [] 2   [x] 3   [] 4   6. Climbing 3-5 steps with a railing? [] 1   [] 2   [x] 3   [] 4   © 2007, Trustees of [address] [place] [address] [address] [address], under license to [employer]. All rights reserved     Score:  Initial: 16 Most Recent: X (Date: -- )    Interpretation of Tool:  Represents activities that are increasingly more difficult (i.e. Bed mobility, Transfers, Gait). PLAN:   FREQUENCY/DURATION: PT Plan of Care: BID for duration of hospital stay or until stated goals are met, whichever comes first.    PROBLEM LIST:   (Skilled intervention is medically necessary to address:)  1. Decreased Activity Tolerance  2. Decreased AROM/PROM  3. Decreased Gait Ability  4. Decreased Strength  5.  Decreased Transfer Abilities   INTERVENTIONS PLANNED:   (Benefits and precautions of physical therapy have been discussed with the patient.)  1. Therapeutic Activity  2. Therapeutic Exercise/HEP  3. Neuromuscular Re-education  4. Gait Training  5. Education     TREATMENT:     EVALUATION: Moderate Complexity : (Untimed Charge)    TREATMENT:   ($$ Therapeutic Activity: 8-22 mins    )  Therapeutic Activity (15 Minutes): Therapeutic activity included Transfer Training, Ambulation on level ground, Sitting balance  and Standing balance to improve functional Mobility and Activity tolerance.     TREATMENT GRID:  N/A    AFTER TREATMENT POSITION/PRECAUTIONS:  Chair, Needs within reach and RN notified    INTERDISCIPLINARY COLLABORATION:  RN/PCT and PT/PTA    TOTAL TREATMENT DURATION:  PT Patient Time In/Time Out  Time In: 0845  Time Out: [address] [facility],

## 2021-04-20 NOTE — PROGRESS NOTES
Critical Care Daily Progress Note: 4/20/2021    Annalee Meigs   Admission Date: 4/16/2021         The patient's chart is reviewed and the patient is discussed with the staff. Patient is seen at the request of Dr. Matteo Carlson for respiratory management status post cardiac surgery. Patient had CAD s/p CABG in 1995, HTN, ICM, chronic systolic CHF, chronic pain, and GERD. Pt [facility] to [facility] on 4/16/21 with chest pain. His EKG revealed inferior STEMI and pt transferred to [facility] for emergent LHC. Pt was found to have MVCAD. Pt was seen by CT surgery and deemed appropriate for redo sternotomy. Currently is sedated in CV-ICU and orally intubated receiving  mechanical ventilation.     We have been asked to see in the CV-ICU for mechanical ventilation management and weaning.        Subjective:     He is extubated last night  Currently he is on OptiFlow at 40% FiO2 and 40 L/min flow  Currently he is complaining of chest pain at the surgical site  Remain on insulin drip      Current Facility-Administered Medications   Medication Dose Route Frequency    benzocaine-menthol (CEPACOL) lozenge  1 Lozenge Oral Q2H PRN    0.9% sodium chloride infusion 250 mL  250 mL IntraVENous PRN    0.9% sodium chloride infusion  25 mL/hr IntraVENous CONTINUOUS    dextrose 5% - 0.45% NaCl with KCl 20 mEq/L infusion  25 mL/hr IntraVENous CONTINUOUS    sodium chloride (NS) flush 5-40 mL  5-40 mL IntraVENous Q8H    sodium chloride (NS) flush 5-40 mL  5-40 mL IntraVENous PRN    acetaminophen (TYLENOL) tablet 650 mg  650 mg Oral Q4H PRN    oxyCODONE-acetaminophen (PERCOCET) 5-325 mg per tablet 1 Tab  1 Tab Oral Q4H PRN    morphine injection 3-5 mg  3-5 mg IntraVENous Q1H PRN    naloxone (NARCAN) injection 0.4 mg  0.4 mg IntraVENous PRN    DOBUTamine (DOBUTREX) 500 mg/250 mL (2,000 mcg/mL) infusion  0-10 mcg/kg/min IntraVENous TITRATE    EPINEPHrine (ADRENALIN) 4 mg in 0.9% sodium chloride 250 mL infusion  1-10 mcg/min IntraVENous TITRATE    nitroglycerin (Tridil) 200 mcg/ml infusion  0-20 mcg/min IntraVENous TITRATE    PHENYLephrine (MAICOL-SYNEPHRINE) 30 mg in 0.9% sodium chloride 250 mL infusion   mcg/min IntraVENous TITRATE    NOREPINephrine (LEVOPHED) 4 mg in 5% dextrose 250 mL infusion  0.01-0.5 mcg/kg/min IntraVENous TITRATE    amiodarone (CORDARONE) tablet 200 mg  200 mg Oral BID    ondansetron (ZOFRAN) injection 4-8 mg  4-8 mg IntraVENous Q4H PRN    insulin regular (NOVOLIN R, HUMULIN R) 100 Units in 0.9% sodium chloride 100 mL infusion  1 Units/hr IntraVENous TITRATE    dextrose (D50W) injection syrg 12.5 g  25 mL IntraVENous PRN    magnesium sulfate 1 g/100 ml IVPB (premix or compounded)  1 g IntraVENous PRN    potassium chloride 10 mEq in 50 ml IVPB  10 mEq IntraVENous PRN    midazolam (VERSED) injection 1 mg  1 mg IntraVENous Q1H PRN    tuberculin injection 5 Units  5 Units IntraDERMal ONCE    famotidine (PF) (PEPCID) 20 mg in 0.9% sodium chloride 10 mL injection  20 mg IntraVENous Q12H    dexmedeTOMidine in 0.9 % NaCl (PRECEDEX) 400 mcg/100 mL (4 mcg/mL) infusion soln  0.1-1.5 mcg/kg/hr IntraVENous TITRATE    lip protectant (BLISTEX) ointment 1 Each  1 Each Topical PRN    ketorolac (TORADOL) injection 15 mg  15 mg IntraVENous Q6H PRN    carvediloL (COREG) tablet 6.25 mg  6.25 mg Oral Q12H    atorvastatin (LIPITOR) tablet 80 mg  80 mg Oral QHS    oxyCODONE-acetaminophen (PERCOCET 10)  mg per tablet 1 Tab  1 Tab Oral Q4H PRN    alcohol 62% (NOZIN) nasal  1 Ampule  1 Ampule Topical Q12H    heparin (PF) 2 units/ml in NS infusion  2 Units/hr IntraarTERial CONTINUOUS    aspirin delayed-release tablet 81 mg  81 mg Oral DAILY    [Held by provider] losartan (COZAAR) tablet 25 mg  25 mg Oral DAILY    nitroglycerin (Tridil) 200 mcg/ml infusion  0-200 mcg/min IntraVENous TITRATE    sodium chloride (NS) flush 5-40 mL  5-40 mL IntraVENous PRN    promethazine (PHENERGAN) with saline injection 12.5 mg 12.5 mg IntraVENous Q6H PRN       Review of Systems        Constitutional:  negative for fever, chills, sweats  Cardiovascular: Negative palpitations, syncope, edema, positive surgical pain  Gastrointestinal:  negative for dysphagia, reflux, vomiting, diarrhea, abdominal pain, or melena  Neurologic:  negative for focal weakness, numbness, headache      Objective:     Vitals:    04/20/21 0315 04/20/21 0331 04/20/21 0400 04/20/21 0501   BP:  102/64 104/63    Pulse: 79 79 79    Resp: 15 22 21    Temp: 98.7 °F (37.1 °C) 98.9 °F (37.2 °C)     SpO2: 98% 97% 99%    Weight:    243 lb 9.7 oz (110.5 kg)   Height:             Intake/Output Summary (Last 24 hours) at 4/20/2021 0699  Last data filed at 4/20/2021 0601  Gross per 24 hour   Intake 2739.21 ml   Output 2505 ml   Net 234.21 ml       Physical Exam:          Constitutional:  the patient is well developed and in no acute distress  EENMT:  Sclera clear, pupils equal, oral mucosa moist  Respiratory: CTA  Cardiovascular:  RRR without M,G,R  Gastrointestinal: soft and non-tender; with positive bowel sounds. Musculoskeletal: warm without cyanosis. There is no lower extremity edema.   Skin:  no jaundice or rashes, sternal wound is okay  Neurologic: no gross neuro deficits     Psychiatric:  alert and oriented x 3    LINES:  Cordis  CT x3  headley    DRIPS:  insulin    CXR:           LAB  Recent Labs     04/20/21  0459 04/20/21  0308 04/20/21  0208 04/20/21  0113 04/20/21  0008   GLUCPOC 90 103* 111* 121* 114*      Recent Labs     04/20/21  0309 04/19/21  2120 04/19/21  1716 04/19/21  1322 04/18/21  0330   WBC 7.6  --   --  12.0* 7.2   HGB 10.2* 10.5* 11.0* 11.0* 12.3*   HCT 31.4* 31.7* 33.8* 34.4* 37.3*     --   --  195 263   INR  --   --   --  1.3  --      Recent Labs     04/20/21  0309 04/19/21  2120 04/19/21  1716 04/19/21  1322     --   --  141   K 4.2 4.4 4.4 4.4   *  --   --  112*   CO2 23  --   --  24   *  --   --  129*   BUN 13  --   --  12 CREA 0.64*  --   --  0.76*   MG 2.5* 2.6* 2.9* 3.3*   CA 8.3  --   --  8.7     Recent Labs     04/19/21  1322 04/19/21  1219 04/19/21  1127   PHI 7.34* 7.36 7.35   PCO2I 42.2 41.5 43.1   PO2I 82 168* 238*   HCO3I 22.8 23.1 23.7     No results for input(s): LCAD, LAC in the last 72 hours.   Recent Labs     04/19/21  1322 04/19/21  1219 04/19/21  1127   PHI 7.34* 7.36 7.35   PCO2I 42.2 41.5 43.1   PO2I 80 168* 12*   HCO3I 22.8 23.1 23.7       Patient Active Problem List   Diagnosis Code    CAD (coronary artery disease), native coronary artery I25.10    S/P PTCA (percutaneous transluminal coronary angioplasty) Z98.61    S/P CABG x 1 Z95.1    Dyslipidemia E78.5    Hypertensive heart disease with heart failure ([facility]) I11.0    Chronic systolic heart failure ([facility]) I50.22    Cervical spine pain M54.2    Unstale Angina Acute coronary syndrome ([facility]) I24.9    Dizziness/ Vertigo R42    Chronic ischemic Cardiomyopathy/heart disease I25.9    Chest pain R07.9    Myocardial infarction, anterior wall/, subsequent ([facility]) I21.09    Chest pain, unspecified R07.9    Chronic fatigue R53.82    Fatigue R53.83    Ischemic cardiomyopathy I25.5    Vasculogenic erectile dysfunction N52.9    Severe obesity ([facility]) E66.01    STEMI (ST elevation myocardial infarction) ([facility]) I21.3    Hypertension I10    S/P CABG x 3 Z95.1    Encounter for weaning from ventilator ([facility] [address] Z99.11    Atelectasis, bilateral J98.11         Assessment:  (Medical Decision Making)     Hospital Problems  Date Reviewed: 4/19/2021          Codes Class Noted POA    Atelectasis, bilateral ICD-10-CM: J98.11  ICD-9-CM: 518.0  4/20/2021 No        S/P CABG x 3 ICD-10-CM: Z95.1  ICD-9-CM: V45.81  4/19/2021 Unknown        Encounter for weaning from ventilator [facility]) ICD-10-CM: Z99.11  ICD-9-CM: V46.13  4/19/2021 Unknown        * (Principal) [facility] ICD-10-CM: I21.3  ICD-9-CM: 410.90  4/16/2021 Unknown        Hypertension ICD-10-CM: I10  ICD-9-CM: 401.9  4/16/2021 Unknown        Ischemic cardiomyopathy ICD-10-CM: I25.5  ICD-9-CM: 414.8  9/19/2016 Yes        CAD (coronary artery disease), native coronary artery (Chronic) ICD-10-CM: I25.10  ICD-9-CM: 414.01  12/18/2009 Yes    Overview Addendum 7/1/2016  8:18 AM by Ksasie Mckinney MD     MI age 27 had bypass surgery and stents (1995). EF 32% Dr. Renny Razo. On disability. EF 2016 39%. Had very limited ex capacity on stress test last year but had been doing better until this recent change. ([phone])                   Plan:  (Medical Decision Making)     --Controlled surgical pain adequately with alternating opioid with Toradol or other nonsteroidal  --IS as possible  --Wean O2  --Wean insulin drip per protocol  --Remove the lines when okay with the surgery team  --Mobilize as much as possible  --Other recommendation per surgery and cardiology    More than 50% of the time documented was spent in face-to-face contact with the patient and in the care of the patient on the floor/unit where the patient is located.     Vickie Bush MD

## 2021-04-21 ENCOUNTER — APPOINTMENT (OUTPATIENT)
Dept: GENERAL RADIOLOGY | Age: 56
DRG: 234 | End: 2021-04-21
Attending: PHYSICIAN ASSISTANT
Payer: MEDICARE

## 2021-04-21 PROBLEM — I10 HYPERTENSION: Chronic | Status: ACTIVE | Noted: 2021-04-16

## 2021-04-21 PROBLEM — Z99.11 ENCOUNTER FOR WEANING FROM VENTILATOR (HCC): Status: RESOLVED | Noted: 2021-04-19 | Resolved: 2021-04-21

## 2021-04-21 LAB
ERYTHROCYTE [DISTWIDTH] IN BLOOD BY AUTOMATED COUNT: 13.2 % (ref 11.9–14.6)
GLUCOSE BLD STRIP.AUTO-MCNC: 102 MG/DL (ref 65–100)
HCT VFR BLD AUTO: 32.5 % (ref 41.1–50.3)
HGB BLD-MCNC: 10.4 G/DL (ref 13.6–17.2)
MAGNESIUM SERPL-MCNC: 2.4 MG/DL (ref 1.8–2.4)
MCH RBC QN AUTO: 29.6 PG (ref 26.1–32.9)
MCHC RBC AUTO-ENTMCNC: 32 G/DL (ref 31.4–35)
MCV RBC AUTO: 92.6 FL (ref 79.6–97.8)
MM INDURATION POC: 0 MM (ref 0–5)
NRBC # BLD: 0 K/UL (ref 0–0.2)
PLATELET # BLD AUTO: 199 K/UL (ref 150–450)
PMV BLD AUTO: 9.1 FL (ref 9.4–12.3)
POTASSIUM SERPL-SCNC: 3.6 MMOL/L (ref 3.5–5.1)
PPD POC: NEGATIVE NEGATIVE
RBC # BLD AUTO: 3.51 M/UL (ref 4.23–5.6)
SERVICE CMNT-IMP: ABNORMAL
WBC # BLD AUTO: 8.6 K/UL (ref 4.3–11.1)

## 2021-04-21 PROCEDURE — 82962 GLUCOSE BLOOD TEST: CPT

## 2021-04-21 PROCEDURE — 77030012890

## 2021-04-21 PROCEDURE — 99024 POSTOP FOLLOW-UP VISIT: CPT | Performed by: PHYSICIAN ASSISTANT

## 2021-04-21 PROCEDURE — 84132 ASSAY OF SERUM POTASSIUM: CPT

## 2021-04-21 PROCEDURE — 74011250637 HC RX REV CODE- 250/637: Performed by: PHYSICIAN ASSISTANT

## 2021-04-21 PROCEDURE — 97530 THERAPEUTIC ACTIVITIES: CPT

## 2021-04-21 PROCEDURE — 77010033678 HC OXYGEN DAILY

## 2021-04-21 PROCEDURE — 83735 ASSAY OF MAGNESIUM: CPT

## 2021-04-21 PROCEDURE — 74011250636 HC RX REV CODE- 250/636: Performed by: PHYSICIAN ASSISTANT

## 2021-04-21 PROCEDURE — 85027 COMPLETE CBC AUTOMATED: CPT

## 2021-04-21 PROCEDURE — 71046 X-RAY EXAM CHEST 2 VIEWS: CPT

## 2021-04-21 PROCEDURE — 99232 SBSQ HOSP IP/OBS MODERATE 35: CPT | Performed by: INTERNAL MEDICINE

## 2021-04-21 PROCEDURE — 97110 THERAPEUTIC EXERCISES: CPT

## 2021-04-21 PROCEDURE — 65660000004 HC RM CVT STEPDOWN

## 2021-04-21 PROCEDURE — 2709999900 HC NON-CHARGEABLE SUPPLY

## 2021-04-21 PROCEDURE — 74011250637 HC RX REV CODE- 250/637: Performed by: THORACIC SURGERY (CARDIOTHORACIC VASCULAR SURGERY)

## 2021-04-21 PROCEDURE — 36415 COLL VENOUS BLD VENIPUNCTURE: CPT

## 2021-04-21 PROCEDURE — 94760 N-INVAS EAR/PLS OXIMETRY 1: CPT

## 2021-04-21 RX ORDER — CAPTOPRIL 12.5 MG/1
6.25 TABLET ORAL
Status: DISCONTINUED | OUTPATIENT
Start: 2021-04-22 | End: 2021-04-22

## 2021-04-21 RX ADMIN — POTASSIUM CHLORIDE 20 MEQ: 20 TABLET, EXTENDED RELEASE ORAL at 06:32

## 2021-04-21 RX ADMIN — ATORVASTATIN CALCIUM 80 MG: 80 TABLET, FILM COATED ORAL at 21:38

## 2021-04-21 RX ADMIN — SENNOSIDES AND DOCUSATE SODIUM 2 TABLET: 8.6; 5 TABLET ORAL at 21:38

## 2021-04-21 RX ADMIN — Medication 1 AMPULE: at 21:38

## 2021-04-21 RX ADMIN — ASPIRIN 81 MG: 81 TABLET ORAL at 08:58

## 2021-04-21 RX ADMIN — TRAMADOL HYDROCHLORIDE 50 MG: 50 TABLET, FILM COATED ORAL at 05:34

## 2021-04-21 RX ADMIN — AMIODARONE HYDROCHLORIDE 200 MG: 200 TABLET ORAL at 21:38

## 2021-04-21 RX ADMIN — FUROSEMIDE 40 MG: 40 TABLET ORAL at 08:58

## 2021-04-21 RX ADMIN — TRAMADOL HYDROCHLORIDE 50 MG: 50 TABLET, FILM COATED ORAL at 11:03

## 2021-04-21 RX ADMIN — OXYCODONE HYDROCHLORIDE AND ACETAMINOPHEN 1 TABLET: 5; 325 TABLET ORAL at 15:21

## 2021-04-21 RX ADMIN — OXYCODONE HYDROCHLORIDE AND ACETAMINOPHEN 1 TABLET: 10; 325 TABLET ORAL at 01:38

## 2021-04-21 RX ADMIN — SENNOSIDES AND DOCUSATE SODIUM 2 TABLET: 8.6; 5 TABLET ORAL at 08:58

## 2021-04-21 RX ADMIN — Medication 10 ML: at 05:34

## 2021-04-21 RX ADMIN — FAMOTIDINE 20 MG: 20 TABLET, FILM COATED ORAL at 08:58

## 2021-04-21 RX ADMIN — Medication 10 ML: at 21:38

## 2021-04-21 RX ADMIN — POTASSIUM CHLORIDE 10 MEQ: 750 TABLET, EXTENDED RELEASE ORAL at 08:59

## 2021-04-21 RX ADMIN — POTASSIUM CHLORIDE 20 MEQ: 20 TABLET, EXTENDED RELEASE ORAL at 17:21

## 2021-04-21 RX ADMIN — ONDANSETRON 4 MG: 2 INJECTION INTRAMUSCULAR; INTRAVENOUS at 08:39

## 2021-04-21 RX ADMIN — Medication 1 AMPULE: at 08:57

## 2021-04-21 RX ADMIN — CARVEDILOL 6.25 MG: 6.25 TABLET, FILM COATED ORAL at 21:38

## 2021-04-21 RX ADMIN — CARVEDILOL 6.25 MG: 6.25 TABLET, FILM COATED ORAL at 08:59

## 2021-04-21 RX ADMIN — OXYCODONE HYDROCHLORIDE AND ACETAMINOPHEN 1 TABLET: 5; 325 TABLET ORAL at 21:38

## 2021-04-21 RX ADMIN — AMIODARONE HYDROCHLORIDE 200 MG: 200 TABLET ORAL at 08:58

## 2021-04-21 RX ADMIN — TRAMADOL HYDROCHLORIDE 50 MG: 50 TABLET, FILM COATED ORAL at 20:02

## 2021-04-21 RX ADMIN — FAMOTIDINE 20 MG: 20 TABLET, FILM COATED ORAL at 17:20

## 2021-04-21 NOTE — PROGRESS NOTES
Problem: Falls - Risk of  Goal: *Absence of Falls  Description: Document Soledad Mars Fall Risk and appropriate interventions in the flowsheet.   Outcome: Progressing Towards Goal  Note: Fall Risk Interventions:  Mobility Interventions: Bed/chair exit alarm, Communicate number of staff needed for ambulation/transfer, Patient to call before getting OOB, PT Consult for mobility concerns, Strengthening exercises (ROM-active/passive)         Medication Interventions: Bed/chair exit alarm, Evaluate medications/consider consulting pharmacy, Patient to call before getting OOB, Teach patient to arise slowly    Elimination Interventions: Bed/chair exit alarm, Call light in reach, Patient to call for help with toileting needs, Stay With Me (per policy), Urinal in reach

## 2021-04-21 NOTE — PROGRESS NOTES
Today's Date: 4/21/2021  Date of Admission: 4/16/2021    Chart Reviewed. Subjective:     Patient complains of nausea. He denies any dyspnea. He has a mild productive cough. Appetite fair. Medications Reviewed. Objective:     Vitals:    04/21/21 0309 04/21/21 0400 04/21/21 0535 04/21/21 0724   BP: (!) 98/58   132/75   Pulse: 81 73  92   Resp: 18   16   Temp: 97.1 °F (36.2 °C)   98.2 °F (36.8 °C)   SpO2: 91%   97%   Weight:   240 lb 12.8 oz (109.2 kg)    Height:           Intake and Output  Current Shift: No intake/output data recorded. Last 3 Shifts: 04/19 1901 - 04/21 0700  In: 2118.5 [P.O.:1260; I.V.:858.5]  Out: 1765 [Urine:1580]    Physical Exam:  General: Well Developed, Well Nourished, No Acute Distress, Alert & Oriented x 3, Appropriate mood  Neck: supple, no JVD  Heart: S1S2 with RRR without murmurs or gallops  Lungs: mostly clear throughout auscultation bilaterally  Abd: soft, nontender, nondistended, with good bowel sounds  Ext: no edema bilaterally  Sternal incision: clean, dry, and intact  Skin: warm and dry    LABS  Data Review:   Recent Labs     04/21/21  0449 04/20/21  0309 04/19/21  1322 04/19/21  1322   NA  --  141  --  141   K 3.6 4.2   < > 4.4   MG 2.4 2.5*   < > 3.3*   BUN  --  13  --  12   CREA  --  0.64*  --  0.76*   GLU  --  108*  --  129*   WBC 8.6 7.6  --  12.0*   HGB 10.4* 10.2*   < > 11.0*   HCT 32.5* 31.4*   < > 34.4*    191  --  195   INR  --   --   --  1.3    < > = values in this interval not displayed. Estimated Creatinine Clearance: 148.5 mL/min (A) (by C-G formula based on SCr of 0.64 mg/dL (L)).       Assessment/Plan:     Principal Problem:    S/P CABG x 3 (4/19/2021)  On ASA, BB, statin, hold ARB for now with borderline low BP, stable on room air, pacing wires removed, continue PT    Active Problems:    CAD (coronary artery disease), native coronary artery (12/18/2009)  S/p CABG, continue medical therapy       Ischemic cardiomyopathy (9/19/2016)  Continue Coreg, will resume ARB when BP allows       STEMI (ST elevation myocardial infarction) ([facility] [address] (4/16/2021)  S/p CABG       Hypertension (4/16/2021)  BP low at times, monitor       Atelectasis, bilateral (4/20/2021)  IS           Gilbert Magdaleno PA-C

## 2021-04-21 NOTE — PROGRESS NOTES
[facility] CARDIOLOGY PROGRESS NOTE           4/21/2021 5:13 PM    Admit Date: 4/16/2021      Subjective:   No cp or inc sob      Objective:      Vitals:    04/21/21 0724 04/21/21 1130 04/21/21 1206 04/21/21 1544   BP: 132/75 116/63  104/65   Pulse: 92 89  77   Resp: 16 20 16   Temp: 98.2 °F (36.8 °C) 99.1 °F (37.3 °C)  98.3 °F (36.8 °C)   SpO2: 97% (!) 86% 92% 96%   Weight:       Height:           Physical Exam:  General-No Acute Distress  Neck- supple, no JVD  CV- regular rate and rhythm no MRG  Lung- clear bilaterally  Abd- soft, nontender, nondistended  Ext- no edema bilaterally. Skin- warm and dry    Data Review:   Recent Labs     04/21/21  0449 04/20/21  0309 04/19/21  1322 04/19/21  1322   NA  --  141  --  141   K 3.6 4.2   < > 4.4   MG 2.4 2.5*   < > 3.3*   BUN  --  13  --  12   CREA  --  0.64*  --  0.76*   GLU  --  108*  --  129*   WBC 8.6 7.6  --  12.0*   HGB 10.4* 10.2*   < > 11.0*   HCT 32.5* 31.4*   < > 34.4*    191  --  195   INR  --   --   --  1.3    < > = values in this interval not displayed.        Assessment/Plan:     Principal Problem:    S/P CABG x 3 (4/19/2021)        Active Problems:    CAD (coronary artery disease), native coronary artery (12/18/2009)          Ischemic cardiomyopathy (9/19/2016)          STEMI (ST elevation myocardial infarction) ([facility] [address] (4/16/2021)          Hypertension (4/16/2021)          Atelectasis, bilateral (4/20/2021)      ////    Add ultra low dose acei            Navarro Welch MD  4/21/2021 5:13 PM

## 2021-04-21 NOTE — PROGRESS NOTES
ACUTE PHYSICAL THERAPY GOALS:  (Developed with and agreed upon by patient and/or caregiver.)  1. Mr. Ajith Oden will perform supine to sit and sit to supine independently in 7 days. 2.  Mr. Ajith Oden will perform sit to stand and bed to chair independently in 7 days. 3.  Mr. Ajtih Oden will perform gait >1000 ft independently in 7 days. 4.  Mr. Ajith Oden will go up and down 3 steps with rail independently in 7 days. 5.  Mr. Ajith Oden will perform therex x 25 reps in sitting and standing in 7 days.          PHYSICAL THERAPY: Daily Note and PM Treatment Day # 2    Annalee Meigs is a 61 y.o. male   PRIMARY DIAGNOSIS: S/P CABG x 3  STEMI (ST elevation myocardial infarction) ([facility] [address] [I21.3]  Procedure(s) (LRB):  CORONARY ARTERY BYPASS GRAFT (CABG X3) (N/A)  STERNOTOMY REDO (N/A)  2 Days Post-Op    ASSESSMENT:     REHAB RECOMMENDATIONS: CURRENT LEVEL OF FUNCTION:  (Most Recently Demonstrated)   Recommendation to date pending progress:  [address] [place] [address] Therapy  Equipment:    To Be Determined Bed Mobility:   Not tested  Sit to Stand:   Standby Assistance  Transfers:   Standby Assistance  Gait/Mobility:  Edwige Wei Standby Assistance     ASSESSMENT:  Mr. Ajith Oden is sitting in the recliner and agreeable to therapy. Patient is on 2L O2  and saturations are 94%. Sit to stand with stand by assist.    Gait x 200 feet with slow yolis without an assistive device. Patient is returned to the recliner and after a short rest break performed therapeutic exercises. Doing well. Making good progress. Left in recliner with needs within reach. Home at [address] Will continue PT efforts.      SUBJECTIVE:   Mr. Ajith Oden states, \"I was just trying to get a nap\"    SOCIAL HISTORY/ LIVING ENVIRONMENT:  sister will be staying with him  Home Environment: Private residence  One/Two Story Residence: Two story  Living Alone: Yes  Support Systems: Family member(s)  OBJECTIVE:     PAIN: VITAL SIGNS: LINES/DRAINS:   Pre Treatment:  0/10  Post Treatment: no number given   none  O2 Device: Nasal cannula     MOBILITY: I Mod I S SBA CGA Min Mod Max Total  NT x2 Comments:   Bed Mobility    Rolling [] [] [] [] [] [] [] [] [] [x] []    Supine to Sit [] [] [] [] [] [] [] [] [] [x] []    Scooting [] [] [] [] [] [] [] [] [] [x] []    Sit to Supine [] [] [] [] [] [] [] [] [] [x] []    Transfers    Sit to Stand [] [] [] [x] [] [] [] [] [] [] []    Bed to Chair [] [] [] [] [] [] [] [] [] [x] []    Stand to Sit [] [] [] [] [] [] [] [] [] [] []    I=Independent, Mod I=Modified Independent, S=Supervision, SBA=Standby Assistance, CGA=Contact Guard Assistance,   Min=Minimal Assistance, Mod=Moderate Assistance, Max=Maximal Assistance, Total=Total Assistance, NT=Not Tested    BALANCE: Good Fair+ Fair Fair- Poor NT Comments   Sitting Static [x] [] [] [] [] []    Sitting Dynamic [x] [] [] [] [] []              Standing Static [x] [x] [] [] [] []    Standing Dynamic [x] [x] [] [] [] []      GAIT: I Mod I S SBA CGA Min Mod Max Total  NT x2 Comments:   Level of Assistance [] [] [] [] [x] [] [] [] [] [] []    Distance 200 feet     DME Rolling Walker    Gait Quality Slow yolis    Weightbearing  Status N/A     I=Independent, Mod I=Modified Independent, S=Supervision, SBA=Standby Assistance, CGA=Contact Guard Assistance,   Min=Minimal Assistance, Mod=Moderate Assistance, Max=Maximal Assistance, Total=Total Assistance, NT=Not Tested    PLAN:   FREQUENCY/DURATION: PT Plan of Care: BID for duration of hospital stay or until stated goals are met, whichever comes first.  TREATMENT:     TREATMENT:   ($$ Therapeutic Activity: 8-22 mins  $$ Therapeutic Exercises: 8-22 mins    )  Therapeutic Activity (13 Minutes): Therapeutic activity included Transfer Training, Ambulation on level ground, Sitting balance  and Standing balance to improve functional Mobility, Strength and Activity tolerance. Therapeutic Exercise (10 Minutes):  Therapeutic exercises noted below to improve functional activity tolerance, strength and mobility.      TREATMENT GRID:     Date:  04/21/21 Date:   Date:     ACTIVITY/EXERCISE AM PM AM PM AM PM   LAQ 15 15       Shoulder shrugs 15 15       Gluteal sets 15 15       marching 15 15       Ankle pumps 15 15       abduction 15 15                B = bilateral; AA = active assistive; A = active; P = passive    AFTER TREATMENT POSITION/PRECAUTIONS:  Chair, Needs within reach and RN notified    INTERDISCIPLINARY COLLABORATION:  PT/PTA and OT/SILVA    TOTAL TREATMENT DURATION:  PT Patient Time In/Time Out  Time In: 1340  Time Out: 1403    Cam Limes, PTA

## 2021-04-21 NOTE — PROGRESS NOTES
Lvoe Erp  Admission Date: 4/16/2021             Daily Progress Note: 4/21/2021     Patient is seen at the request of Dr. Rueda for respiratory management status post cardiac surgery.  Patient had CAD s/p CABG in 1995, HTN, ICM, chronic systolic CHF, chronic pain, and GERD. S/P CABG X 3 4/19 per CT surgery and redo sternotomy. Extubated on 4/19  Subjective:     POD # 2. On RA, up to the chair.  Pulling ~ 1250 on IS       Review of Systems  Constitutional: negative for fevers and chills  Respiratory: negative for cough or dyspnea on exertion  Cardiovascular: positive for chest pressure/discomfort    Current Facility-Administered Medications   Medication Dose Route Frequency    benzocaine-menthol (CEPACOL) lozenge  1 Lozenge Oral Q2H PRN    furosemide (LASIX) tablet 40 mg  40 mg Oral DAILY    magnesium hydroxide (MILK OF MAGNESIA) 400 mg/5 mL oral suspension 30 mL  30 mL Oral DAILY PRN    alum-mag hydroxide-simeth (MYLANTA) oral suspension 30 mL  30 mL Oral Q4H PRN    famotidine (PEPCID) tablet 20 mg  20 mg Oral BID    ondansetron (ZOFRAN) injection 4 mg  4 mg IntraVENous Q4H PRN    magnesium oxide (MAG-OX) tablet 400 mg  400 mg Oral TID PRN    magnesium oxide (MAG-OX) tablet 400 mg  400 mg Oral QID PRN    potassium chloride (KLOR-CON) tablet 10 mEq  10 mEq Oral DAILY    potassium chloride (K-DUR, KLOR-CON) SR tablet 20 mEq  20 mEq Oral BID PRN    potassium chloride (K-DUR, KLOR-CON) SR tablet 40 mEq  40 mEq Oral BID PRN    insulin lispro (HUMALOG) injection   SubCUTAneous AC&HS    senna-docusate (PERICOLACE) 8.6-50 mg per tablet 2 Tab  2 Tab Oral Q12H    amiodarone (CORDARONE) tablet 200 mg  200 mg Oral Q12H    oxyCODONE-acetaminophen (PERCOCET) 5-325 mg per tablet 1 Tab  1 Tab Oral Q4H PRN    traMADoL (ULTRAM) tablet 50 mg  50 mg Oral Q6H PRN    sodium chloride (NS) flush 5-40 mL  5-40 mL IntraVENous Q8H    sodium chloride (NS) flush 5-40 mL  5-40 mL IntraVENous PRN    acetaminophen (TYLENOL) tablet 650 mg  650 mg Oral Q4H PRN    lip protectant (BLISTEX) ointment 1 Each  1 Each Topical PRN    carvediloL (COREG) tablet 6.25 mg  6.25 mg Oral Q12H    atorvastatin (LIPITOR) tablet 80 mg  80 mg Oral QHS    oxyCODONE-acetaminophen (PERCOCET 10)  mg per tablet 1 Tab  1 Tab Oral Q4H PRN    alcohol 62% (NOZIN) nasal  1 Ampule  1 Ampule Topical Q12H    aspirin delayed-release tablet 81 mg  81 mg Oral DAILY    [Held by provider] losartan (COZAAR) tablet 25 mg  25 mg Oral DAILY         Objective:     Vitals:    04/21/21 0309 04/21/21 0400 04/21/21 0535 04/21/21 0724   BP: (!) 98/58   132/75   Pulse: 81 73  92   Resp: 18   16   Temp: 97.1 °F (36.2 °C)   98.2 °F (36.8 °C)   SpO2: 91%   97%   Weight:   240 lb 12.8 oz (109.2 kg)    Height:         Intake and Output:   04/19 1901 - 04/21 0700  In: 2118.5 [P.O.:1260; I.V.:858.5]  Out: 1765 [Urine:1580]  No intake/output data recorded. Intake/Output Summary (Last 24 hours) at 4/21/2021 0830  Last data filed at 4/21/2021 6015  Gross per 24 hour   Intake 1260 ml   Output 1215 ml   Net 45 ml       Physical Exam:          Constitutional: the patient is well develop  HEENT: Sclera clear, pupils equal, oral mucosa moist  Lungs: CTA on RA  Cardiovascular: RRR without M,G,R, sternal incision CDI  Abd/GI: soft and non-tender; with positive bowel sounds. Ext: warm without cyanosis. There is no lower leg edema. Musculoskeletal: moves all four extremities with equal strength  Skin: no jaundice or rashes, no wounds   Neuro: no gross neuro deficits       Lines/Drains: IV    Nutrition: cardiac    CHEST XRAY:       LAB  Recent Labs     04/21/21  0449 04/20/21  0309 04/19/21  2120 04/19/21  1322 04/19/21  1322   WBC 8.6 7.6  --   --  12.0*   HGB 10.4* 10.2* 10.5*   < > 11.0*   HCT 32.5* 31.4* 31.7*   < > 34.4*    191  --   --  195    < > = values in this interval not displayed.      Recent Labs     04/21/21  0449 04/20/21  0309 04/19/21 2120 04/19/21  1322 04/19/21  1322   NA  --  141  --   --  141   K 3.6 4.2 4.4   < > 4.4   CL  --  111*  --   --  112*   CO2  --  23  --   --  24   GLU  --  108*  --   --  129*   BUN  --  13  --   --  12   CREA  --  0.64*  --   --  0.76*   MG 2.4 2.5* 2.6*   < > 3.3*   INR  --   --   --   --  1.3    < > = values in this interval not displayed. Assessment:     Hospital Problems  Date Reviewed: 4/19/2021          Codes Class Noted POA    Atelectasis, bilateral ICD-10-CM: J98.11  ICD-9-CM: 518.0  4/20/2021 No        S/P CABG x 3 ICD-10-CM: Z95.1  ICD-9-CM: V45.81  4/19/2021 Unknown        * (Principal) STEMI (ST elevation myocardial infarction) ([facility] [address] ICD-10-CM: I21.3  ICD-9-CM: 410.90  4/16/2021 Unknown        Hypertension (Chronic) ICD-10-CM: I10  ICD-9-CM: 401.9  4/16/2021 Yes        Ischemic cardiomyopathy ICD-10-CM: I25.5  ICD-9-CM: 414.8  9/19/2016 Yes        CAD (coronary artery disease), native coronary artery (Chronic) ICD-10-CM: I25.10  ICD-9-CM: 414.01  12/18/2009 Yes    Overview Addendum 7/1/2016  8:18 AM by Zurdo Hanley MD     MI age 27 had bypass surgery and stents (1995). EF 32% Dr. Natalie Juan. On disability. EF 2016 39%. Had very limited ex capacity on stress test last year but had been doing better until this recent change. ([phone])                 S/P redo CABG X 3. PLAN:     IS, mobilize  He is on RA      [employer], NP-C    More than 50% of time documented was spent in face-to-face contact with the patient and in the care of the patient on the floor/unit where the patient is located. Lungs:  CTA B, no w/r/r  Heart:  RRR with no Murmur/Rubs/Gallops    Additional Comments:    Patient just walked on RA. Sats marginal with exertion. CXR with mostly atelectasis, possible small effusion as well. With these marginal sats will plan to see him one more day. As long as he continues to make improvements will hopefully be able to sign off tomorrow. Cont IS.      I have spoken with and examined the patient. I agree with the above assessment and plan as documented.     George Fajardo MD

## 2021-04-21 NOTE — PROGRESS NOTES
ACUTE PHYSICAL THERAPY GOALS:  (Developed with and agreed upon by patient and/or caregiver.)  1. Mr. Deepak Leon will perform supine to sit and sit to supine independently in 7 days. 2.  Mr. Deepak Leon will perform sit to stand and bed to chair independently in 7 days. 3.  Mr. Deepak Leon will perform gait >1000 ft independently in 7 days. 4.  Mr. Deepak Leon will go up and down 3 steps with rail independently in 7 days. 5.  Mr. Deepak Leon will perform therex x 25 reps in sitting and standing in 7 days.          PHYSICAL THERAPY: Daily Note and AM Treatment Day # 2    Ketty Gamez is a 61 y.o. male   PRIMARY DIAGNOSIS: S/P CABG x 3  STEMI (ST elevation myocardial infarction) ([facility]) [I21.3]  Procedure(s) (LRB):  CORONARY ARTERY BYPASS GRAFT (CABG X3) (N/A)  STERNOTOMY REDO (N/A)  2 Days Post-Op    ASSESSMENT:     REHAB RECOMMENDATIONS: CURRENT LEVEL OF FUNCTION:  (Most Recently Demonstrated)   Recommendation to date pending progress:  [address] [place] [address] Therapy  Equipment:    To Be Determined Bed Mobility:   Not tested  Sit to Stand:   Standby Assistance  Transfers:   Standby Assistance  Gait/Mobility:   Standby Assistance     ASSESSMENT:  Mr. Deepak Leon is sitting in the recliner and feeling better now. Brother is at bedside. MD visits during treatment session. Patient is on RA and saturations are 90%. Sit to stand with stand by assist to the walker. Gait x 200 feet with slow yolis. Patient is returned to the recliner and after a short rest break instructed in therapeutic exercises. Doing well. Increased gait distance. Left in recliner with needs within reach. Home at [address] Will continue PT efforts. SUBJECTIVE:   Mr. Deepak Leon states, \"OK, I;m ready. \"    SOCIAL HISTORY/ LIVING ENVIRONMENT:  sister will be staying with him  Home Environment: Private residence  One/Two Story Residence: Two story  Living Alone: Yes  Support Systems: Family member(s)  OBJECTIVE:     PAIN: VITAL SIGNS: LINES/DRAINS: Pre Treatment:  0/10  Post Treatment: no number given   none  O2 Device: Nasal cannula     MOBILITY: I Mod I S SBA CGA Min Mod Max Total  NT x2 Comments:   Bed Mobility    Rolling [] [] [] [] [] [] [] [] [] [x] []    Supine to Sit [] [] [] [] [] [] [] [] [] [x] []    Scooting [] [] [] [] [] [] [] [] [] [x] []    Sit to Supine [] [] [] [] [] [] [] [] [] [x] []    Transfers    Sit to Stand [] [] [] [x] [] [] [] [] [] [] []    Bed to Chair [] [] [] [] [] [] [] [] [] [x] []    Stand to Sit [] [] [] [] [] [] [] [] [] [] []    I=Independent, Mod I=Modified Independent, S=Supervision, SBA=Standby Assistance, CGA=Contact Guard Assistance,   Min=Minimal Assistance, Mod=Moderate Assistance, Max=Maximal Assistance, Total=Total Assistance, NT=Not Tested    BALANCE: Good Fair+ Fair Fair- Poor NT Comments   Sitting Static [x] [] [] [] [] []    Sitting Dynamic [x] [] [] [] [] []              Standing Static [x] [x] [] [] [] []    Standing Dynamic [x] [x] [] [] [] []      GAIT: I Mod I S SBA CGA Min Mod Max Total  NT x2 Comments:   Level of Assistance [] [] [] [] [x] [] [] [] [] [] []    Distance 200 feet     DME Rolling Walker    Gait Quality Slow yolis    Weightbearing  Status N/A     I=Independent, Mod I=Modified Independent, S=Supervision, SBA=Standby Assistance, CGA=Contact Guard Assistance,   Min=Minimal Assistance, Mod=Moderate Assistance, Max=Maximal Assistance, Total=Total Assistance, NT=Not Tested    PLAN:   FREQUENCY/DURATION: PT Plan of Care: BID for duration of hospital stay or until stated goals are met, whichever comes first.  TREATMENT:     TREATMENT:   ($$ Therapeutic Activity: 8-22 mins  $$ Therapeutic Exercises: 8-22 mins    )  Therapeutic Activity (13 Minutes): Therapeutic activity included Transfer Training, Ambulation on level ground, Sitting balance  and Standing balance to improve functional Mobility, Strength and Activity tolerance. Therapeutic Exercise (12 Minutes):  Therapeutic exercises noted below to improve functional activity tolerance, strength and mobility.      TREATMENT GRID:     Date:  04/21/21 Date:   Date:     ACTIVITY/EXERCISE AM PM AM PM AM PM   LAQ 15        Shoulder shrugs 15        Gluteal sets 15        marching 15        Ankle pumps 15        abduction 15                 B = bilateral; AA = active assistive; A = active; P = passive    AFTER TREATMENT POSITION/PRECAUTIONS:  Chair, Needs within reach, RN notified and Visitors at bedside    INTERDISCIPLINARY COLLABORATION:  MD/PA/NP, PT/PTA and OT/SILVA    TOTAL TREATMENT DURATION:  PT Patient Time In/Time Out  Time In: 1030  Time Out: [address] [place], PTA

## 2021-04-22 LAB
ABO + RH BLD: NORMAL
BLD PROD TYP BPU: NORMAL
BLOOD BANK CMNT PATIENT-IMP: NORMAL
BLOOD GROUP ANTIBODIES SERPL: NORMAL
BPU ID: NORMAL
CROSSMATCH RESULT,%XM: NORMAL
MAGNESIUM SERPL-MCNC: 2.4 MG/DL (ref 1.8–2.4)
POTASSIUM SERPL-SCNC: 4 MMOL/L (ref 3.5–5.1)
SPECIMEN EXP DATE BLD: NORMAL
STATUS OF UNIT,%ST: NORMAL
UNIT DIVISION, %UDIV: 0

## 2021-04-22 PROCEDURE — 74011250637 HC RX REV CODE- 250/637: Performed by: PHYSICIAN ASSISTANT

## 2021-04-22 PROCEDURE — 74011250637 HC RX REV CODE- 250/637: Performed by: INTERNAL MEDICINE

## 2021-04-22 PROCEDURE — 97110 THERAPEUTIC EXERCISES: CPT

## 2021-04-22 PROCEDURE — 36415 COLL VENOUS BLD VENIPUNCTURE: CPT

## 2021-04-22 PROCEDURE — 99232 SBSQ HOSP IP/OBS MODERATE 35: CPT | Performed by: INTERNAL MEDICINE

## 2021-04-22 PROCEDURE — 74011250637 HC RX REV CODE- 250/637: Performed by: THORACIC SURGERY (CARDIOTHORACIC VASCULAR SURGERY)

## 2021-04-22 PROCEDURE — 2709999900 HC NON-CHARGEABLE SUPPLY

## 2021-04-22 PROCEDURE — 97530 THERAPEUTIC ACTIVITIES: CPT

## 2021-04-22 PROCEDURE — 77010033678 HC OXYGEN DAILY

## 2021-04-22 PROCEDURE — 65660000004 HC RM CVT STEPDOWN

## 2021-04-22 PROCEDURE — 84132 ASSAY OF SERUM POTASSIUM: CPT

## 2021-04-22 PROCEDURE — 94760 N-INVAS EAR/PLS OXIMETRY 1: CPT

## 2021-04-22 PROCEDURE — 83735 ASSAY OF MAGNESIUM: CPT

## 2021-04-22 RX ORDER — FACIAL-BODY WIPES
10 EACH TOPICAL DAILY PRN
Status: DISCONTINUED | OUTPATIENT
Start: 2021-04-22 | End: 2021-04-23 | Stop reason: HOSPADM

## 2021-04-22 RX ADMIN — SENNOSIDES AND DOCUSATE SODIUM 2 TABLET: 8.6; 5 TABLET ORAL at 08:28

## 2021-04-22 RX ADMIN — Medication 1 AMPULE: at 20:34

## 2021-04-22 RX ADMIN — Medication 10 ML: at 14:52

## 2021-04-22 RX ADMIN — POTASSIUM CHLORIDE 20 MEQ: 20 TABLET, EXTENDED RELEASE ORAL at 20:34

## 2021-04-22 RX ADMIN — Medication 1 AMPULE: at 08:29

## 2021-04-22 RX ADMIN — OXYCODONE HYDROCHLORIDE AND ACETAMINOPHEN 1 TABLET: 5; 325 TABLET ORAL at 23:13

## 2021-04-22 RX ADMIN — TRAMADOL HYDROCHLORIDE 50 MG: 50 TABLET, FILM COATED ORAL at 08:35

## 2021-04-22 RX ADMIN — CARVEDILOL 6.25 MG: 6.25 TABLET, FILM COATED ORAL at 08:27

## 2021-04-22 RX ADMIN — OXYCODONE HYDROCHLORIDE AND ACETAMINOPHEN 1 TABLET: 5; 325 TABLET ORAL at 14:45

## 2021-04-22 RX ADMIN — TRAMADOL HYDROCHLORIDE 50 MG: 50 TABLET, FILM COATED ORAL at 20:35

## 2021-04-22 RX ADMIN — AMIODARONE HYDROCHLORIDE 200 MG: 200 TABLET ORAL at 20:34

## 2021-04-22 RX ADMIN — MAGNESIUM HYDROXIDE 30 ML: 2400 SUSPENSION ORAL at 09:18

## 2021-04-22 RX ADMIN — Medication 10 ML: at 20:40

## 2021-04-22 RX ADMIN — POTASSIUM CHLORIDE 20 MEQ: 20 TABLET, EXTENDED RELEASE ORAL at 06:31

## 2021-04-22 RX ADMIN — AMIODARONE HYDROCHLORIDE 200 MG: 200 TABLET ORAL at 08:28

## 2021-04-22 RX ADMIN — CARVEDILOL 6.25 MG: 6.25 TABLET, FILM COATED ORAL at 20:36

## 2021-04-22 RX ADMIN — FAMOTIDINE 20 MG: 20 TABLET, FILM COATED ORAL at 08:28

## 2021-04-22 RX ADMIN — ATORVASTATIN CALCIUM 80 MG: 80 TABLET, FILM COATED ORAL at 20:34

## 2021-04-22 RX ADMIN — Medication 10 ML: at 06:32

## 2021-04-22 RX ADMIN — FUROSEMIDE 40 MG: 40 TABLET ORAL at 08:28

## 2021-04-22 RX ADMIN — POTASSIUM CHLORIDE 10 MEQ: 750 TABLET, EXTENDED RELEASE ORAL at 08:26

## 2021-04-22 RX ADMIN — CAPTOPRIL 6.25 MG: 12.5 TABLET ORAL at 08:27

## 2021-04-22 RX ADMIN — FAMOTIDINE 20 MG: 20 TABLET, FILM COATED ORAL at 18:23

## 2021-04-22 NOTE — PROGRESS NOTES
Edilberto Galvin  Admission Date: 4/16/2021             Daily Progress Note: 4/22/2021     Patient is seen at the request of Dr. Rueda for respiratory management status post cardiac surgery.  Patient had CAD s/p CABG in 1995, HTN, ICM, chronic systolic CHF, chronic pain, and GERD. S/P CABG X 3 4/19 per CT surgery and redo sternotomy. Extubated on 4/19  Subjective:     POD # 3. On RA, up to the chair.   Some incisional pain        Review of Systems  Constitutional: negative for fevers and chills  Respiratory: negative for cough or dyspnea on exertion  Cardiovascular: positive for chest pressure/discomfort    Current Facility-Administered Medications   Medication Dose Route Frequency    captopriL (CAPOTEN) tablet 6.25 mg  6.25 mg Oral TIDAC    benzocaine-menthol (CEPACOL) lozenge  1 Lozenge Oral Q2H PRN    furosemide (LASIX) tablet 40 mg  40 mg Oral DAILY    magnesium hydroxide (MILK OF MAGNESIA) 400 mg/5 mL oral suspension 30 mL  30 mL Oral DAILY PRN    alum-mag hydroxide-simeth (MYLANTA) oral suspension 30 mL  30 mL Oral Q4H PRN    famotidine (PEPCID) tablet 20 mg  20 mg Oral BID    ondansetron (ZOFRAN) injection 4 mg  4 mg IntraVENous Q4H PRN    magnesium oxide (MAG-OX) tablet 400 mg  400 mg Oral TID PRN    magnesium oxide (MAG-OX) tablet 400 mg  400 mg Oral QID PRN    potassium chloride (KLOR-CON) tablet 10 mEq  10 mEq Oral DAILY    potassium chloride (K-DUR, KLOR-CON) SR tablet 20 mEq  20 mEq Oral BID PRN    potassium chloride (K-DUR, KLOR-CON) SR tablet 40 mEq  40 mEq Oral BID PRN    senna-docusate (PERICOLACE) 8.6-50 mg per tablet 2 Tab  2 Tab Oral Q12H    amiodarone (CORDARONE) tablet 200 mg  200 mg Oral Q12H    oxyCODONE-acetaminophen (PERCOCET) 5-325 mg per tablet 1 Tab  1 Tab Oral Q4H PRN    traMADoL (ULTRAM) tablet 50 mg  50 mg Oral Q6H PRN    sodium chloride (NS) flush 5-40 mL  5-40 mL IntraVENous Q8H    sodium chloride (NS) flush 5-40 mL  5-40 mL IntraVENous PRN    acetaminophen (TYLENOL) tablet 650 mg  650 mg Oral Q4H PRN    lip protectant (BLISTEX) ointment 1 Each  1 Each Topical PRN    carvediloL (COREG) tablet 6.25 mg  6.25 mg Oral Q12H    atorvastatin (LIPITOR) tablet 80 mg  80 mg Oral QHS    oxyCODONE-acetaminophen (PERCOCET 10)  mg per tablet 1 Tab  1 Tab Oral Q4H PRN    alcohol 62% (NOZIN) nasal  1 Ampule  1 Ampule Topical Q12H    aspirin delayed-release tablet 81 mg  81 mg Oral DAILY    [Held by provider] losartan (COZAAR) tablet 25 mg  25 mg Oral DAILY         Objective:     Vitals:    04/21/21 2258 04/22/21 0415 04/22/21 0416 04/22/21 0710   BP: 110/69 107/64  107/63   Pulse: 72 74  69   Resp: 18 18     Temp: 98.9 °F (37.2 °C) 98.4 °F (36.9 °C)  98.4 °F (36.9 °C)   SpO2: 96% 96%  98%   Weight:   238 lb 3.2 oz (108 kg)    Height:         Intake and Output:   04/20 1901 - 04/22 0700  In: 420 [P.O.:420]  Out: 1950 [Urine:1950]  No intake/output data recorded. Intake/Output Summary (Last 24 hours) at 4/22/2021 0816  Last data filed at 4/22/2021 0417  Gross per 24 hour   Intake 240 ml   Output 1450 ml   Net -1210 ml       Physical Exam:          Constitutional: the patient is well develop  HEENT: Sclera clear, pupils equal, oral mucosa moist  Lungs: CTA on RA  Cardiovascular: RRR without M,G,R, sternal incision CDI  Abd/GI: soft and non-tender; with positive bowel sounds. Ext: warm without cyanosis. There is no lower leg edema. Musculoskeletal: moves all four extremities with equal strength  Skin: no jaundice or rashes, no wounds   Neuro: no gross neuro deficits       Lines/Drains: IV    Nutrition: cardiac    CHEST XRAY:       LAB  Recent Labs     04/21/21  0449 04/20/21  0309 04/19/21  2120 04/19/21  1322 04/19/21  1322   WBC 8.6 7.6  --   --  12.0*   HGB 10.4* 10.2* 10.5*   < > 11.0*   HCT 32.5* 31.4* 31.7*   < > 34.4*    191  --   --  195    < > = values in this interval not displayed.      Recent Labs     04/22/21  0500 04/21/21  0441 04/20/21  0309 04/19/21  1322 04/19/21  1322   NA  --   --  141  --  141   K 4.0 3.6 4.2   < > 4.4   CL  --   --  111*  --  112*   CO2  --   --  23  --  24   GLU  --   --  108*  --  129*   BUN  --   --  13  --  12   CREA  --   --  0.64*  --  0.76*   MG 2.4 2.4 2.5*   < > 3.3*   INR  --   --   --   --  1.3    < > = values in this interval not displayed. Assessment:     Hospital Problems  Date Reviewed: 4/19/2021          Codes Class Noted POA    Atelectasis, bilateral ICD-10-CM: J98.11  ICD-9-CM: 518.0  4/20/2021 No        * (Principal) S/P CABG x 3 ICD-10-CM: Z95.1  ICD-9-CM: V45.81  4/19/2021 Unknown        STEMI (ST elevation myocardial infarction) ([facility] [address] ICD-10-CM: I21.3  ICD-9-CM: 410.90  4/16/2021 Unknown        Hypertension (Chronic) ICD-10-CM: I10  ICD-9-CM: 401.9  4/16/2021 Yes        Ischemic cardiomyopathy ICD-10-CM: I25.5  ICD-9-CM: 414.8  9/19/2016 Yes        CAD (coronary artery disease), native coronary artery (Chronic) ICD-10-CM: I25.10  ICD-9-CM: 414.01  12/18/2009 Yes    Overview Addendum 7/1/2016  8:18 AM by Sara Sierra MD     MI age 27 had bypass surgery and stents (1995). EF 32% Dr. Aurea Cai. On disability. EF 2016 39%. Had very limited ex capacity on stress test last year but had been doing better until this recent change. ([phone])                 S/P redo CABG X 3. PLAN:     IS, mobilize  He is on RA  Nothing else to add, will sign off, call if needed        Jason Wasserman MD          More than 50% of time documented was spent in face-to-face contact with the patient and in the care of the patient on the floor/unit where the patient is located.

## 2021-04-22 NOTE — PROGRESS NOTES
Today's Date: 4/22/2021  Date of Admission: 4/16/2021    Chart Reviewed. Subjective:     Patient feels ok. He complains of some abdominal discomfort from constipation. He denies any dyspnea. Appetite fair. Medications Reviewed. Objective:     Vitals:    04/21/21 2258 04/22/21 0415 04/22/21 0416 04/22/21 0710   BP: 110/69 107/64  107/63   Pulse: 72 74  69   Resp: 18 18     Temp: 98.9 °F (37.2 °C) 98.4 °F (36.9 °C)  98.4 °F (36.9 °C)   SpO2: 96% 96%  98%   Weight:   238 lb 3.2 oz (108 kg)    Height:           Intake and Output  Current Shift: No intake/output data recorded. Last 3 Shifts: 04/20 1901 - 04/22 0700  In: 5 [P.O.:420]  Out: 1950 [Urine:1950]    Physical Exam:  General: Well Developed, Well Nourished, No Acute Distress, Alert & Oriented x 3, Appropriate mood  Neck: supple, no JVD  Heart: S1S2 with RRR without murmurs or gallops  Lungs: Clear throughout auscultation bilaterally without adventitious sounds  Abd: soft, nontender, nondistended, with good bowel sounds  Ext: no edema bilaterally  Sternal incision: clean, dry, and intact  Skin: warm and dry    LABS  Data Review:   Recent Labs     04/22/21  0500 04/21/21  0449 04/20/21  0309 04/19/21  1322 04/19/21  1322   NA  --   --  141  --  141   K 4.0 3.6 4.2   < > 4.4   MG 2.4 2.4 2.5*   < > 3.3*   BUN  --   --  13  --  12   CREA  --   --  0.64*  --  0.76*   GLU  --   --  108*  --  129*   WBC  --  8.6 7.6  --  12.0*   HGB  --  10.4* 10.2*   < > 11.0*   HCT  --  32.5* 31.4*   < > 34.4*   PLT  --  199 191  --  195   INR  --   --   --   --  1.3    < > = values in this interval not displayed. Estimated Creatinine Clearance: 147.9 mL/min (A) (by C-G formula based on SCr of 0.64 mg/dL (L)).       Assessment/Plan:     Principal Problem:    S/P CABG x 3 (4/19/2021)  On ASA, BB, statin, continue to hold ARB for now with borderline low BP, stable on room air, pacing wires removed, continue PT     Active Problems:    CAD (coronary artery disease), native coronary artery (12/18/2009)  S/p CABG, continue medical therapy        Ischemic cardiomyopathy (9/19/2016)  Continue Coreg, will resume ARB when BP allows        STEMI (ST elevation myocardial infarction) ([facility] [address] (4/16/2021)  S/p CABG        Hypertension (4/16/2021)  BP low at times, monitor        Atelectasis, bilateral (4/20/2021)  IS     Constipation  Will add lactulose prn    Dispo  Likely home with [facility] in AM       [place] Traci, ANH

## 2021-04-22 NOTE — PROGRESS NOTES
ACUTE PHYSICAL THERAPY GOALS:  (Developed with and agreed upon by patient and/or caregiver.)  1. Mr. Elizabeth Ibarra will perform supine to sit and sit to supine independently in 7 days. 2.  Mr. Elizabeth Ibarra will perform sit to stand and bed to chair independently in 7 days. 3.  Mr. Elizabeth Ibarra will perform gait >1000 ft independently in 7 days. 4.  Mr. Elizabeth Ibarra will go up and down 3 steps with rail independently in 7 days. 5.  Mr. Elizabeth Ibarra will perform therex x 25 reps in sitting and standing in 7 days.          PHYSICAL THERAPY: Daily Note and PM Treatment Day # 3    Kelin Warren is a 61 y.o. male   PRIMARY DIAGNOSIS: S/P CABG x 3  STEMI (ST elevation myocardial infarction) ([facility] [address] [I21.3]  Procedure(s) (LRB):  CORONARY ARTERY BYPASS GRAFT (CABG X3) (N/A)  STERNOTOMY REDO (N/A)  3 Days Post-Op    ASSESSMENT:     REHAB RECOMMENDATIONS: CURRENT LEVEL OF FUNCTION:  (Most Recently Demonstrated)   Recommendation to date pending progress:  [address] [place] [address] Therapy  Equipment:    To Be Determined Bed Mobility:   Not tested  Sit to Stand:   Standby Assistance  Transfers:   Standby Assistance  Gait/Mobility:   Standby Assistance     ASSESSMENT:  Mr. Elizabeth Ibarra is sitting in the recliner and feeling better today. Patient is on RA. Sit to stand with stand by assist.    Gait x 200 feet with slow yolis no AD. Patient is returned to the recliner and after a short rest break performed  therapeutic exercises. Doing well. Making good progress. Left in recliner with needs within reach. Home at [address] Will continue PT efforts. PM note:  Patient is agreeable to therapy. Maintained gait distance and performed exercises. Tolerated well. Making progress towards goals. Pleasant and cooperative. Possible discharge tomorrow.        SUBJECTIVE:   Mr. Elizabeth Ibarra states, \"OK\"    SOCIAL HISTORY/ LIVING ENVIRONMENT:  sister will be staying with him  Home Environment: Private residence  [address]: Two story  Living Alone: Yes  Support Systems: Family member(s)  OBJECTIVE:     PAIN: VITAL SIGNS: LINES/DRAINS:   Pre Treatment: Pain Screen  Pain Scale 1: Numeric (0 - 10)  Pain Intensity 1: 00/10  Post Treatment: no number given   none  O2 Device: None (Room air)     MOBILITY: I Mod I S SBA CGA Min Mod Max Total  NT x2 Comments:   Bed Mobility    Rolling [] [] [] [] [] [] [] [] [] [x] []    Supine to Sit [] [] [] [] [] [] [] [] [] [x] []    Scooting [] [] [] [] [] [] [] [] [] [x] []    Sit to Supine [] [] [] [] [] [] [] [] [] [x] []    Transfers    Sit to Stand [] [] [x] [] [] [] [] [] [] [] []    Bed to Chair [] [] [] [] [] [] [] [] [] [x] []    Stand to Sit [] [] [x] [] [] [] [] [] [] [] []    I=Independent, Mod I=Modified Independent, S=Supervision, SBA=Standby Assistance, CGA=Contact Guard Assistance,   Min=Minimal Assistance, Mod=Moderate Assistance, Max=Maximal Assistance, Total=Total Assistance, NT=Not Tested    BALANCE: Good Fair+ Fair Fair- Poor NT Comments   Sitting Static [x] [] [] [] [] []    Sitting Dynamic [x] [] [] [] [] []              Standing Static [x] [] [] [] [] []    Standing Dynamic [x] [] [] [] [] []      GAIT: I Mod I S SBA CGA Min Mod Max Total  NT x2 Comments:   Level of Assistance [] [] [] [] [x] [] [] [] [] [] []    Distance 200 feet     DME Rolling Walker    Gait Quality Slow yolis    Weightbearing  Status N/A     I=Independent, Mod I=Modified Independent, S=Supervision, SBA=Standby Assistance, CGA=Contact Guard Assistance,   Min=Minimal Assistance, Mod=Moderate Assistance, Max=Maximal Assistance, Total=Total Assistance, NT=Not Tested    PLAN:   FREQUENCY/DURATION: PT Plan of Care: BID for duration of hospital stay or until stated goals are met, whichever comes first.  TREATMENT:     TREATMENT:   ($$ Therapeutic Activity: 8-22 mins  $$ Therapeutic Exercises: 8-22 mins    )  Therapeutic Activity (18 Minutes):  Therapeutic activity included Transfer Training, Ambulation on level ground, Sitting balance and Standing balance to improve functional Mobility, Strength and Activity tolerance. Therapeutic Exercise (0 Minutes): Therapeutic exercises noted below to improve functional activity tolerance, strength and mobility.      TREATMENT GRID:     Date:  04/21/21 Date:  04/22/21 Date:     ACTIVITY/EXERCISE AM PM AM PM AM PM   LAQ 15  15 15     Shoulder shrugs 15  15 15     Gluteal sets 15  15 15     marching 15  15 15     Ankle pumps 15  15 15     abduction 15  15 15              B = bilateral; AA = active assistive; A = active; P = passive    AFTER TREATMENT POSITION/PRECAUTIONS:  Chair, Needs within reach and RN notified    INTERDISCIPLINARY COLLABORATION:  RN/PCT and PT/PTA    TOTAL TREATMENT DURATION:  PT Patient Time In/Time Out  Time In: 1414  Time Out: 1432    Romie Irving PTA

## 2021-04-22 NOTE — PROGRESS NOTES
ACUTE PHYSICAL THERAPY GOALS:  (Developed with and agreed upon by patient and/or caregiver.)  1. Mr. Marlene Manzo will perform supine to sit and sit to supine independently in 7 days. 2.  Mr. Marlene Manzo will perform sit to stand and bed to chair independently in 7 days. 3.  Mr. Marlene Manzo will perform gait >1000 ft independently in 7 days. 4.  Mr. Marlene Manzo will go up and down 3 steps with rail independently in 7 days. 5.  Mr. Marlene Manzo will perform therex x 25 reps in sitting and standing in 7 days.          PHYSICAL THERAPY: Daily Note and AM Treatment Day # 3    Mey Messer is a 61 y.o. male   PRIMARY DIAGNOSIS: S/P CABG x 3  STEMI (ST elevation myocardial infarction) ([facility] [address] [I21.3]  Procedure(s) (LRB):  CORONARY ARTERY BYPASS GRAFT (CABG X3) (N/A)  STERNOTOMY REDO (N/A)  3 Days Post-Op    ASSESSMENT:     REHAB RECOMMENDATIONS: CURRENT LEVEL OF FUNCTION:  (Most Recently Demonstrated)   Recommendation to date pending progress:  [address] [place] [address] Therapy  Equipment:    To Be Determined Bed Mobility:   Not tested  Sit to Stand:   Standby Assistance  Transfers:   Standby Assistance  Gait/Mobility:   Standby Assistance     ASSESSMENT:  Mr. Marlene Manzo is sitting in the recliner and feeling better today. Patient is on RA. Sit to stand with stand by assist.    Gait x 200 feet with slow yolis no AD. Patient is returned to the recliner and after a short rest break performed  therapeutic exercises. Doing well. Making good progress. Left in recliner with needs within reach. Home at [address] Will continue PT efforts.      SUBJECTIVE:   Mr. Marlene Manzo states, \"I' ready\"    SOCIAL HISTORY/ LIVING ENVIRONMENT:  sister will be staying with him  Home Environment: Private residence  One/Two Story Residence: Two story  Living Alone: Yes  Support Systems: Family member(s)  OBJECTIVE:     PAIN: VITAL SIGNS: LINES/DRAINS:   Pre Treatment: Pain Screen  Pain Scale 1: Numeric (0 - 10)  Pain Intensity 1: 00/10  Post Treatment: no number given   none  O2 Device: None (Room air)     MOBILITY: I Mod I S SBA CGA Min Mod Max Total  NT x2 Comments:   Bed Mobility    Rolling [] [] [] [] [] [] [] [] [] [x] []    Supine to Sit [] [] [] [] [] [] [] [] [] [x] []    Scooting [] [] [] [] [] [] [] [] [] [x] []    Sit to Supine [] [] [] [] [] [] [] [] [] [x] []    Transfers    Sit to Stand [] [] [x] [] [] [] [] [] [] [] []    Bed to Chair [] [] [] [] [] [] [] [] [] [x] []    Stand to Sit [] [] [x] [] [] [] [] [] [] [] []    I=Independent, Mod I=Modified Independent, S=Supervision, SBA=Standby Assistance, CGA=Contact Guard Assistance,   Min=Minimal Assistance, Mod=Moderate Assistance, Max=Maximal Assistance, Total=Total Assistance, NT=Not Tested    BALANCE: Good Fair+ Fair Fair- Poor NT Comments   Sitting Static [x] [] [] [] [] []    Sitting Dynamic [x] [] [] [] [] []              Standing Static [x] [] [] [] [] []    Standing Dynamic [x] [] [] [] [] []      GAIT: I Mod I S SBA CGA Min Mod Max Total  NT x2 Comments:   Level of Assistance [] [] [] [] [x] [] [] [] [] [] []    Distance 200 feet     DME Rolling Walker    Gait Quality Slow yolis    Weightbearing  Status N/A     I=Independent, Mod I=Modified Independent, S=Supervision, SBA=Standby Assistance, CGA=Contact Guard Assistance,   Min=Minimal Assistance, Mod=Moderate Assistance, Max=Maximal Assistance, Total=Total Assistance, NT=Not Tested    PLAN:   FREQUENCY/DURATION: PT Plan of Care: BID for duration of hospital stay or until stated goals are met, whichever comes first.  TREATMENT:     TREATMENT:   ($$ Therapeutic Activity: 8-22 mins  $$ Therapeutic Exercises: 8-22 mins    )  Therapeutic Activity (13 Minutes): Therapeutic activity included Transfer Training, Ambulation on level ground, Sitting balance  and Standing balance to improve functional Mobility, Strength and Activity tolerance. Therapeutic Exercise (10 Minutes):  Therapeutic exercises noted below to improve functional activity tolerance, strength and mobility.      TREATMENT GRID:     Date:  04/21/21 Date:  04/22/21 Date:     ACTIVITY/EXERCISE AM PM AM PM AM PM   LAQ 15  15      Shoulder shrugs 15  15      Gluteal sets 15  15      marching 15  15      Ankle pumps 15  15      abduction 15  15               B = bilateral; AA = active assistive; A = active; P = passive    AFTER TREATMENT POSITION/PRECAUTIONS:  Chair, Needs within reach and RN notified    INTERDISCIPLINARY COLLABORATION:  RN/PCT and PT/PTA    TOTAL TREATMENT DURATION:  PT Patient Time In/Time Out  Time In: 0901  Time Out: 6793    Avery Howell PTA

## 2021-04-22 NOTE — PROGRESS NOTES
Bedside shift change report given to Colleen Talbert RN (oncoming nurse) by Guy Olivarez RN (offgoing nurse). Report included the following information SBAR, Kardex, OR Summary, Intake/Output, MAR, Recent Results and Cardiac Rhythm NSR.

## 2021-04-22 NOTE — PROGRESS NOTES
4/22/2021 12:40 PM    Admit Date: 4/16/2021    Admit Diagnosis: STEMI (ST elevation myocardial infarction) ([facility] [I21.3]      Subjective:   No sob- post op cp appropriate      Objective:      Visit Vitals  BP (!) 94/55 (BP 1 Location: Left upper arm, BP Patient Position: At rest)   Pulse 75   Temp 98.4 °F (36.9 °C)   Resp 18   Ht 6' (1.829 m)   Wt 238 lb 3.2 oz (108 kg)   SpO2 91%   BMI 32.31 kg/m²       Physical Exam:  [facility], Well Nourished, No Acute Distress, Alert & Oriented x 3, appropriate mood. Neck- supple, no JVD  CV- regular rate and rhythm no MRG  Lung- clear bilaterally  Abd- soft, nontender, nondistended  Ext- no edema bilaterally. Skin- warm and dry        Data Review:   Recent Labs     04/22/21  0500 04/21/21  0449 04/20/21  0309 04/19/21  1322 04/19/21  1322   NA  --   --  141  --  141   K 4.0 3.6 4.2   < > 4.4   BUN  --   --  13  --  12   CREA  --   --  0.64*  --  0.76*   WBC  --  8.6 7.6  --  12.0*   HGB  --  10.4* 10.2*   < > 11.0*   HCT  --  32.5* 31.4*   < > 34.4*   PLT  --  199 191  --  195   INR  --   --   --   --  1.3    < > = values in this interval not displayed. Assessment/Plan:     Principal Problem:    S/P CABG x 3 (4/19/2021)Improved with current therapy. Will continue medications  OO, ambulate and IS   Active Problems:    CAD (coronary artery disease), native coronary artery (12/18/2009)      Overview: MI age 27 had bypass surgery and stents (1995). EF 32% Dr. Juan Carlos Rodriguez On       disability. EF 2016 39%. Had very limited ex capacity on stress test last year but had been doing       better until this recent change. (7.30.0531)      Ischemic cardiomyopathy (9/19/2016)      STEMI (ST elevation myocardial infarction) ([address] (4/16/2021)      Hypertension (4/16/2021)Stable. Continue current medical therapy.         Atelectasis, bilateral (4/20/2021)

## 2021-04-22 NOTE — PROGRESS NOTES
Problem: CABG: Post-Op Day 2/Transfer Day  Goal: Activity/Safety  Outcome: Progressing Towards Goal  Goal: Consults, if ordered  Outcome: Progressing Towards Goal  Goal: Diagnostic Test/Procedures  Outcome: Progressing Towards Goal  Goal: Nutrition/Diet  Outcome: Progressing Towards Goal  Goal: Medications  Outcome: Progressing Towards Goal  Goal: Discharge Planning  Outcome: Progressing Towards Goal  Goal: Respiratory  Outcome: Progressing Towards Goal  Goal: Treatments/Interventions/Procedures  Outcome: Progressing Towards Goal  Goal: Psychosocial  Outcome: Progressing Towards Goal  Goal: *Hemodynamically stable without vasoactive medications  Outcome: Progressing Towards Goal  Goal: *Lungs clear or at baseline  Outcome: Progressing Towards Goal  Goal: *Optimal pain control at patient's stated goal  Outcome: Progressing Towards Goal  Goal: *Demonstrates progressive activity  Outcome: Progressing Towards Goal  Goal: *Tolerating diet  Outcome: Progressing Towards Goal

## 2021-04-22 NOTE — PROGRESS NOTES
Problem: Falls - Risk of  Goal: *Absence of Falls  Description: Document Vandana Wan Fall Risk and appropriate interventions in the flowsheet.   Outcome: Progressing Towards Goal  Note: Fall Risk Interventions:  Mobility Interventions: Bed/chair exit alarm, Communicate number of staff needed for ambulation/transfer, Patient to call before getting OOB         Medication Interventions: Bed/chair exit alarm, Patient to call before getting OOB, Teach patient to arise slowly    Elimination Interventions: Bed/chair exit alarm, Call light in reach, Patient to call for help with toileting needs, Toilet paper/wipes in reach, Toileting schedule/hourly rounds, Stay With Me (per policy), Urinal in reach

## 2021-04-22 NOTE — DISCHARGE SUMMARY
Discharge Summary     Patient ID:  Derloy Mckeon  [id number]  86 y.o.  1965    Admit date: 4/16/2021    Discharge date:  4/23/2021    Admitting Physician: Kaylen Amor MD     Discharge Physician: MARGARETTE Miller/Dr. Farhat Ang    Admission Diagnoses: STEMI (ST elevation myocardial infarction) [facility]) [I21.3]    Discharge Diagnoses:   Patient Active Problem List    Diagnosis Date Noted    Atelectasis, bilateral 04/20/2021    S/P CABG x 3 04/19/2021    STEMI (ST elevation myocardial infarction) ([address] 04/16/2021    Hypertension 04/16/2021    Severe obesity ([address] 08/26/2020    Vasculogenic erectile dysfunction 02/13/2017    Ischemic cardiomyopathy 09/19/2016    Fatigue 07/01/2016    Chest pain, unspecified 06/06/2016    Chronic fatigue 06/06/2016    Unstale Angina Acute coronary syndrome ([address] 04/29/2016    Dizziness/ Vertigo 04/29/2016    Chronic ischemic Cardiomyopathy/heart disease 04/29/2016    Chest pain 04/29/2016    Myocardial infarction, anterior wall/, subsequent ([address] 04/29/2016    Cervical spine pain 04/18/2016    Chronic systolic heart failure ([address] 12/19/2009    CAD (coronary artery disease), native coronary artery 12/18/2009    S/P PTCA (percutaneous transluminal coronary angioplasty) 12/18/2009    S/P CABG x 1 12/18/2009    Dyslipidemia 12/18/2009    Hypertensive heart disease with heart failure ([address] 12/18/2009       Procedures this admission:  Diagnostic left heart catheterization  EchoCardiogram  Coronary Artery Bypass Graft Surgery   Consults: Cardiac Surgery, Pulmonary/Intensive Care     Hospital Course: The patient is a 61 y.o. male who presented to the ER via EMS as a STEMI. He initially presented to urgent care with chest pain. He stated chest pain started on Monday. He attributed symptoms to GERD and did not think it was his heart. He also had an intermittent fever and just didn't feel right. He had a COVID test which was negative.  He continued to have intermittent chest discomfort. He called [facility] who recommended either urgent care or ER for evaluation given intermittent fevers. The morning of 4/16, he had more severe chest pain. He presented to urgent care where EKG showed inferior ST elevation. EMS was summoned and he was transported to [facility]. He underwent emergent cardiac catheterization that showed severe LM stenosis as well as high grade stenosis in the LCx and RCA. His LIMA to LAD was patent. He had off pump minimally invasive LIMA to LAD in 1995 in setting of MI and has had PCI since. Echo in 9/20 showed reduced LV EF at 35-40%. He was admitted to the CVICU. Repeat echocardiogram showed EF 20-25%. Redo CABG surgery was planned. He underwent Redo sternotomy, CABG X 3 with reverse SVG to the PDA and a sequential SVG to the diagonal and OM on 4/19/21. He did well post operatively and was transferred to  stepdown on POD 1. He was weaned off of O2. He progressed well with PT. He remained in sinus rhythm. Repeat limited echo on 4/22 showed EF 30%. The morning of 4/23/21, patient was feeling well and ambulating without any symptoms. Patient was determined stable and ready for discharge. Patient was instructed on the importance of medication compliance and outpatient follow up. For maximized medical therapy for CAD, patient will continue ASA, BB, and statin as well. For systolic CHF, he will continue BB. ARB was not resumed due to borderline low BPs. This can likely be resumed on outpatient basis. *BEREKET Marmolejo was discussed with patient and offered but he refuses at this time. He is concerned that his equipment at work would interfere with it and he believes his EF will improve after surgery. The patient will have close transitional care follow up with [facility] Dr. Zeus Tan on May 7th at 2:15pm [facility]).    The patient will follow up with Dr. Bhavesh Gutiérrez on May 11th at 2:40pm and has been referred to cardiac rehab.    DISPOSITION: The patient is being discharged home with home health services in stable condition on a low saturated fat, low cholesterol and low salt diet. The patient is instructed to advance activities as tolerated to the limit of fatigue or shortness of breath. The patient is instructed to avoid all heavy lifting or activities that strain the chest or upper arm muscles. Strenuous activity should be avoided. The patient is instructed to watch for signs of infection which include: increasing area of redness, fever or purulent drainage at the incision site. The patient is instructed to call the office or return to the ER for immediate evaluation for any shortness of breath or chest pain not relieved by NTG. Discharge Exam:   Visit Vitals  /78 (BP 1 Location: Left upper arm, BP Patient Position: At rest)   Pulse 76   Temp 98.4 °F (36.9 °C)   Resp 16   Ht 6' (1.829 m)   Wt 235 lb 9.6 oz (106.9 kg)   SpO2 93%   BMI 31.95 kg/m²         Physical Exam:  General: Well Developed, Well Nourished, No Acute Distress, Alert & Oriented  Neck: supple, no JVD  Heart: S1S2 with RRR without murmurs or gallops  Lungs: Clear throughout auscultation bilaterally without adventitious sounds  Abd: soft, nontender, nondistended, with good bowel sounds  Ext: warm, no edema  Sternal incision: clean, dry, and intact  Skin: warm and dry      Recent Results (from the past 24 hour(s))   MAGNESIUM    Collection Time: 04/23/21  3:39 AM   Result Value Ref Range    Magnesium 2.7 (H) 1.8 - 2.4 mg/dL   POTASSIUM    Collection Time: 04/23/21  3:39 AM   Result Value Ref Range    Potassium 4.4 3.5 - 5.1 mmol/L         Patient Instructions:   Current Discharge Medication List      START taking these medications    Details   acetaminophen (TYLENOL) 325 mg tablet Take 2 Tabs by mouth every four (4) hours as needed for Pain. Qty:        carvediloL (COREG) 6.25 mg tablet Take 1 Tab by mouth every twelve (12) hours.   Qty: 60 Tab, Refills: 2 oxyCODONE-acetaminophen (PERCOCET) 5-325 mg per tablet Take 1 Tab by mouth every six (6) hours as needed for Pain for up to 5 days. Max Daily Amount: 4 Tabs. Qty: 20 Tab, Refills: 0    Associated Diagnoses: S/P CABG x 3         CONTINUE these medications which have NOT CHANGED    Details   atorvastatin (Lipitor) 80 mg tablet Take 1 Tab by mouth daily. Qty: 80 Tab, Refills: 3    Associated Diagnoses: Coronary artery disease of native artery of native heart with stable angina pectoris (HCC)      aspirin delayed-release 81 mg tablet Take  by mouth daily. sildenafil citrate (Viagra) 50 mg tablet Take 50 mg by mouth as needed for Erectile Dysfunction. nitroglycerin (NITROSTAT) 0.4 mg SL tablet 1 Tab by SubLINGual route every five (5) minutes as needed (If no relief after 3rd tab call 911). Qty: 100 Tab, Refills: 0    Associated Diagnoses: Coronary artery disease of native artery of native heart with stable angina pectoris (HCC)      multivitamin capsule Take 1 Cap by mouth daily.            STOP taking these medications       nebivoloL (BYSTOLIC) 2.5 mg tablet Comments:   Reason for Stopping:         losartan (COZAAR) 25 mg tablet Comments:   Reason for Stopping:               Signed:  Beckie Magallanes PA-C  4/23/2021

## 2021-04-23 VITALS
WEIGHT: 235.6 LBS | HEART RATE: 77 BPM | TEMPERATURE: 98 F | SYSTOLIC BLOOD PRESSURE: 108 MMHG | RESPIRATION RATE: 18 BRPM | HEIGHT: 72 IN | DIASTOLIC BLOOD PRESSURE: 73 MMHG | BODY MASS INDEX: 31.91 KG/M2 | OXYGEN SATURATION: 98 %

## 2021-04-23 LAB
MAGNESIUM SERPL-MCNC: 2.7 MG/DL (ref 1.8–2.4)
POTASSIUM SERPL-SCNC: 4.4 MMOL/L (ref 3.5–5.1)

## 2021-04-23 PROCEDURE — 99024 POSTOP FOLLOW-UP VISIT: CPT | Performed by: PHYSICIAN ASSISTANT

## 2021-04-23 PROCEDURE — 84132 ASSAY OF SERUM POTASSIUM: CPT

## 2021-04-23 PROCEDURE — 83735 ASSAY OF MAGNESIUM: CPT

## 2021-04-23 PROCEDURE — 99231 SBSQ HOSP IP/OBS SF/LOW 25: CPT | Performed by: INTERNAL MEDICINE

## 2021-04-23 PROCEDURE — 74011250637 HC RX REV CODE- 250/637: Performed by: PHYSICIAN ASSISTANT

## 2021-04-23 PROCEDURE — 36415 COLL VENOUS BLD VENIPUNCTURE: CPT

## 2021-04-23 PROCEDURE — 77030012890

## 2021-04-23 PROCEDURE — 74011250637 HC RX REV CODE- 250/637: Performed by: THORACIC SURGERY (CARDIOTHORACIC VASCULAR SURGERY)

## 2021-04-23 PROCEDURE — 2709999900 HC NON-CHARGEABLE SUPPLY

## 2021-04-23 RX ORDER — ACETAMINOPHEN 325 MG/1
650 TABLET ORAL
Status: SHIPPED | COMMUNITY
Start: 2021-04-23

## 2021-04-23 RX ORDER — OXYCODONE AND ACETAMINOPHEN 5; 325 MG/1; MG/1
1 TABLET ORAL
Qty: 20 TAB | Refills: 0 | Status: SHIPPED | OUTPATIENT
Start: 2021-04-23 | End: 2021-04-28

## 2021-04-23 RX ORDER — CARVEDILOL 6.25 MG/1
6.25 TABLET ORAL EVERY 12 HOURS
Qty: 60 TAB | Refills: 2 | Status: SHIPPED | OUTPATIENT
Start: 2021-04-23 | End: 2021-08-24

## 2021-04-23 RX ADMIN — POTASSIUM CHLORIDE 10 MEQ: 750 TABLET, EXTENDED RELEASE ORAL at 09:46

## 2021-04-23 RX ADMIN — CARVEDILOL 6.25 MG: 6.25 TABLET, FILM COATED ORAL at 09:45

## 2021-04-23 RX ADMIN — ASPIRIN 81 MG: 81 TABLET ORAL at 09:46

## 2021-04-23 RX ADMIN — FUROSEMIDE 40 MG: 40 TABLET ORAL at 09:45

## 2021-04-23 RX ADMIN — Medication 1 AMPULE: at 09:46

## 2021-04-23 RX ADMIN — SENNOSIDES AND DOCUSATE SODIUM 2 TABLET: 8.6; 5 TABLET ORAL at 09:46

## 2021-04-23 RX ADMIN — Medication 10 ML: at 05:45

## 2021-04-23 RX ADMIN — OXYCODONE HYDROCHLORIDE AND ACETAMINOPHEN 1 TABLET: 5; 325 TABLET ORAL at 11:05

## 2021-04-23 RX ADMIN — FAMOTIDINE 20 MG: 20 TABLET, FILM COATED ORAL at 09:46

## 2021-04-23 RX ADMIN — AMIODARONE HYDROCHLORIDE 200 MG: 200 TABLET ORAL at 09:46

## 2021-04-23 NOTE — PROGRESS NOTES
Pt with discharge orders this day. Pt to return home with spouse and home health. Referral made to [facility] and updated clinicals faxed this day. No additional CM needs at discharge. Milestones met. Care Management Interventions  PCP Verified by CM: Yes  Mode of Transport at Discharge:  Other (see comment)(family)  Transition of Care Consult (CM Consult): Discharge Planning, Home Health  Discharge Durable Medical Equipment: No  Physical Therapy Consult: Yes  Occupational Therapy Consult: No  Speech Therapy Consult: No  Current Support Network: Own Home, Lives Alone  Confirm Follow Up Transport: Family  The Plan for Transition of Care is Related to the Following Treatment Goals : home with home health   The Patient and/or Patient Representative was Provided with a Choice of Provider and Agrees with the Discharge Plan?: Yes  Name of the Patient Representative Who was Provided with a Choice of Provider and Agrees with the Discharge Plan: Mr. Yadi Dominguez of Choice List was Provided with Basic Dialogue that Supports the Patient's Individualized Plan of Care/Goals, Treatment Preferences and Shares the Quality Data Associated with the Providers?: Yes   Resource Information Provided?: No  Discharge Location  Discharge Placement: Home with home health

## 2021-04-23 NOTE — PROGRESS NOTES
Problem: Falls - Risk of  Goal: *Absence of Falls  Description: Document Donald Owen Fall Risk and appropriate interventions in the flowsheet. Outcome: Progressing Towards Goal  Note: Fall Risk Interventions:  Mobility Interventions: Patient to call before getting OOB         Medication Interventions: Teach patient to arise slowly    Elimination Interventions: Urinal in reach              Problem: Patient Education: Go to Patient Education Activity  Goal: Patient/Family Education  Outcome: Progressing Towards Goal     Problem: Pressure Injury - Risk of  Goal: *Prevention of pressure injury  Description: Document Aaron Scale and appropriate interventions in the flowsheet.   Outcome: Progressing Towards Goal  Note: Pressure Injury Interventions:  Sensory Interventions: Assess changes in LOC         Activity Interventions: Increase time out of bed, PT/OT evaluation    Mobility Interventions: Pressure redistribution bed/mattress (bed type), PT/OT evaluation, HOB 30 degrees or less    Nutrition Interventions: Document food/fluid/supplement intake    Friction and Shear Interventions: HOB 30 degrees or less, Minimize layers, Apply protective barrier, creams and emollients, Foam dressings/transparent film/skin sealants                Problem: Patient Education: Go to Patient Education Activity  Goal: Patient/Family Education  Outcome: Progressing Towards Goal

## 2021-04-23 NOTE — PROGRESS NOTES
4/23/2021 8:53 AM    Admit Date: 4/16/2021    Admit Diagnosis: STEMI (ST elevation myocardial infarction) ([facility] [address] [I21.3]      Subjective:   No cp or sob      Objective:      Visit Vitals  /78 (BP 1 Location: Left upper arm, BP Patient Position: At rest)   Pulse 76   Temp 98.4 °F (36.9 °C)   Resp 16   Ht 6' (1.829 m)   Wt 235 lb 9.6 oz (106.9 kg)   SpO2 93%   BMI 31.95 kg/m²       Physical Exam:  Simon Chew, Well Nourished, No Acute Distress, Alert & Oriented x 3, appropriate mood. Neck- supple, no JVD  CV- regular rate and rhythm no MRG  Lung- clear bilaterally  Abd- soft, nontender, nondistended  Ext- no edema bilaterally. Skin- warm and dry        Data Review:   Recent Labs     04/23/21  0339 04/21/21  0449 04/21/21  0449   K 4.4   < > 3.6   WBC  --   --  8.6   HGB  --   --  10.4*   HCT  --   --  32.5*   PLT  --   --  199    < > = values in this interval not displayed. Assessment/Plan:     Principal Problem:    S/P CABG x 3 (4/19/2021)Stable. Continue current medical therapy. On appropriate meds- add ace/arb as OP as bp tolerates    Active Problems:    CAD (coronary artery disease), native coronary artery (12/18/2009)      Overview: MI age 27 had bypass surgery and stents (1995). EF 32% Dr. Marcell Morris. On       disability. EF 2016 39%. Had very limited ex capacity on stress test last year but had been doing       better until this recent change.  ([phone])      Ischemic cardiomyopathy (9/19/2016)      STEMI (ST elevation myocardial infarction) ([facility] [address] (4/16/2021)      Hypertension (4/16/2021)      Atelectasis, bilateral (4/20/2021)

## 2021-04-23 NOTE — DISCHARGE INSTRUCTIONS
DISCHARGE SUMMARY from Nurse    PATIENT INSTRUCTIONS:    Report the following to your surgeon:  · Excessive pain, swelling, redness or odor of or around the surgical area  · Temperature over 100.5  · Nausea and vomiting lasting longer than 4 hours or if unable to take medications  · Any signs of decreased circulation or nerve impairment to extremity: change in color, persistent  numbness, tingling, coldness or increase pain    What to do at Home:    *  Please give a list of your current medications to your Primary Care Provider. *  Please update this list whenever your medications are discontinued, doses are      changed, or new medications (including over-the-counter products) are added. *  Please carry medication information at all times in case of emergency situations. These are general instructions for a healthy lifestyle:    No smoking/ No tobacco products/ Avoid exposure to second hand smoke  Surgeon General's Warning:  Quitting smoking now greatly reduces serious risk to your health. Obesity, smoking, and sedentary lifestyle greatly increases your risk for illness    A healthy diet, regular physical exercise & weight monitoring are important for maintaining a healthy lifestyle    You may be retaining fluid if you have a history of heart failure or if you experience any of the following symptoms:  Weight gain of 3 pounds or more overnight or 5 pounds in a week, increased swelling in our hands or feet or shortness of breath while lying flat in bed. Please call your doctor as soon as you notice any of these symptoms; do not wait until your next office visit. The discharge information has been reviewed with the patient and caregiver. The patient and caregiver verbalized understanding.   Discharge medications reviewed with the patient and caregiver and appropriate educational materials and side effects teaching were provided. ___________________________________________________________________________________________________________________________________   Coronary Artery Bypass Graft: What to Expect at Home  Your Recovery     Coronary artery bypass graft (CABG) is surgery to treat coronary artery disease. The surgery helps blood make a detour, or bypass, around one or more narrowed or blocked coronary arteries. Coronary arteries are the blood vessels that bring blood to the heart. Your doctor did the surgery through a cut, called an incision, in your chest.  You will feel tired and sore for the first few weeks after surgery. You may have some brief, sharp pains on either side of your chest. Your chest, shoulders, and upper back may ache. The incision in your chest and the area where the healthy vein was taken may be sore or swollen. These symptoms usually get better after 4 to 6 weeks. You will probably be able to do many of your usual activities after 4 to 6 weeks. But for 2 to 3 months you will not be able to lift heavy objects or do activities that strain your chest or upper arm muscles. At first you may notice that you get tired easily and need to rest often. It may take 1 to 2 months to get your energy back. Some people find that they are more emotional after this surgery. You may cry easily or show emotion in ways that are unusual for you. This is common and may last for up to a year. Some people get depressed after the surgery. Talk with your doctor if you have sadness that continues or you are concerned about how you are feeling. Treatment and other support can help you feel better. Even though the surgery may improve your symptoms, you will still need to make changes in your lifestyle to lower your risk of a heart attack or stroke. It will be important to eat a heart-healthy diet, get regular exercise, not smoke, take your heart medicines, and reduce stress.   You will likely start a cardiac rehabilitation (rehab) program in the hospital. Britta Aaron will continue with this rehab program after you go home to help you recover and prevent problems with your heart. Talk to your doctor about whether rehab is right for you. This care sheet gives you a general idea about how long it will take for you to recover. But each person recovers at a different pace. Follow the steps below to get better as quickly as possible. How can you care for yourself at home? Activity    · Rest when you feel tired. Getting enough sleep will help you recover. Try to sleep on your back for 4 to 6 weeks while your breastbone (sternum) heals. This usually takes about 4 to 6 weeks.     · Try to walk each day. Start by walking a little more than you did the day before. Bit by bit, increase the amount you walk. Walking boosts blood flow and helps prevent pneumonia and constipation.     · Avoid strenuous activities, such as bicycle riding, jogging, weight lifting, or heavy aerobic exercise, until your doctor says it is okay.     · For 3 months, avoid activities that strain your chest or upper arm muscles. This includes pushing a  or vacuum, mopping floors, or swinging a golf club or tennis racquet.     · For 2 to 3 months, avoid lifting anything that would make you strain. This may include a child, heavy grocery bags and milk containers, a heavy briefcase or backpack, or cat litter or dog food bags.     · Hold a pillow firmly over your chest incision when you cough or take deep breaths. This will support your chest and reduce your pain.     · Do breathing exercises at home as instructed by your doctor. This will help prevent pneumonia.     · Ask your doctor when you can drive again.     · You will probably need to take 4 to 12 weeks off from work. It depends on the type of work you do and how you feel.     · You may shower as usual. Pat the incision dry.  Do not take a bath for the first 3 weeks, or until your doctor tells you it is okay.     · Do not swim or use a hot tub for at least 1 month, or until your doctor says it is okay.     · Ask your doctor when it is okay for you to have sex. Diet    · Eat a heart-healthy diet. If you have not been eating this way, talk to your doctor. You also may want to talk to a dietitian. A dietitian can help you learn about healthy foods.     · Drink plenty of fluids (unless your doctor tells you not to).     · You may notice that your bowel movements are not regular right after your surgery. This is common. Try to avoid constipation and straining with bowel movements. You may want to take a fiber supplement every day. If you have not had a bowel movement after a couple of days, ask your doctor about taking a mild laxative. Medicines    · Your doctor will tell you if and when you can restart your medicines. He or she will also give you instructions about taking any new medicines.     · If you take aspirin or some other blood thinner, ask your doctor if and when to start taking it again. Make sure that you understand exactly what your doctor wants you to do.     · Your doctor may give you medicines to prevent blood clots, keep your heartbeat steady, and lower your blood pressure and cholesterol. Take your medicines exactly as prescribed. Call your doctor if you think you are having a problem with your medicine.     · Be safe with medicines. Take pain medicines exactly as directed. ? If the doctor gave you a prescription medicine for pain, take it as prescribed. ? If you are not taking a prescription pain medicine, ask your doctor if you can take an over-the-counter medicine. ? Do not take aspirin, ibuprofen (Advil, Motrin), naproxen (Aleve), or other nonsteroidal anti-inflammatory drugs (NSAIDs) unless your doctor says it is okay.     · If you think your pain medicine is making you sick to your stomach:  ? Take your medicine after meals (unless your doctor has told you not to). ?  Ask your doctor for a different pain medicine.     · If your doctor prescribed antibiotics, take them as directed. Do not stop taking them just because you feel better. You need to take the full course of antibiotics. Incision care    · If you have strips of tape on the incisions the doctor made, leave the tape on for a week or until it falls off.     · Wash the area daily with warm, soapy water, and pat it dry. Don't use hydrogen peroxide or alcohol, which can slow healing. You may cover the area with a gauze bandage if it weeps or rubs against clothing. Change the bandage every day.     · Keep the area clean and dry.     · Do not use any creams, lotions, powders, ointments, or oils unless your doctor tells you it is okay.     · If you have an incision in your leg:  ? Wear support stockings on your legs during the day for the first 2 weeks. You can take the stockings off at night while you sleep. ? Raise your legs above the level of your heart whenever you lie down for the first 4 to 6 weeks. Other instructions    · Keep track of your weight. Weigh yourself every day at the same time of day, on the same scale, in the same amount of clothing. A sudden increase in weight can be a sign of a problem with your heart. Tell your doctor if you suddenly gain weight, such as 3 pounds or more in 2 to 3 days.     · Do not smoke. Smoking can make it harder for you to recover. And it will raise the chances of your arteries narrowing again. If you need help quitting, talk to your doctor about stop-smoking programs and medicines. These can increase your chances of quitting for good. Follow-up care is a key part of your treatment and safety. Be sure to make and go to all appointments, and call your doctor if you are having problems. It's also a good idea to know your test results and keep a list of the medicines you take. When should you call for help? Call 911 anytime you think you may need emergency care.  For example, call if:    · You passed out (lost consciousness).     · You have severe trouble breathing.     · You have sudden chest pain and shortness of breath, or you cough up blood.     · You have severe pain in your chest.     · You have symptoms of a heart attack. These may include:  ? Chest pain or pressure, or a strange feeling in the chest.  ? Sweating. ? Shortness of breath. ? Nausea or vomiting. ? Pain, pressure, or a strange feeling in the back, neck, jaw, or upper belly or in one or both shoulders or arms. ? Lightheadedness or sudden weakness. ? A fast or irregular heartbeat. After you call 911, the  may tell you to chew 1 adult-strength or 2 to 4 low-dose aspirin. Wait for an ambulance. Do not try to drive yourself.     · You have angina symptoms (such as chest pain or pressure) that do not go away with rest or are not getting better within 5 minutes after you take a dose of nitroglycerin. Call your doctor now or seek immediate medical care if:    · You have pain that does not get better after you take pain medicine.     · You have a fever over 100°F.     · You have loose stitches, or your incision comes open.     · Bright red blood has soaked through the bandage over your incision.     · You have signs of infection, such as:  ? Increased pain, swelling, warmth, or redness. ? Red streaks leading from the incision. ? Pus draining from the incision. ? Swollen lymph nodes in your neck, armpits, or groin. ? A fever.     · You have signs of a blood clot in a leg. If you had a vein removed from your leg, you may have tenderness and swelling while your leg heals. But signs of a blood clot may be in a different part of your leg and may include:  ? Pain in your calf, back of the knee, thigh, or groin. ? Redness and swelling in your leg or groin.     · Your heartbeat feels very fast or slow, skips beats, or flutters.     · You are dizzy or lightheaded, or you feel like you may faint.     · You have new or increased shortness of breath. Watch closely for changes in your health, and be sure to contact your doctor if:    · You gain weight suddenly, such as 3 pounds or more in 2 to 3 days.     · You have increased swelling in your legs, ankles, or feet.     · You have any concerns about your incision.     · You feel very sad or have other signs of depression, such as trouble sleeping or eating.     · You have questions about diet, exercise, quitting smoking, or stress reduction after surgery. Where can you learn more? Go to http://www.gray.com/  Enter F759 in the search box to learn more about \"Coronary Artery Bypass Graft: What to Expect at Home. \"  Current as of: August 31, 2020               Content Version: 12.8  © 2006-2021 [email or web address]. Care instructions adapted under license by [employer] (which disclaims liability or warranty for this information). If you have questions about a medical condition or this instruction, always ask your healthcare professional. [place] [address] any warranty or liability for your use of this information. Coronary Artery Disease: Care Instructions  Your Care Instructions     The heart is a muscle, and like any muscle, it needs blood to work well. Coronary artery disease occurs when the arteries that bring oxygen-rich blood to your heart have a buildup of plaque--deposits of fats and other substances. Plaque can reduce blood flow to the heart muscle. This can cause angina symptoms such as chest pain or pressure. A heart attack can happen if blood flow is completely blocked. You can do a lot to improve your health and prevent a heart attack. Eating healthy food, not smoking, getting regular exercise, and taking your medicine are the main things you can do every day to stay healthy. Follow-up care is a key part of your treatment and safety. Be sure to make and go to all appointments, and call your doctor if you are having problems.  It's also a good idea to know your test results and keep a list of the medicines you take. How can you care for yourself at home? Medicines    · Be safe with medicines. Take your medicines exactly as prescribed. Call your doctor if you think you are having a problem with your medicine. You will get more details on the specific medicines your doctor prescribes.     · You will take medicines that lower your risk of a heart attack and lower your risk of dying early from heart disease. These medicines include:  ? Angiotensin-converting enzyme (ACE) inhibitors or angiotensin II receptor blockers (ARBs). They lower blood pressure. ? Aspirin and other blood thinners. They prevent blood clots that could cause a heart attack. ? Beta-blockers. They lower the heart's workload. ? Statins and other cholesterol medicines. They lower cholesterol.     · If your doctor has given you nitroglycerin for angina symptoms (such as chest pain or pressure) keep it with you at all times. If you have symptoms, sit down and rest, and take the first dose of nitroglycerin as directed. If your symptoms get worse or are not getting better within 5 minutes, call 911 right away. Stay on the phone. The emergency  will give you further instructions.     · Do not take any over-the-counter medicines, vitamins, or herbal products without talking to your doctor first.   Lifestyle  Ask your doctor if a cardiac rehab program is right for you. Cardiac rehab can help you make lifestyle changes. In cardiac rehab, a team of health professionals provides education and support to help you make new, healthy habits.    · Do not smoke. Avoid secondhand smoke too. Smoking can increase your risk of a heart attack or stroke. If you need help quitting, talk to your doctor about stop-smoking programs and medicines. These can increase your chances of quitting for good.     · Eat heart-healthy foods.  These include vegetables, fruits, nuts, beans, lean meat, fish, and whole grains. Limit saturated fat, sodium, and alcohol. Limit drinks and foods with added sugar.     · If your doctor recommends it, get more exercise. Ask your doctor what level of exercise is safe for you. Walking is a good choice. Bit by bit, increase the amount you walk every day. Try for at least 30 minutes on most days of the week. You also may want to swim, bike, or do other activities.     · Stay at a healthy weight. Lose weight if you need to.     · Manage other health problems. These include diabetes, high blood pressure, and high cholesterol. If you think you may have a problem with alcohol or drug use, talk to your doctor.     · If you have angina symptoms, pay attention to your symptoms. This can help you see what causes them and what is typical for you.     · Avoid colds and flu. Get a pneumococcal vaccine shot. If you have had one before, ask your doctor whether you need another dose. Get a flu vaccine every year. If you must be around people with colds or flu, wash your hands often.     · If you think you have symptoms of depression, talk to your doctor. Symptoms include feeling sad or hopeless most of the time, or losing interest in activities that used to make you happy. When should you call for help? Call 911 anytime you think you may need emergency care. For example, call if:    · You have symptoms of a heart attack. These may include:  ? Chest pain or pressure, or a strange feeling in the chest.  ? Sweating. ? Shortness of breath. ? Nausea or vomiting. ? Pain, pressure, or a strange feeling in the back, neck, jaw, or upper belly or in one or both shoulders or arms. ? Lightheadedness or sudden weakness. ? A fast or irregular heartbeat. After you call 911, the  may tell you to chew 1 adult-strength or 2 to 4 low-dose aspirin. Wait for an ambulance.  Do not try to drive yourself.     · You have angina symptoms (such as chest pain or pressure) that do not go away with rest or are not getting better within 5 minutes after you take a dose of nitroglycerin.     · You passed out (lost consciousness). Call your doctor now or seek immediate medical care if:    · You are having angina symptoms, such as chest pain or pressure, more often than usual, or they are different or worse than usual.     · You have new or increased shortness of breath.     · You are dizzy or lightheaded, or you feel like you may faint. Watch closely for changes in your health, and be sure to contact your doctor if you have any problems. Where can you learn more? Go to http://www.gray.com/  Enter O479 in the search box to learn more about \"Coronary Artery Disease: Care Instructions. \"  Current as of: August 31, 2020               Content Version: 12.8  © 2006-2021 [employer]. Care instructions adapted under license by [employer] (which disclaims liability or warranty for this information). If you have questions about a medical condition or this instruction, always ask your healthcare professional. [address] any warranty or liability for your use of this information. Heart-Healthy Diet: Care Instructions  Your Care Instructions     A heart-healthy diet has lots of vegetables, fruits, nuts, beans, and whole grains, and is low in salt. It limits foods that are high in saturated fat, such as meats, cheeses, and fried foods. It may be hard to change your diet, but even small changes can lower your risk of heart attack and heart disease. Follow-up care is a key part of your treatment and safety. Be sure to make and go to all appointments, and call your doctor if you are having problems. It's also a good idea to know your test results and keep a list of the medicines you take. How can you care for yourself at home? Watch your portions  · Use food labels to learn what the recommended servings are for the foods you eat.   · Eat only the number of calories you need to stay at a healthy weight. If you need to lose weight, eat fewer calories than your body burns (through exercise and other physical activity). Eat more fruits and vegetables  · Eat a variety of fruit and vegetables every day. Dark green, deep orange, red, or yellow fruits and vegetables are especially good for you. Examples include spinach, carrots, peaches, and berries. · Keep carrots, celery, and other veggies handy for snacks. Buy fruit that is in season and store it where you can see it so that you will be tempted to eat it. · Cook dishes that have a lot of veggies in them, such as stir-fries and soups. Limit saturated fat  · Read food labels, and try to avoid saturated fats. They increase your risk of heart disease. · Use olive or canola oil when you cook. · Bake, broil, grill, or steam foods instead of frying them. · Choose lean meats instead of high-fat meats such as hot dogs and sausages. Cut off all visible fat when you prepare meat. · Eat fish, skinless poultry, and meat alternatives such as soy products instead of high-fat meats. Soy products, such as tofu, may be especially good for your heart. · Choose low-fat or fat-free milk and dairy products. Eat foods high in fiber  · Eat a variety of grain products every day. Include whole-grain foods that have lots of fiber and nutrients. Examples of whole-grain foods include oats, whole wheat bread, and brown rice. · Buy whole-grain breads and cereals, instead of white bread or pastries. Limit salt and sodium  · Limit how much salt and sodium you eat to help lower your blood pressure. · Taste food before you salt it. Add only a little salt when you think you need it. With time, your taste buds will adjust to less salt. · Eat fewer snack items, fast foods, and other high-salt, processed foods. Check food labels for the amount of sodium in packaged foods.   · Choose low-sodium versions of canned goods (such as soups, vegetables, and beans). Limit sugar  · Limit drinks and foods with added sugar. These include candy, desserts, and soda pop. Limit alcohol  · Limit alcohol to no more than 2 drinks a day for men and 1 drink a day for women. Too much alcohol can cause health problems. When should you call for help? Watch closely for changes in your health, and be sure to contact your doctor if:    · You would like help planning heart-healthy meals. Where can you learn more? Go to http://www.ac.com/  Enter V137 in the search box to learn more about \"Heart-Healthy Diet: Care Instructions. \"  Current as of: December 17, 2020               Content Version: 12.8  © 2006-2021 [email or web address]. Care instructions adapted under license by [employer] (which disclaims liability or warranty for this information). If you have questions about a medical condition or this instruction, always ask your healthcare professional. [address] any warranty or liability for your use of this information. Avoiding Triggers With Heart Failure: Care Instructions  Your Care Instructions     Triggers are anything that make your heart failure flare up. A flare-up is also called \"sudden heart failure\" or \"acute heart failure. \" When you have a flare-up, fluid builds up in your lungs, and you have problems breathing. You might need to go to the hospital. By watching for changes in your condition and avoiding triggers, you can prevent heart failure flare-ups. Follow-up care is a key part of your treatment and safety. Be sure to make and go to all appointments, and call your doctor if you are having problems. It's also a good idea to know your test results and keep a list of the medicines you take. How can you care for yourself at home? Watch for changes in your weight and condition  · Weigh yourself without clothing at the same time each day. Record your weight.  Call your doctor if you have sudden weight gain, such as more than 2 to 3 pounds in a day or 5 pounds in a week. (Your doctor may suggest a different range of weight gain.) A sudden weight gain may mean that your heart failure is getting worse. · Keep a daily record of your symptoms. Write down any changes in how you feel, such as new shortness of breath, cough, or problems eating. Also record if your ankles are more swollen than usual and if you feel more tired than usual. Note anything that you ate or did that could have triggered these changes. Limit sodium  Sodium causes your body to hold on to extra water. This may cause your heart failure symptoms to get worse. People get most of their sodium from processed foods. Fast food and restaurant meals also tend to be very high in sodium. · Your doctor may suggest that you limit sodium. Your doctor can tell you how much sodium is right for you. This includes limiting sodium in cooked and packaged foods. · Read food labels on cans and food packages. They tell you how much sodium you get in one serving. Check the serving size. If you eat more than one serving, you are getting more sodium. · Be aware that sodium can come in forms other than salt, including monosodium glutamate (MSG), sodium citrate, and sodium bicarbonate (baking soda). MSG is often added to Asian food. You can sometimes ask for food without MSG or salt. · Slowly reducing salt will help you adjust to the taste. Take the salt shaker off the table. · Flavor your food with garlic, lemon juice, onion, vinegar, herbs, and spices instead of salt. Do not use soy sauce, steak sauce, onion salt, garlic salt, mustard, or ketchup on your food, unless it is labeled \"low-sodium\" or \"low-salt. \"  · Make your own salad dressings, sauces, and ketchup without adding salt. · Use fresh or frozen ingredients, instead of canned ones, whenever you can. Choose low-sodium canned goods.   · Eat less processed food and food from restaurants, including fast food. Exercise as directed  Moderate, regular exercise is very good for your heart. It improves your blood flow and helps control your weight. But too much exercise can stress your heart and cause a heart failure flare-up. · Check with your doctor before you start an exercise program.  · Walking is an easy way to get exercise. Start out slowly. Gradually increase the length and pace of your walk. Swimming, riding a bike, and using a treadmill are also good forms of exercise. · When you exercise, watch for signs that your heart is working too hard. You are pushing yourself too hard if you cannot talk while you are exercising. If you become short of breath or dizzy or have chest pain, stop, sit down, and rest.  · Do not exercise when you do not feel well. Take medicines correctly  · Take your medicines exactly as prescribed. Call your doctor if you think you are having a problem with your medicine. · Make a list of all the medicines you take. Include those prescribed to you by other doctors and any over-the-counter medicines, vitamins, or supplements you take. Take this list with you when you go to any doctor. · Take your medicines at the same time every day. It may help you to post a list of all the medicines you take every day and what time of day you take them. · Make taking your medicine as simple as you can. Plan times to take your medicines when you are doing other things, such as eating a meal or getting ready for bed. This will make it easier to remember to take your medicines. · Get organized. Use helpful tools, such as daily or weekly pill containers. When should you call for help? Call 911 if you have symptoms of sudden heart failure such as:    · You have severe trouble breathing.     · You cough up pink, foamy mucus.     · You have a new irregular or rapid heartbeat.    Call your doctor now or seek immediate medical care if:    · You have new or increased shortness of breath.     · You are dizzy or lightheaded, or you feel like you may faint.     · You have sudden weight gain, such as more than 2 to 3 pounds in a day or 5 pounds in a week. (Your doctor may suggest a different range of weight gain.)     · You have increased swelling in your legs, ankles, or feet.     · You are suddenly so tired or weak that you cannot do your usual activities. Watch closely for changes in your health, and be sure to contact your doctor if you develop new symptoms. Where can you learn more? Go to http://www.gray.com/  Enter V089 in the search box to learn more about \"Avoiding Triggers With Heart Failure: Care Instructions. \"  Current as of: August 31, 2020               Content Version: 12.8  © 2006-2021 [employer]. Care instructions adapted under license by [employer] (which disclaims liability or warranty for this information). If you have questions about a medical condition or this instruction, always ask your healthcare professional. [address] any warranty or liability for your use of this information.

## 2021-06-22 PROBLEM — I21.3 STEMI (ST ELEVATION MYOCARDIAL INFARCTION) (HCC): Status: RESOLVED | Noted: 2021-04-16 | Resolved: 2021-06-22

## 2021-06-22 PROBLEM — Z95.1 S/P CABG X 3: Status: RESOLVED | Noted: 2021-04-19 | Resolved: 2021-06-22

## 2022-03-18 PROBLEM — E66.01 SEVERE OBESITY (HCC): Status: ACTIVE | Noted: 2020-08-26

## 2022-03-18 PROBLEM — Z95.1 S/P CABG X 3: Status: ACTIVE | Noted: 2021-04-19

## 2022-03-18 PROBLEM — J98.11 ATELECTASIS, BILATERAL: Status: ACTIVE | Noted: 2021-04-20

## 2022-03-19 PROBLEM — N52.9 VASCULOGENIC ERECTILE DYSFUNCTION: Status: ACTIVE | Noted: 2017-02-13

## 2022-03-20 PROBLEM — I10 HYPERTENSION: Status: ACTIVE | Noted: 2021-04-16

## 2022-06-30 ENCOUNTER — OFFICE VISIT (OUTPATIENT)
Dept: RHEUMATOLOGY | Age: 57
End: 2022-06-30
Payer: MEDICARE

## 2022-06-30 VITALS
WEIGHT: 245 LBS | HEART RATE: 84 BPM | BODY MASS INDEX: 33.18 KG/M2 | SYSTOLIC BLOOD PRESSURE: 144 MMHG | HEIGHT: 72 IN | DIASTOLIC BLOOD PRESSURE: 93 MMHG

## 2022-06-30 DIAGNOSIS — M50.30 DEGENERATION OF CERVICAL INTERVERTEBRAL DISC: Primary | ICD-10-CM

## 2022-06-30 DIAGNOSIS — M13.0 POLYARTHRITIS: ICD-10-CM

## 2022-06-30 DIAGNOSIS — L91.0 KELOID SCAR: ICD-10-CM

## 2022-06-30 PROCEDURE — G8417 CALC BMI ABV UP PARAM F/U: HCPCS | Performed by: INTERNAL MEDICINE

## 2022-06-30 PROCEDURE — 99204 OFFICE O/P NEW MOD 45 MIN: CPT | Performed by: INTERNAL MEDICINE

## 2022-06-30 PROCEDURE — G8427 DOCREV CUR MEDS BY ELIG CLIN: HCPCS | Performed by: INTERNAL MEDICINE

## 2022-06-30 RX ORDER — SACUBITRIL AND VALSARTAN 97; 103 MG/1; MG/1
1 TABLET, FILM COATED ORAL 2 TIMES DAILY
COMMUNITY

## 2022-06-30 RX ORDER — MELOXICAM 7.5 MG/1
TABLET ORAL
Qty: 30 TABLET | Refills: 0 | Status: SHIPPED | OUTPATIENT
Start: 2022-06-30

## 2022-06-30 ASSESSMENT — ROUTINE ASSESSMENT OF PATIENT INDEX DATA (RAPID3)
WHEN YOU AWAKENED IN THE MORNING OVER THE LAST WEEK, PLEASE INDICATE THE AMOUNT OF TIME IT TAKES UNTIL YOU ARE AS LIMBER AS YOU WILL BE FOR THE DAY: > 1 HOUR
ON A SCALE OF ONE TO TEN, HOW DIFFICULT WAS IT FOR YOU TO COMPLETE THE LISTED DAILY PHYSICAL TASKS OVER THE LAST WEEK: 1.3
ON A SCALE OF ONE TO TEN, CONSIDERING ALL THE WAYS IN WHICH ILLNESS AND HEALTH CONDITIONS MAY AFFECT YOU AT THIS TIME, PLEASE INDICATE BELOW HOW YOU ARE DOING:: 8
ON A SCALE OF ONE TO TEN, HOW MUCH PAIN HAVE YOU HAD BECAUSE OF YOUR CONDITION OVER THE PAST WEEK?: 7
ON A SCALE OF ONE TO TEN, HOW MUCH OF A PROBLEM HAS UNUSUAL FATIGUE OR TIREDNESS BEEN FOR YOU OVER THE PAST WEEK?: 9

## 2022-06-30 NOTE — PROGRESS NOTES
Hamzah Arana M.D.  [address] [place] [address], [address]  Office : [phone], Fax: [phone]      2022    Dear Catie Mello,    Thank you for asking me to assist in the care of your patient Katia Salcedo who was seen in my office on 2022 for evaluation of joint pain. I have included the full consultation note below. I will continue to follow your patient and will forward all future office visit notes to you. If you have any questions or concerns about any aspect of your patient's care, please do not hesitate to contact me. I look forward to continuing to care for this patient with you. Sincerely,    Dr. Sesar Alvarez NOTE  Date of Visit:  2022 8:58 AM    Patient Information:  Name:  Katia Salcedo  :  [id number]  Age:  61 y.o. Gender:  male    PHYSICIAN REQUESTING CONSULTATION:  Dr Jannet Olivarez [facility]    Chief Complaint:  Chief Complaint   Patient presents with    Consultation    Joint Pain         History of Present Illness:  Katia Salcedo is a 61 y.o. male who was referred for evaluation of joint pain. On talking to the patient he states he has had joint problems for more than 5 years. The pain and swelling is in the PIP joints of both the hands with ongoing pain and swelling of the thumb joints as well. He states that several years ago he was told that he has bone spurs involving the C-spine but he has never seen a pain management doctor or a neurosurgeon though. His other current joint complaints are as mentioned below. Medical records were thoroughly reviewed and history was also obtained from patient:      Overall, he says he is feeling \"poor\". Pain: 7/10  Location: Some neck stiffness with pain with neck ROM. Occasional tension headaches. Some para spinal muscle pain. Some right knee pain with swelling with occasional buckling with no warmth and redness.  Bilateral hip pain with no groin pain. Bilateral hip pain with no groin pain. Quality:  Throbbing to sharp pain. Modifying Factors:  Sitting for a length of time worsens the pain. Two Advil and Aleve twice a day. Associated Symptoms:  No tingling, numbness or pain down the arms or legs. Has difficulty bending forwards with some difficulty opening jars. Denies any malar rash, no oral ulcers. Denies any dry eyes, dry mouth with no dysphagia. Denies any h/o DVT's or PE's. No h/o thyroid problems. Was anemic as a child which has since resolved. History Reviewed:    Past Medical History  Past Medical History:   Diagnosis Date    Acute coronary syndrome ([address] 4/29/2016    CAD (coronary artery disease)     MI 1997, CABG 1997, STENTS 2009    Chest pain 4/29/2016    Chronic pain     GERD (gastroesophageal reflux disease)     Heart failure (HCC)     Myocardial infarction, anterior wall ([address] 4/29/2016    Other ill-defined conditions(799.89)     ISCHEMIC CARDIOMYOPATHY EF 35% 2010    Unstable angina ([address] 12/13/2010    Vertigo 4/29/2016       Past Surgical History  Past Surgical History:   Procedure Laterality Date    CARDIAC CATHETERIZATION  7/18/2012    no intervention    ORTHOPEDIC SURGERY      plate n screws in L arm  surgery on R leg    NH CARDIAC SURG PROCEDURE UNLIST      stent x4    NH CARDIAC SURG PROCEDURE UNLIST      cabg x1       Family History  Family History   Problem Relation Age of Onset    Hypertension Maternal Grandmother     Heart Disease Paternal Grandfather     Diabetes Maternal Grandmother     Heart Disease Brother     Diabetes Father     Cancer Father         lung and prostate cancer    Heart Disease Mother [de-identified]        STENTS HEART    Diabetes Mother     Heart Disease Father       No family h/o RA, SLE, MS or type 1 DM. Dad with thyroid problems.      Social History  Social History     Socioeconomic History    Marital status:      Spouse name: Not on file    Number of children: Not on file    Years of education: Not on file    Highest education level: Not on file   Occupational History    Not on file   Tobacco Use    Smoking status: Never Smoker    Smokeless tobacco: Never Used   Substance and Sexual Activity    Alcohol use: Not Currently    Drug use: No    Sexual activity: Not on file   Other Topics Concern    Not on file   Social History Narrative    Not on file     Social Determinants of Health     Financial Resource Strain:     Difficulty of Paying Living Expenses: Not on file   Food Insecurity:     Worried About Running Out of Food in the Last Year: Not on file    [place]ette of Food in the Last Year: Not on file   Transportation Needs:     Lack of Transportation (Medical): Not on file    Lack of Transportation (Non-Medical):  Not on file   Physical Activity:     Days of Exercise per Week: Not on file    Minutes of Exercise per Session: Not on file   Stress:     Feeling of Stress : Not on file   Social Connections:     Frequency of Communication with Friends and Family: Not on file    Frequency of Social Gatherings with Friends and Family: Not on file    Attends Spiritism Services: Not on file    Active Member of [address] [place] [address] or Organizations: Not on file    Attends Club or Organization Meetings: Not on file    Marital Status: Not on file   Intimate Partner Violence:     Fear of Current or Ex-Partner: Not on file    Emotionally Abused: Not on file    Physically Abused: Not on file    Sexually Abused: Not on file   Housing Stability:     Unable to Pay for Housing in the Last Year: Not on file    Number of [place] in the Last Year: Not on file    Unstable Housing in the Last Year: Not on file          Allergy:  Allergies   Allergen Reactions    Esomeprazole Magnesium Other (See Comments)     headache    Niacin Other (See Comments)     SEVERE FLUSHING    Codeine Rash         Current Medications:  Current Outpatient Medications   Medication Sig Dispense Refill    sacubitril-valsartan (ENTRESTO)  MG per tablet Take 1 tablet by mouth 2 times daily      acetaminophen (TYLENOL) 325 MG tablet Take 650 mg by mouth every 4 hours as needed      aspirin 81 MG EC tablet Take by mouth daily      atorvastatin (LIPITOR) 80 MG tablet Take 80 mg by mouth daily      carvedilol (COREG) 25 MG tablet Take 25 mg by mouth every 12 hours      nitroGLYCERIN (NITROSTAT) 0.4 MG SL tablet Place 0.4 mg under the tongue      sildenafil (REVATIO) 20 MG tablet Take 60 mg by mouth daily as needed       No current facility-administered medications for this visit. Review Of Systems: fatigue,weakness, SOB,Cough,Heartburn,sex difficulties,morning stiffness last 2 hours, joint pain in neck, lower back , hands, knees, hips, wrist, headache. Physical Exam:  Blood pressure (!) 144/93, pulse 84, height 6' (1.829 m), weight 245 lb (111.1 kg). General:  Patient alert, cooperative and in no apparent distress. HEENT: Pupils equally reactive to light and accomodation, no scleral injection noted. Skin: Presence of a keloid overlying the scar where he had his bypass done along the sternum. . No nail abnormalities. Cardiac:  Normal rate and rhythm. No murmurs, rubs and gallops appreciated. Lungs: Clear to auscultation bilaterally. Abdomen: Soft, nontender with no hepatosplenomegaly. Neurologic:  Oriented, normal speech and affect. Normal gait. Extremities:  No edema in bilateral lower extremities with no cyanosis or clubbing. Muskoskeletal Exam:     I examined the neck, spine, shoulders, elbows, wrists, MCPs, PIPs, DIPs, knees, hips, ankles and feet bilaterally for strength, range of motion, deformity, tenderness, swelling, and synovitis. The findings are: Does have tenderness on palpation of the right and left 2nd-5th MCP as well as PIP joints with no synovitis, warmth or redness. Flexion as well as extension of the fingers is intact but does have a weak .   Does have tenderness on palpation of the [facility] joint of both thumbs with no synovitis, warmth or redness. Does have tenderness on palpation of the wrist on the dorsum with no synovitis, warmth or redness. Flexion as well as extension of the wrists are intact. Does have tenderness on palpation of the shoulders both anteriorly as well as superiorly with intact range of motion to abduction as well as internal rotation. Does have tenderness on palpation of the C-spine as well as the paraspinal muscles of the neck with tenderness on palpation of the T-spine as well as the L-spine with bilateral SI joint tenderness. Does have tenderness on palpation of the right knee in the mid joint line with no synovitis, warmth or redness. Patient otherwise has a normal joint exam without other evidence of joint tenderness, synovitis, warmth, erythema, decreased ROM, weakness or deformities. Radiology Reports Reviewed (if available):  Last 3 months  [unfilled]    Lab Reports Reviewed (if available): Last 3 months    No results found for this visit on 06/30/22. The results above were reviewed and discussed with patient. Assessment/Plan:   Amy Ayala is a 61 y.o. male who presents with:     1. Degeneration of cervical intervertebral disc: He was started on meloxicam 7.5 mg to be taken once a day after food. While on meloxicam I did instruct him to avoid any over-the-counter NSAID's such as Advil or Aleve. I did emphasize the need for him to take the meloxicam after food since taking the meloxicam on an empty stomach could cause reflux symptoms with abdominal pain. -     meloxicam (MOBIC) 7.5 MG tablet; Take 1 pill once a day after food. 2. Polyarthritis: In light of active synovitis on exam today I did proceed with checking the labs below. I do plan on reviewing the lab results with him on his follow-up visit with me. -     CCP Antibodies, IGG/IGA; Future  -     C-Reactive Protein;  Future  -     Rheumatoid Factor; Future  -     Uric Acid; Future  -     Uric Acid  -     Rheumatoid Factor  -     C-Reactive Protein  -     CCP Antibodies, IGG/IGA    3. Keloid scar: With regard to the keloid scar he has at the site of his cardiac surgery I did recommend that he be seen by a dermatologist to consider intralesional corticosteroid injections to help resolve some of the fibrosis secondary to keloid. I will see the patient back in 4 weeks time to review today's lab results with him. I will see the patient back virtually in 4 weeks time to review today's lab results with him. I appreciate the consult and the opportunity to participate in the care of this patient. Electronically signed by:  Don Roach MD      This note was dictated using dragon voice recognition software.   It has been proofread, but there may still exist voice recognition errors that the author did not detect.          ---------------------------------------------------------------------------------------------------------------------------------------------------------------------------------------------------------------------------------

## 2022-07-01 LAB
CRP SERPL-MCNC: 0.5 MG/DL (ref 0–0.9)
RHEUMATOID FACT SER QL LA: NEGATIVE
URATE SERPL-MCNC: 5 MG/DL (ref 2.6–6)

## 2022-07-06 LAB — CCP IGA+IGG SERPL IA-ACNC: 7 UNITS (ref 0–19)

## 2022-07-27 ENCOUNTER — TELEMEDICINE (OUTPATIENT)
Dept: RHEUMATOLOGY | Age: 57
End: 2022-07-27
Payer: MEDICARE

## 2022-07-27 DIAGNOSIS — Z01.89 ENCOUNTER FOR LABORATORY TEST: ICD-10-CM

## 2022-07-27 DIAGNOSIS — M50.30 DEGENERATION OF CERVICAL INTERVERTEBRAL DISC: Primary | ICD-10-CM

## 2022-07-27 PROCEDURE — 99214 OFFICE O/P EST MOD 30 MIN: CPT | Performed by: INTERNAL MEDICINE

## 2022-07-27 RX ORDER — DULOXETIN HYDROCHLORIDE 30 MG/1
CAPSULE, DELAYED RELEASE ORAL
Qty: 30 CAPSULE | Refills: 0 | Status: SHIPPED | OUTPATIENT
Start: 2022-07-27

## 2022-07-27 NOTE — PROGRESS NOTES
Nimisha Posey M.D.  [address] [place] [address], 9[address] [facility]  Office : [phone], Fax: [phone] OFFICE VISIT NOTE  Date of Visit:  2022 10:59 AM    Patient Information:  Name:  Steven Wilkins  :    Age:  61 y.o. Gender:  male      Mr. Natividad Cockayne is being followed to review labs from the last visit. Last visit: 22    History of Present Illness: On talking to the patient today it does appear that the meloxicam that he was started on at his last visit with me has not made a big difference with relieving his joint pain. All the lab results from the last visit were reviewed with the patient today. His current joint complaints are as mentioned below. Since the last visit, patient is feeling \"poor\". Pain: 7/10  Location: Some neck pain worse than the lower back pain. Some mid back pain. Occasional shoulder blade pain. Bilateral wrist pain from old fractures in them with no swelling with no warmth and redness. Some pain with swelling in the PIP joints with pain with no swelling in the DIP joints with a good  with no difficulty opening jars with some difficulty buttoning and unbuttoning. Some right knee pain with no swelling with no warmth and redness. Occasional buckling of the right knee. Quality:  Deep achy pain. Modifying Factors:  Sitting for a length of time worsens the pain. Movement helps. Associated Symptoms: No tingling, numbness or pain down the arms or legs with intermittent tingling and numbness of the hands with some in the toes. Has been independent with his ADL's.       Current dose of steroids: None  How long on current dose of steroids: N/A  How long on continuous steroid therapy: N/A    Past DMARDs, if applicable (methotrexate, plaquenil/hydroxychloroquine, sulfasalazine, Arava/leflunomide): None    Past biologics, if applicable (enbrel, humira, simponi, cimzia, xeljanz, orencia, remicade, simponi [place], rituximab, Dorena Johnnie, stelara, cosentyx): None    Past NSAIDs, if applicable (motrin, aleve, naproxen, advil, ibuprofen, celebrex, voltaren/diclofenac, etc.): Currently on meloxicam 7.5 mg 1 pill once a day. Last BMD: N/A  Past osteoporosis drugs, if applicable (fosamax, actonel, boniva, reclast, prolia, forteo): None    The patient otherwise has no significant interval changes in health or medical history to report. History Reviewed:    Past Medical History  Past Medical History:   Diagnosis Date    Acute coronary syndrome ([facility] 4/29/2016    CAD (coronary artery disease)     MI 1997, CABG 1997, STENTS 2009    Chest pain 4/29/2016    Chronic pain     GERD (gastroesophageal reflux disease)     Heart failure (HCC)     Myocardial infarction, anterior wall ([facility] 4/29/2016    Other ill-defined conditions(799.89)     ISCHEMIC CARDIOMYOPATHY EF 35% 2010    Unstable angina ([facility] 12/13/2010    Vertigo 4/29/2016       Past Surgical History  Past Surgical History:   Procedure Laterality Date    CARDIAC CATHETERIZATION  7/18/2012    no intervention    ORTHOPEDIC SURGERY      plate n screws in L arm  surgery on R leg    [facility] CARDIAC SURG PROCEDURE UNLIST      stent x4    [place] CARDIAC SURG PROCEDURE UNLIST      cabg x1       Family History  Family History   Problem Relation Age of Onset    Hypertension Maternal Grandmother     Heart Disease Paternal Grandfather     Diabetes Maternal Grandmother     Heart Disease Brother     Diabetes Father     Cancer Father         lung and prostate cancer    Heart Disease Mother [de-identified]        STENTS HEART    Diabetes Mother     Heart Disease Father        Social History  Social History     Socioeconomic History    Marital status:    Tobacco Use    Smoking status: Never    Smokeless tobacco: Never   Substance and Sexual Activity    Alcohol use: Not Currently    Drug use:  No               Allergy:  Allergies   Allergen Reactions    Esomeprazole Magnesium Other (See Comments) headache    Niacin Other (See Comments)     SEVERE FLUSHING    Codeine Rash         Current Medications:  Outpatient Encounter Medications as of 7/27/2022   Medication Sig Dispense Refill    DULoxetine (CYMBALTA) 30 MG extended release capsule Take 1 pill once a day after supper. 30 capsule 0    sacubitril-valsartan (ENTRESTO)  MG per tablet Take 1 tablet by mouth 2 times daily      meloxicam (MOBIC) 7.5 MG tablet Take 1 pill once a day after food. 30 tablet 0    acetaminophen (TYLENOL) 325 MG tablet Take 650 mg by mouth every 4 hours as needed      aspirin 81 MG EC tablet Take by mouth daily      atorvastatin (LIPITOR) 80 MG tablet Take 80 mg by mouth daily      carvedilol (COREG) 25 MG tablet Take 25 mg by mouth every 12 hours      nitroGLYCERIN (NITROSTAT) 0.4 MG SL tablet Place 0.4 mg under the tongue      sildenafil (REVATIO) 20 MG tablet Take 60 mg by mouth daily as needed       No facility-administered encounter medications on file as of 7/27/2022.            REVIEW OF SYSTEMS: The following systems were reviewed with patient today and were negative except for the following (depicted with an \"X\"):        \"X\" General  \"X\" Head and Neck  \"X\" Heart and Breathing  \"X\" Gastrointestinal    Fever/chills   Hair loss  x Shortness of breath   Upset stomach    Falls   Dry mouth  x Coughing   Diarrhea / constipation    Wt loss   Mouth sores   Wheezing  x Heartburn    Wt gain   Ringing ears   Chest pain   Dark or bloody stools    Night sweats   Diff. swallowing   None of above   Nausea or vomiting   X None of above  X None of above      None of above                \"X\" Skin  \"X\" Neurology  \"X\" Urinary/Gyn  \"X\" Other    Easy bruising   Numbness/ tingling   Female problems   Depression    Rashes  x Weakness   Problems with urination   Feeling anxious    Sun sensitivity  x Headaches  X None of above   Problems sleeping   X None of above   None of above     X None of above          Physical Exam:  There were no vitals taken for this visit. General:  Patient alert, cooperative and in no apparent distress. Neurologic:  Oriented, normal speech and affect. Radiology Reports Reviewed (if available):  Last 3 months  [unfilled]    Lab Reports Reviewed (if available): Last 3 months    Office Visit on 06/30/2022   Component Date Value Ref Range Status    Uric Acid 06/30/2022 5.0  2.6 - 6.0 MG/DL Final    Rheumatoid Factor 06/30/2022 Negative  Negative   Final    CRP 06/30/2022 0.5  0.0 - 0.9 mg/dL Final    CCP Antibodies IgG/IgA 06/30/2022 7  0 - 19 units Final    Comment: (NOTE)                           Negative               <20                           Weak positive      20 - 39                           Moderate positive  40 - 59                           Strong positive        >59  Performed At: [facility] & [address] [id number]  Vangie Ascencio MD EO:[id number]           The results above were reviewed and discussed with patient. Assessment/Plan:   Roxi Shah is a 61 y.o. male who presents with:     Degeneration of cervical intervertebral disc: Patient was instructed to stop meloxicam and was started on duloxetine 30 mg to be taken once a day after supper. For the first week to 10 days while on duloxetine he might experience some nausea which was explained to the patient. If he has been able to tolerate the duloxetine and it has not caused any side effects he was instructed to call in a month's time and I will be happy to increase the duloxetine dose to 60 mg once a day. If the duloxetine does not help he was instructed to be in touch with his PCP who would need to refer him to see a spine specialist for a possible MRI of the C-spine with further management of the degenerative arthritis involving his cervical spine.  -     DULoxetine (CYMBALTA) 30 MG extended release capsule; Take 1 pill once a day after supper.     Encounter for laboratory test: The results of his blood test which included the anti-CCP antibody titer, rheumatoid factor, CRP and serum uric acid levels were discussed with the patient in length today. Summer Rubio, was evaluated through a synchronous (real-time) audio-video encounter. The patient (or guardian if applicable) is aware that this is a billable service, which includes applicable co-pays. This Virtual Visit was conducted with patient's (and/or legal guardian's) consent. The visit was conducted pursuant to the emergency declaration under the [address] [place] [address], [address] [place] [address] authority and the [employer] Act. Patient identification was verified, and a caregiver was present when appropriate. The patient was located at home. Provider was located at the office. --Adriana Ramirez MD on 7/27/2022 at 11:01 AM    An electronic signature was used to authenticate this note. Disease activity plan:  As stated above. Steroid management plan:  As stated above, if applicable. Pain management plan:  As stated above, if applicable. Weight management plan:  Weight loss through diet and exercise is always encouraged    Disease prognosis: Good    I appreciate the opportunity to continue to participate in the care of this patient. Follow-up and Dispositions    Return in about 2 months (around 9/27/2022). Electronically signed by:  Bebeto Gann MD      This note was dictated using dragon voice recognition software.   It has been proofread, but there may still exist voice recognition errors that the author did not detect.                --------------------------------------------------------------------------------------------------------------------------------------------------------------------------------------------------------------------------------

## 2022-08-25 RX ORDER — ATORVASTATIN CALCIUM 80 MG/1
80 TABLET, FILM COATED ORAL DAILY
Qty: 90 TABLET | Refills: 3 | Status: SHIPPED | OUTPATIENT
Start: 2022-08-25

## 2022-11-30 ENCOUNTER — OFFICE VISIT (OUTPATIENT)
Dept: CARDIOLOGY CLINIC | Age: 57
End: 2022-11-30
Payer: MEDICARE

## 2022-11-30 VITALS
BODY MASS INDEX: 34.27 KG/M2 | HEART RATE: 80 BPM | SYSTOLIC BLOOD PRESSURE: 132 MMHG | HEIGHT: 72 IN | DIASTOLIC BLOOD PRESSURE: 78 MMHG | WEIGHT: 253 LBS

## 2022-11-30 DIAGNOSIS — Z95.1 S/P CABG X 3: ICD-10-CM

## 2022-11-30 DIAGNOSIS — Z95.1 S/P CABG X 1: ICD-10-CM

## 2022-11-30 DIAGNOSIS — I50.22 CHRONIC SYSTOLIC HEART FAILURE (HCC): ICD-10-CM

## 2022-11-30 DIAGNOSIS — I10 PRIMARY HYPERTENSION: ICD-10-CM

## 2022-11-30 DIAGNOSIS — E78.5 DYSLIPIDEMIA: ICD-10-CM

## 2022-11-30 DIAGNOSIS — I25.10 CORONARY ARTERY DISEASE INVOLVING NATIVE CORONARY ARTERY OF NATIVE HEART WITHOUT ANGINA PECTORIS: Primary | ICD-10-CM

## 2022-11-30 PROCEDURE — 99214 OFFICE O/P EST MOD 30 MIN: CPT | Performed by: INTERNAL MEDICINE

## 2022-11-30 PROCEDURE — 1036F TOBACCO NON-USER: CPT | Performed by: INTERNAL MEDICINE

## 2022-11-30 PROCEDURE — 3074F SYST BP LT 130 MM HG: CPT | Performed by: INTERNAL MEDICINE

## 2022-11-30 PROCEDURE — G8427 DOCREV CUR MEDS BY ELIG CLIN: HCPCS | Performed by: INTERNAL MEDICINE

## 2022-11-30 PROCEDURE — 3078F DIAST BP <80 MM HG: CPT | Performed by: INTERNAL MEDICINE

## 2022-11-30 PROCEDURE — 3017F COLORECTAL CA SCREEN DOC REV: CPT | Performed by: INTERNAL MEDICINE

## 2022-11-30 PROCEDURE — G8484 FLU IMMUNIZE NO ADMIN: HCPCS | Performed by: INTERNAL MEDICINE

## 2022-11-30 PROCEDURE — 93000 ELECTROCARDIOGRAM COMPLETE: CPT | Performed by: INTERNAL MEDICINE

## 2022-11-30 PROCEDURE — G8417 CALC BMI ABV UP PARAM F/U: HCPCS | Performed by: INTERNAL MEDICINE

## 2022-11-30 RX ORDER — SACUBITRIL AND VALSARTAN 97; 103 MG/1; MG/1
1 TABLET, FILM COATED ORAL 2 TIMES DAILY
Qty: 180 TABLET | Refills: 3 | Status: SHIPPED | OUTPATIENT
Start: 2022-11-30

## 2022-11-30 RX ORDER — CARVEDILOL 25 MG/1
25 TABLET ORAL EVERY 12 HOURS
Qty: 180 TABLET | Refills: 3 | Status: SHIPPED | OUTPATIENT
Start: 2022-11-30

## 2022-11-30 RX ORDER — NITROGLYCERIN 0.4 MG/1
0.4 TABLET SUBLINGUAL EVERY 5 MIN PRN
Qty: 25 TABLET | Refills: 11 | Status: SHIPPED | OUTPATIENT
Start: 2022-11-30

## 2022-11-30 ASSESSMENT — ENCOUNTER SYMPTOMS
RESPIRATORY NEGATIVE: 1
SHORTNESS OF BREATH: 0
PHOTOPHOBIA: 0
BACK PAIN: 0
ALLERGIC/IMMUNOLOGIC NEGATIVE: 1
CHEST TIGHTNESS: 0
ABDOMINAL PAIN: 0
EYE PAIN: 0
EYES NEGATIVE: 1
GASTROINTESTINAL NEGATIVE: 1

## 2022-11-30 NOTE — PROGRESS NOTES
[address], [address]  PHONE: [phone]      22    NAME:  Ally Shultz  : 1965  MRN: [id number]         SUBJECTIVE:   Ally Shultz is a 61 y.o. male seen for follow up of:      Chief Complaint   Patient presents with    Coronary Artery Disease        Cardiac Hx (Reviewed and summarized by me):  1) CAD   CABG  - off pump LIMA to LAD  Redo CABG 2021 Christianne Mosqueda) SVG to diag to OM and SVG to PDA, preserved LIMA to LAD. 2) ICM   Echo 21 - LVEF 25-30% mild dilated LV  3) Lipids   21 - HDL 29, LDL 70.8, Trig 156  4) HTN    ECG: SR with ILBBB, NSTW changes noted (Independent review/interpretation by me)      HPI:  58-year-old male with the above cardiac history. In  he had a single-vessel bypass and subsequently presented with acute MI in  he was found to have multiple blockages at that point and was treated with coronary artery bypass grafting by Dr. Saqib Andrew with a vein graft to a diagonal to OM1 and vein graft to the right PDA. Since then has been doing well. His EF remains low and is not interested in ICD. Followed by Dr. Malachi aTi in our office. He is on Entresto and carvedilol. Tolerating these medications well. He denies any heart failure symptoms. Past Medical History, Past Surgical History, Family history, Social History, and Medications were all reviewed with the patient today and updated as necessary.        Current Outpatient Medications:     sacubitril-valsartan (ENTRESTO)  MG per tablet, Take 1 tablet by mouth 2 times daily, Disp: 180 tablet, Rfl: 3    carvedilol (COREG) 25 MG tablet, Take 1 tablet by mouth in the morning and 1 tablet in the evening., Disp: 180 tablet, Rfl: 3    nitroGLYCERIN (NITROSTAT) 0.4 MG SL tablet, Place 1 tablet under the tongue every 5 minutes as needed for Chest pain, Disp: 25 tablet, Rfl: 11    atorvastatin (LIPITOR) 80 MG tablet, Take 1 tablet by mouth daily, Disp: 90 tablet, Rfl: 3    acetaminophen (TYLENOL) 325 MG tablet, Take 650 mg by mouth every 4 hours as needed, Disp: , Rfl:     aspirin 81 MG EC tablet, Take by mouth daily, Disp: , Rfl:     sildenafil (REVATIO) 20 MG tablet, Take 60 mg by mouth daily as needed, Disp: , Rfl:   Allergies   Allergen Reactions    Esomeprazole Magnesium Other (See Comments)     headache    Niacin Other (See Comments)     SEVERE FLUSHING    Codeine Rash     Past Medical History:   Diagnosis Date    Acute coronary syndrome ([address] 4/29/2016    CAD (coronary artery disease)     MI 1997, CABG 1997, STENTS 2009    Chest pain 4/29/2016    Chronic pain     GERD (gastroesophageal reflux disease)     Heart failure (HCC)     Myocardial infarction, anterior wall ([facility] 4/29/2016    Other ill-defined conditions(799.89)     ISCHEMIC CARDIOMYOPATHY EF 35% 2010    Unstable angina ([facility] 12/13/2010    Vertigo 4/29/2016     Past Surgical History:   Procedure Laterality Date    CARDIAC CATHETERIZATION  7/18/2012    no intervention    ORTHOPEDIC SURGERY      plate n screws in L arm  surgery on R leg    [facility]ED[facility]      stent x4    [facility] CARD[facility] PROCEDURE UNLIST      cabg x1     Family History   Problem Relation Age of Onset    Hypertension Maternal Grandmother     Heart Disease Paternal Grandfather     Diabetes Maternal Grandmother     Heart Disease Brother     Diabetes Father     Cancer Father         lung and prostate cancer    Heart Disease Mother [de-identified]        STENTS HEART    Diabetes Mother     Heart Disease Father      Social History     Tobacco Use    Smoking status: Never    Smokeless tobacco: Never   Substance Use Topics    Alcohol use: Not Currently       ROS:  Review of Systems   Constitutional: Negative. Negative for fever. HENT:  Negative for hearing loss, nosebleeds and tinnitus. Eyes: Negative. Negative for photophobia and pain. Respiratory: Negative. Negative for chest tightness and shortness of breath.     Cardiovascular: Negative. Negative for chest pain, palpitations and leg swelling. Gastrointestinal: Negative. Negative for abdominal pain. Endocrine: Negative. Negative for cold intolerance and heat intolerance. Genitourinary: Negative. Negative for dysuria. Musculoskeletal: Negative. Negative for back pain and joint swelling. Skin: Negative. Negative for rash. Allergic/Immunologic: Negative. Negative for immunocompromised state. Neurological: Negative. Negative for dizziness, syncope and light-headedness. Hematological: Negative. Does not bruise/bleed easily. Psychiatric/Behavioral: Negative. Negative for suicidal ideas. PHYSICAL EXAM:  Physical Exam  Constitutional:       General: He is not in acute distress. Appearance: He is not ill-appearing. HENT:      Head: Normocephalic and atraumatic. Nose: No congestion. Mouth/Throat:      Mouth: Mucous membranes are moist.   Eyes:      Extraocular Movements: Extraocular movements intact. Pupils: Pupils are equal, round, and reactive to light. Cardiovascular:      Rate and Rhythm: Normal rate and regular rhythm. Heart sounds: No murmur heard. No friction rub. No gallop. Pulmonary:      Effort: No respiratory distress. Breath sounds: No wheezing or rhonchi. Musculoskeletal:         General: No swelling. Cervical back: Normal range of motion. Right lower leg: No edema. Left lower leg: No edema. Skin:     General: Skin is warm and dry. Findings: No rash. Neurological:      General: No focal deficit present. Mental Status: He is oriented to person, place, and time.    Psychiatric:         Mood and Affect: Mood normal.         Behavior: Behavior normal.         Judgment: Judgment normal.        /78   Pulse 80   Ht 6' (1.829 m)   Wt 253 lb (114.8 kg)   BMI 34.31 kg/m²      Wt Readings from Last 10 Encounters:   11/30/22 253 lb (114.8 kg)   06/30/22 245 lb (111.1 kg)   02/24/22 245 lb (111.1 kg)   11/17/21 239 lb (108.4 kg)   11/02/21 246 lb (111.6 kg)   08/24/21 246 lb (111.6 kg)   06/22/21 238 lb (108 kg)   05/11/21 239 lb 6.4 oz (108.6 kg)   05/07/21 233 lb (105.7 kg)           Medical problems and test results were reviewed with the patient today. Lab Results   Component Value Date/Time    BUN 13 04/20/2021 03:09 AM     No results found for: HERBIE, CREAPOC, CREA  Lab Results   Component Value Date/Time    K 4.4 04/23/2021 03:39 AM       Lab Results   Component Value Date/Time    CHOL 131 04/17/2021 03:25 AM    HDL 29 04/17/2021 03:25 AM       ASSESSMENT and PLAN    ICD-10-CM    1. CAD (coronary artery disease), native coronary artery  I25.10 EKG 12 Lead      2. Chronic systolic heart failure (HCC)  I50.22 Lipid Panel     Transthoracic echocardiogram (TTE) limited with contrast, bubble, strain, and 3D PRN      3. Primary hypertension  I10       4. Dyslipidemia  E78.5       5. S/P CABG x 1  Z95.1       6. S/P CABG x 3  Z95.1           IMPRESSION:  1) CAD - continue ASA 81 mg daily high dose atorvastatin 80 mg  2) sCHF - on coreg 25 mg BID and high dose Entresto  3) Not interested in ICD  4) Recheck lipid panel  5) Re-assess LV function      ALL ORDERS THIS ENCOUNTER  Orders Placed This Encounter    Lipid Panel     Standing Status:   Future     Standing Expiration Date:   11/30/2023    EKG 12 Lead     Order Specific Question:   Reason for Exam?     Answer:   Chest pain    sacubitril-valsartan (ENTRESTO)  MG per tablet     Sig: Take 1 tablet by mouth 2 times daily     Dispense:  180 tablet     Refill:  3    carvedilol (COREG) 25 MG tablet     Sig: Take 1 tablet by mouth in the morning and 1 tablet in the evening. Dispense:  180 tablet     Refill:  3    nitroGLYCERIN (NITROSTAT) 0.4 MG SL tablet     Sig: Place 1 tablet under the tongue every 5 minutes as needed for Chest pain     Dispense:  25 tablet     Refill:  11        Follow up in 6 months.      Thank you for allowing me to participate in this patient's care. Please call or contact me if there are any questions or concerns regarding the above.       Luciana Buckley MD  11/30/22  1:42 PM

## 2023-06-07 LAB
CHOLEST SERPL-MCNC: 176 MG/DL (ref 100–199)
HDLC SERPL-MCNC: 34 MG/DL
LDLC SERPL CALC-MCNC: 119 MG/DL (ref 0–99)
SPECIMEN STATUS REPORT: NORMAL
TRIGL SERPL-MCNC: 125 MG/DL (ref 0–149)
VLDLC SERPL CALC-MCNC: 23 MG/DL (ref 5–40)

## 2023-08-22 ENCOUNTER — INITIAL CONSULT (OUTPATIENT)
Age: 58
End: 2023-08-22
Payer: COMMERCIAL

## 2023-08-22 VITALS
HEART RATE: 84 BPM | WEIGHT: 250 LBS | HEIGHT: 72 IN | BODY MASS INDEX: 33.86 KG/M2 | SYSTOLIC BLOOD PRESSURE: 122 MMHG | DIASTOLIC BLOOD PRESSURE: 82 MMHG

## 2023-08-22 DIAGNOSIS — Z98.61 S/P PTCA (PERCUTANEOUS TRANSLUMINAL CORONARY ANGIOPLASTY): ICD-10-CM

## 2023-08-22 DIAGNOSIS — I21.09 MYOCARDIAL INFARCTION, ANTERIOR WALL (HCC): ICD-10-CM

## 2023-08-22 DIAGNOSIS — I25.118 ATHEROSCLEROTIC HEART DISEASE OF NATIVE CORONARY ARTERY WITH OTHER FORMS OF ANGINA PECTORIS (HCC): Primary | ICD-10-CM

## 2023-08-22 DIAGNOSIS — Z95.1 S/P CABG X 3: ICD-10-CM

## 2023-08-22 DIAGNOSIS — I50.22 CHRONIC SYSTOLIC CONGESTIVE HEART FAILURE, NYHA CLASS 2 (HCC): ICD-10-CM

## 2023-08-22 DIAGNOSIS — I10 PRIMARY HYPERTENSION: ICD-10-CM

## 2023-08-22 DIAGNOSIS — E78.5 DYSLIPIDEMIA: ICD-10-CM

## 2023-08-22 DIAGNOSIS — E66.01 SEVERE OBESITY (HCC): ICD-10-CM

## 2023-08-22 DIAGNOSIS — I25.9 CHRONIC ISCHEMIC HEART DISEASE: ICD-10-CM

## 2023-08-22 DIAGNOSIS — R05.3 CHRONIC COUGH: ICD-10-CM

## 2023-08-22 PROCEDURE — 93000 ELECTROCARDIOGRAM COMPLETE: CPT | Performed by: INTERNAL MEDICINE

## 2023-08-22 PROCEDURE — 3074F SYST BP LT 130 MM HG: CPT | Performed by: INTERNAL MEDICINE

## 2023-08-22 PROCEDURE — 99244 OFF/OP CNSLTJ NEW/EST MOD 40: CPT | Performed by: INTERNAL MEDICINE

## 2023-08-22 PROCEDURE — 3079F DIAST BP 80-89 MM HG: CPT | Performed by: INTERNAL MEDICINE

## 2023-08-22 ASSESSMENT — ENCOUNTER SYMPTOMS
ALLERGIC/IMMUNOLOGIC NEGATIVE: 1
EYES NEGATIVE: 1
RESPIRATORY NEGATIVE: 1
GASTROINTESTINAL NEGATIVE: 1

## 2023-08-22 NOTE — PROGRESS NOTES
dislodgement, inappropriate shock(s), heart attack, stroke, arrhythmia, radiation skin injury, kidney damage/failure oversedation, respiratory arrest, and even death. The patient understands these risks in the context of the potential benefits of the device implantation, and agrees to proceed. The patient has been referred to for an [address] (ICD). The cardiologist has discussed and allowed the patient to ask questions regarding ICD. he was provided with a Shared Decision ICD decision making tool (booklet) today in clinic. DFT  I discussed with the patient the potential risks of DFT tesing including the risk of device/lead failure, lead dislodgement, heart attack, stroke, arrhythmia, oversedation, respiratory arrest, and even death. The patient understands these risks in the context of the potential benefits and agrees to proceed. TOTAL TIME: 45 minutes, >50% during counseling and coordination of care      Patient has been instructed and agrees to call our office with any issues or other concerns related to their cardiac condition(s) and/or complaint(s). No follow-up provider specified. Thank you for allowing me to participate in the electrophysiologic care of Mr. Jabier Hickey. Please contact me if any questions or concerns were to arise. Leonard Rangel MD, MS  Clinical Cardiac Electrophysiology  [facility]  08/22/23  9:34 AM    ===================================================================  Chief Complant:    Chief Complaint   Patient presents with    Consultation    Congestive Heart Failure      Consultation is requested by Kareen Grimes MD for evaluation of Consultation and Congestive Heart Failure    History:  Jabier Hickey is a most pleasant 60 y.o. male with a past medical and cardiac history significant for ICM, EF 20-25%, NYHA class II, CAD s/p CABG here as a referral from Dr. Fara Toussaint for an EP evaluation.  He has a long history

## 2023-11-08 ENCOUNTER — OFFICE VISIT (OUTPATIENT)
Dept: FAMILY MEDICINE CLINIC | Facility: CLINIC | Age: 58
End: 2023-11-08
Payer: COMMERCIAL

## 2023-11-08 VITALS
DIASTOLIC BLOOD PRESSURE: 70 MMHG | OXYGEN SATURATION: 99 % | BODY MASS INDEX: 33.54 KG/M2 | RESPIRATION RATE: 16 BRPM | WEIGHT: 247.6 LBS | HEART RATE: 104 BPM | HEIGHT: 72 IN | SYSTOLIC BLOOD PRESSURE: 125 MMHG | TEMPERATURE: 98.4 F

## 2023-11-08 DIAGNOSIS — S69.90XA INJURY OF WRIST, UNSPECIFIED LATERALITY, INITIAL ENCOUNTER: Primary | ICD-10-CM

## 2023-11-08 DIAGNOSIS — S69.90XA INJURY OF WRIST, UNSPECIFIED LATERALITY, INITIAL ENCOUNTER: ICD-10-CM

## 2023-11-08 PROCEDURE — 99213 OFFICE O/P EST LOW 20 MIN: CPT | Performed by: NURSE PRACTITIONER

## 2023-11-08 PROCEDURE — 3074F SYST BP LT 130 MM HG: CPT | Performed by: NURSE PRACTITIONER

## 2023-11-08 PROCEDURE — 3078F DIAST BP <80 MM HG: CPT | Performed by: NURSE PRACTITIONER

## 2023-11-08 SDOH — ECONOMIC STABILITY: FOOD INSECURITY: WITHIN THE PAST 12 MONTHS, THE FOOD YOU BOUGHT JUST DIDN'T LAST AND YOU DIDN'T HAVE MONEY TO GET MORE.: NEVER TRUE

## 2023-11-08 SDOH — ECONOMIC STABILITY: HOUSING INSECURITY
IN THE LAST 12 MONTHS, WAS THERE A TIME WHEN YOU DID NOT HAVE A STEADY PLACE TO SLEEP OR SLEPT IN A SHELTER (INCLUDING NOW)?: NO

## 2023-11-08 SDOH — ECONOMIC STABILITY: INCOME INSECURITY: HOW HARD IS IT FOR YOU TO PAY FOR THE VERY BASICS LIKE FOOD, HOUSING, MEDICAL CARE, AND HEATING?: NOT HARD AT ALL

## 2023-11-08 SDOH — ECONOMIC STABILITY: FOOD INSECURITY: WITHIN THE PAST 12 MONTHS, YOU WORRIED THAT YOUR FOOD WOULD RUN OUT BEFORE YOU GOT MONEY TO BUY MORE.: NEVER TRUE

## 2023-11-08 ASSESSMENT — PATIENT HEALTH QUESTIONNAIRE - PHQ9
SUM OF ALL RESPONSES TO PHQ QUESTIONS 1-9: 0
1. LITTLE INTEREST OR PLEASURE IN DOING THINGS: 0
SUM OF ALL RESPONSES TO PHQ9 QUESTIONS 1 & 2: 0
SUM OF ALL RESPONSES TO PHQ QUESTIONS 1-9: 0
2. FEELING DOWN, DEPRESSED OR HOPELESS: 0
SUM OF ALL RESPONSES TO PHQ QUESTIONS 1-9: 0
SUM OF ALL RESPONSES TO PHQ QUESTIONS 1-9: 0

## 2023-11-08 ASSESSMENT — ENCOUNTER SYMPTOMS: RESPIRATORY NEGATIVE: 1
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
## 2023-11-08 NOTE — PATIENT INSTRUCTIONS
Apply wrist splint with thumb spica to right wrist. Return in 2 weeks for xray of right wrist.   Apply splint to left wrist    *Rest: rest is vital to protect the injured muscle, tendon, ligament or other tissue from further injury. Resting the injured part is important to promote effective healing. Ice: when icing an injury, choose a cold  Pack, crushed ice or a bag of frozen peas wrapped in a thin towel to provide cold to the injured area. Cold provides short-term pain relief and also limits swelling by reducing blood flow to the injured area. When icing injuries, never appply ice directly to the skin (unless it is moving as in an ice massage) and never leave ice on an injury more than 20 minutes at a time. Longer exposure can damage the skin and even result in frostbite. A good rule is to apply cold for 15 minutes and then leave it off long enough for the skin to re-warm. Compression: compression helps limit and reduce swelling, which may delay healing. Some people also experience pain relief from compression. Elevation: Elevating an injury helps control swelling. *You had an xray today. We have reviewed the images today. All of our x-rays, however, are sent out to be read by radiologist.  We will let you know if there is any change noted to the report.

## 2023-11-27 ENCOUNTER — OFFICE VISIT (OUTPATIENT)
Dept: FAMILY MEDICINE CLINIC | Facility: CLINIC | Age: 58
End: 2023-11-27
Payer: COMMERCIAL

## 2023-11-27 VITALS
HEART RATE: 75 BPM | WEIGHT: 257 LBS | BODY MASS INDEX: 34.81 KG/M2 | HEIGHT: 72 IN | SYSTOLIC BLOOD PRESSURE: 136 MMHG | RESPIRATION RATE: 16 BRPM | OXYGEN SATURATION: 96 % | DIASTOLIC BLOOD PRESSURE: 79 MMHG | TEMPERATURE: 98.1 F

## 2023-11-27 DIAGNOSIS — S69.91XS: ICD-10-CM

## 2023-11-27 DIAGNOSIS — S69.92XS: Primary | ICD-10-CM

## 2023-11-27 DIAGNOSIS — S69.90XA INJURY OF WRIST, UNSPECIFIED LATERALITY, INITIAL ENCOUNTER: ICD-10-CM

## 2023-11-27 PROCEDURE — 3075F SYST BP GE 130 - 139MM HG: CPT | Performed by: NURSE PRACTITIONER

## 2023-11-27 PROCEDURE — 3078F DIAST BP <80 MM HG: CPT | Performed by: NURSE PRACTITIONER

## 2023-11-27 PROCEDURE — 99213 OFFICE O/P EST LOW 20 MIN: CPT | Performed by: NURSE PRACTITIONER

## 2023-11-27 ASSESSMENT — PATIENT HEALTH QUESTIONNAIRE - PHQ9
SUM OF ALL RESPONSES TO PHQ QUESTIONS 1-9: 0
SUM OF ALL RESPONSES TO PHQ QUESTIONS 1-9: 0
1. LITTLE INTEREST OR PLEASURE IN DOING THINGS: 0
2. FEELING DOWN, DEPRESSED OR HOPELESS: 0
SUM OF ALL RESPONSES TO PHQ QUESTIONS 1-9: 0
SUM OF ALL RESPONSES TO PHQ9 QUESTIONS 1 & 2: 0
SUM OF ALL RESPONSES TO PHQ QUESTIONS 1-9: 0

## 2023-11-27 ASSESSMENT — ENCOUNTER SYMPTOMS: RESPIRATORY NEGATIVE: 1

## 2023-11-28 DIAGNOSIS — S69.92XS: ICD-10-CM

## 2023-11-28 DIAGNOSIS — S69.91XS: ICD-10-CM

## 2023-11-28 NOTE — RESULT ENCOUNTER NOTE
Please inform patient their xray did not show any fractures, we will continue with the orthopedic referral. You should hear from them within a week or two  2022

## 2024-04-09 ENCOUNTER — OFFICE VISIT (OUTPATIENT)
Age: 59
End: 2024-04-09
Payer: COMMERCIAL

## 2024-04-09 VITALS
WEIGHT: 252 LBS | BODY MASS INDEX: 34.13 KG/M2 | DIASTOLIC BLOOD PRESSURE: 88 MMHG | SYSTOLIC BLOOD PRESSURE: 138 MMHG | HEART RATE: 88 BPM | HEIGHT: 72 IN

## 2024-04-09 DIAGNOSIS — E78.5 DYSLIPIDEMIA: ICD-10-CM

## 2024-04-09 DIAGNOSIS — Z95.1 S/P CABG X 3: ICD-10-CM

## 2024-04-09 DIAGNOSIS — I25.118 ATHEROSCLEROTIC HEART DISEASE OF NATIVE CORONARY ARTERY WITH OTHER FORMS OF ANGINA PECTORIS (HCC): ICD-10-CM

## 2024-04-09 DIAGNOSIS — I10 PRIMARY HYPERTENSION: ICD-10-CM

## 2024-04-09 DIAGNOSIS — I50.22 CHRONIC SYSTOLIC CONGESTIVE HEART FAILURE, NYHA CLASS 2 (HCC): Primary | ICD-10-CM

## 2024-04-09 PROCEDURE — 99214 OFFICE O/P EST MOD 30 MIN: CPT | Performed by: INTERNAL MEDICINE

## 2024-04-09 PROCEDURE — 3075F SYST BP GE 130 - 139MM HG: CPT | Performed by: INTERNAL MEDICINE

## 2024-04-09 PROCEDURE — 3079F DIAST BP 80-89 MM HG: CPT | Performed by: INTERNAL MEDICINE

## 2024-04-09 RX ORDER — SACUBITRIL AND VALSARTAN 97; 103 MG/1; MG/1
1 TABLET, FILM COATED ORAL 2 TIMES DAILY
Qty: 180 TABLET | Refills: 3 | Status: SHIPPED | OUTPATIENT
Start: 2024-04-09

## 2024-04-09 RX ORDER — CARVEDILOL 25 MG/1
25 TABLET ORAL EVERY 12 HOURS
Qty: 180 TABLET | Refills: 3 | Status: SHIPPED | OUTPATIENT
Start: 2024-04-09

## 2024-04-09 RX ORDER — SILDENAFIL CITRATE 20 MG/1
20 TABLET ORAL PRN
COMMUNITY

## 2024-04-09 RX ORDER — ATORVASTATIN CALCIUM 80 MG/1
80 TABLET, FILM COATED ORAL DAILY
Qty: 90 TABLET | Refills: 3 | Status: SHIPPED | OUTPATIENT
Start: 2024-04-09

## 2024-04-09 ASSESSMENT — ENCOUNTER SYMPTOMS
BACK PAIN: 0
EYE PAIN: 0
RESPIRATORY NEGATIVE: 1
ALLERGIC/IMMUNOLOGIC NEGATIVE: 1
CHEST TIGHTNESS: 0
SHORTNESS OF BREATH: 0
EYES NEGATIVE: 1
GASTROINTESTINAL NEGATIVE: 1
ABDOMINAL PAIN: 0
PHOTOPHOBIA: 0

## 2024-04-09 NOTE — PROGRESS NOTES
intolerance.   Genitourinary: Negative.  Negative for dysuria.   Musculoskeletal: Negative.  Negative for back pain and joint swelling.   Skin: Negative.  Negative for rash.   Allergic/Immunologic: Negative.  Negative for immunocompromised state.   Neurological: Negative.  Negative for dizziness, syncope and light-headedness.   Hematological: Negative.  Does not bruise/bleed easily.   Psychiatric/Behavioral: Negative.  Negative for suicidal ideas.            PHYSICAL EXAM:  Physical Exam  Constitutional:       General: He is not in acute distress.     Appearance: He is not ill-appearing.   HENT:      Head: Normocephalic and atraumatic.      Nose: No congestion.      Mouth/Throat:      Mouth: Mucous membranes are moist.   Eyes:      Extraocular Movements: Extraocular movements intact.      Pupils: Pupils are equal, round, and reactive to light.   Cardiovascular:      Rate and Rhythm: Normal rate and regular rhythm.      Heart sounds: No murmur heard.     No friction rub. No gallop.   Pulmonary:      Effort: No respiratory distress.      Breath sounds: No wheezing or rhonchi.   Musculoskeletal:         General: No swelling.      Cervical back: Normal range of motion.      Right lower leg: No edema.      Left lower leg: No edema.   Skin:     General: Skin is warm and dry.      Findings: No rash.   Neurological:      General: No focal deficit present.      Mental Status: He is oriented to person, place, and time.   Psychiatric:         Mood and Affect: Mood normal.         Behavior: Behavior normal.         Judgment: Judgment normal.          /88   Pulse 88   Ht 1.829 m (6')   Wt 114.3 kg (252 lb)   BMI 34.18 kg/m²      Wt Readings from Last 10 Encounters:   04/09/24 114.3 kg (252 lb)   11/27/23 116.6 kg (257 lb)   11/08/23 112.3 kg (247 lb 9.6 oz)   08/22/23 113.4 kg (250 lb)   06/13/23 115.2 kg (254 lb)   06/07/23 114.8 kg (253 lb)   11/30/22 114.8 kg (253 lb)   06/30/22 111.1 kg (245 lb)   02/24/22 111.1 kg

## 2024-05-23 ENCOUNTER — TELEPHONE (OUTPATIENT)
Age: 59
End: 2024-05-23

## 2024-05-23 NOTE — TELEPHONE ENCOUNTER
Pt came to [facility] office to drop off his yearly long term disability paperwork from his job that Dr Robles completes for him once a year,pt has sent his part of the paperwork to [employer] already he brought the portion for Dr Robles to complete and at the top is the fax number to fax forms once complete please call pt once these have been completed and faxed these need to be completed in sent back to the company within the next couple of weeks,will give to directly to bere

## 2024-05-23 NOTE — TELEPHONE ENCOUNTER
Patient has been on disability since 2019 for his heart condition.    I received Long Term Disability forms to complete for his yearly update.      Patient with h/o MI, s/p CABG & CABG redo. ICM, CHF NYHA class II, EF 20-25% on 6/7/23. ICD recommended.    Forms asking for updates, DX, and if pt will ever be able to return to work.

## 2024-05-24 NOTE — TELEPHONE ENCOUNTER
LT Disability forms reviewed with Dr Robles. He approves to continuation of LTD.    Forms completed and faxed to Jeannette @ [phone]. Fax confirmation received.    Pt was notified. A copy of the LTD forms were sent to medical records to be scanned to the pt's chart.

## 2024-05-24 NOTE — TELEPHONE ENCOUNTER
Hunter Robles MD Limbaugh, Kimberly A  Caller: Unspecified (Yesterday,  1:40 PM)  Yes you can complete the forms as they were filled out last year.    Hunter Robles MD

## 2024-10-09 ENCOUNTER — OFFICE VISIT (OUTPATIENT)
Age: 59
End: 2024-10-09
Payer: MEDICARE

## 2024-10-09 VITALS
DIASTOLIC BLOOD PRESSURE: 72 MMHG | HEART RATE: 87 BPM | HEIGHT: 72 IN | SYSTOLIC BLOOD PRESSURE: 132 MMHG | WEIGHT: 252.8 LBS | BODY MASS INDEX: 34.24 KG/M2

## 2024-10-09 DIAGNOSIS — I10 PRIMARY HYPERTENSION: Primary | ICD-10-CM

## 2024-10-09 DIAGNOSIS — I25.118 ATHEROSCLEROTIC HEART DISEASE OF NATIVE CORONARY ARTERY WITH OTHER FORMS OF ANGINA PECTORIS (HCC): ICD-10-CM

## 2024-10-09 DIAGNOSIS — I50.22 CHRONIC SYSTOLIC CONGESTIVE HEART FAILURE, NYHA CLASS 2 (HCC): ICD-10-CM

## 2024-10-09 PROCEDURE — G8427 DOCREV CUR MEDS BY ELIG CLIN: HCPCS | Performed by: INTERNAL MEDICINE

## 2024-10-09 PROCEDURE — 3078F DIAST BP <80 MM HG: CPT | Performed by: INTERNAL MEDICINE

## 2024-10-09 PROCEDURE — 93000 ELECTROCARDIOGRAM COMPLETE: CPT | Performed by: INTERNAL MEDICINE

## 2024-10-09 PROCEDURE — G8484 FLU IMMUNIZE NO ADMIN: HCPCS | Performed by: INTERNAL MEDICINE

## 2024-10-09 PROCEDURE — 3017F COLORECTAL CA SCREEN DOC REV: CPT | Performed by: INTERNAL MEDICINE

## 2024-10-09 PROCEDURE — 1036F TOBACCO NON-USER: CPT | Performed by: INTERNAL MEDICINE

## 2024-10-09 PROCEDURE — G8417 CALC BMI ABV UP PARAM F/U: HCPCS | Performed by: INTERNAL MEDICINE

## 2024-10-09 PROCEDURE — 3075F SYST BP GE 130 - 139MM HG: CPT | Performed by: INTERNAL MEDICINE

## 2024-10-09 PROCEDURE — 99214 OFFICE O/P EST MOD 30 MIN: CPT | Performed by: INTERNAL MEDICINE

## 2024-10-09 RX ORDER — SPIRONOLACTONE 25 MG/1
25 TABLET ORAL DAILY
Qty: 30 TABLET | Refills: 11 | Status: SHIPPED | OUTPATIENT
Start: 2024-10-09

## 2024-10-09 ASSESSMENT — ENCOUNTER SYMPTOMS
CHEST TIGHTNESS: 0
COUGH: 1
GASTROINTESTINAL NEGATIVE: 1
EYE PAIN: 0
EYES NEGATIVE: 1
ABDOMINAL PAIN: 0
ALLERGIC/IMMUNOLOGIC NEGATIVE: 1
BACK PAIN: 0
PHOTOPHOBIA: 0
SHORTNESS OF BREATH: 1

## 2024-10-09 NOTE — PROGRESS NOTES
[facility] CARDIOLOGY  [address] [place] [address], [address]  [place] [address]  PHONE: [phone]      10/09/24    NAME:  Kevin Amaya  : 1965  MRN: [id number]         SUBJECTIVE:   Kevin Amaya is a 59 y.o. male seen for follow up of:      Chief Complaint   Patient presents with    Coronary Artery Disease        Cardiac Hx (Reviewed and summarized by me):  Followed by Dr Resendiz formerly  1) CAD              CABG  - off pump LIMA to LAD  Redo CABG 2021 ([facility]) SVG to diag to OM and SVG to PDA, preserved LIMA to LAD.  2) ICM - not interested in ICD              Echo 21 - LVEF 25-30% mild dilated LV              Echo 23 - LVEF 20-25% global hypokinesis  3) Lipids              21 - HDL 29, LDL 70.8, Trig 156              23 - HDL 34, , Trig 125  4) HTN    ECG: SR with iLBBB and nonspecific Twave and ST segment changes.    HPI:  C/o mild cough and sob, no LE edema or PND.  Otherwise denies CP.    Past Medical History, Past Surgical History, Family history, Social History, and Medications were all reviewed with the patient today and updated as necessary.       Current Outpatient Medications:     atorvastatin (LIPITOR) 80 MG tablet, Take 1 tablet by mouth daily, Disp: 90 tablet, Rfl: 3    carvedilol (COREG) 25 MG tablet, Take 1 tablet by mouth in the morning and 1 tablet in the evening., Disp: 180 tablet, Rfl: 3    sacubitril-valsartan (ENTRESTO)  MG per tablet, Take 1 tablet by mouth 2 times daily, Disp: 180 tablet, Rfl: 3    nitroGLYCERIN (NITROSTAT) 0.4 MG SL tablet, Place 1 tablet under the tongue every 5 minutes as needed for Chest pain, Disp: 25 tablet, Rfl: 11    acetaminophen (TYLENOL) 325 MG tablet, Take 2 tablets by mouth every 4 hours as needed, Disp: , Rfl:     aspirin 81 MG EC tablet, Take by mouth daily, Disp: , Rfl:     sildenafil (REVATIO) 20 MG tablet, Take 1 tablet by mouth as needed (Patient not taking: Reported on 10/9/2024), Disp: , Rfl:   Allergies

## 2024-10-24 ENCOUNTER — TELEPHONE (OUTPATIENT)
Age: 59
End: 2024-10-24

## 2024-10-24 NOTE — TELEPHONE ENCOUNTER
Pt called in stating he is not able to take the ALDACTONE 25MG the side effects are too much for him,pt did not take this medication today

## 2024-10-24 NOTE — TELEPHONE ENCOUNTER
Pt c/o dizziness, shortness of breath, chest tightness, and headaches since starting spironolactone. Pt states these symptoms were not an issue until starting the medication. Pt states none of these symptoms are similar to what he felt prior to bypass in 1995.     Pt states he does not want to take the medication if it is going to make him feel this bad. Pt states that it eases off after 12 hours, but comes back when he takes the next dose.

## 2024-10-24 NOTE — TELEPHONE ENCOUNTER
Pt informed to stop spironolactone. Appt is already on schedule for 11/14. Pt verbalizes understanding and agrees to plan.

## 2024-11-27 RX ORDER — ATORVASTATIN CALCIUM 80 MG/1
80 TABLET, FILM COATED ORAL DAILY
Qty: 90 TABLET | Refills: 3 | Status: SHIPPED | OUTPATIENT
Start: 2024-11-27

## 2024-11-27 NOTE — TELEPHONE ENCOUNTER
Requested Prescriptions     Pending Prescriptions Disp Refills    atorvastatin (LIPITOR) 80 MG tablet 90 tablet 3     Sig: Take 1 tablet by mouth daily     Verified rx in last OV date 10/09/24. Pharmacy confirmed. Erx as requested.

## 2024-11-27 NOTE — TELEPHONE ENCOUNTER
MEDICATION REFILL REQUEST          Name of Medication:  Lipitor    Dose:  80 mg   Frequency:  once a day         Pharmacy Name/Location:  [facility] #[id number] - [address] -  [phone] - f [phone]

## 2025-03-20 SDOH — ECONOMIC STABILITY: FOOD INSECURITY: WITHIN THE PAST 12 MONTHS, THE FOOD YOU BOUGHT JUST DIDN'T LAST AND YOU DIDN'T HAVE MONEY TO GET MORE.: NEVER TRUE

## 2025-03-20 SDOH — ECONOMIC STABILITY: INCOME INSECURITY: IN THE LAST 12 MONTHS, WAS THERE A TIME WHEN YOU WERE NOT ABLE TO PAY THE MORTGAGE OR RENT ON TIME?: NO

## 2025-03-20 SDOH — ECONOMIC STABILITY: TRANSPORTATION INSECURITY
IN THE PAST 12 MONTHS, HAS LACK OF TRANSPORTATION KEPT YOU FROM MEETINGS, WORK, OR FROM GETTING THINGS NEEDED FOR DAILY LIVING?: NO

## 2025-03-20 SDOH — ECONOMIC STABILITY: TRANSPORTATION INSECURITY
IN THE PAST 12 MONTHS, HAS THE LACK OF TRANSPORTATION KEPT YOU FROM MEDICAL APPOINTMENTS OR FROM GETTING MEDICATIONS?: NO

## 2025-03-20 SDOH — ECONOMIC STABILITY: FOOD INSECURITY: WITHIN THE PAST 12 MONTHS, YOU WORRIED THAT YOUR FOOD WOULD RUN OUT BEFORE YOU GOT MONEY TO BUY MORE.: NEVER TRUE

## 2025-03-20 ASSESSMENT — PATIENT HEALTH QUESTIONNAIRE - PHQ9
SUM OF ALL RESPONSES TO PHQ QUESTIONS 1-9: 0
SUM OF ALL RESPONSES TO PHQ QUESTIONS 1-9: 0
2. FEELING DOWN, DEPRESSED OR HOPELESS: NOT AT ALL
2. FEELING DOWN, DEPRESSED OR HOPELESS: NOT AT ALL
SUM OF ALL RESPONSES TO PHQ QUESTIONS 1-9: 0
SUM OF ALL RESPONSES TO PHQ9 QUESTIONS 1 & 2: 0
1. LITTLE INTEREST OR PLEASURE IN DOING THINGS: NOT AT ALL
SUM OF ALL RESPONSES TO PHQ QUESTIONS 1-9: 0
1. LITTLE INTEREST OR PLEASURE IN DOING THINGS: NOT AT ALL

## 2025-03-21 ENCOUNTER — OFFICE VISIT (OUTPATIENT)
Dept: FAMILY MEDICINE CLINIC | Facility: CLINIC | Age: 60
End: 2025-03-21
Payer: MEDICARE

## 2025-03-21 VITALS
WEIGHT: 257.6 LBS | HEART RATE: 72 BPM | DIASTOLIC BLOOD PRESSURE: 81 MMHG | TEMPERATURE: 98.3 F | RESPIRATION RATE: 16 BRPM | OXYGEN SATURATION: 96 % | HEIGHT: 72 IN | SYSTOLIC BLOOD PRESSURE: 130 MMHG | BODY MASS INDEX: 34.89 KG/M2

## 2025-03-21 DIAGNOSIS — I10 PRIMARY HYPERTENSION: ICD-10-CM

## 2025-03-21 DIAGNOSIS — I25.10 CORONARY ARTERY DISEASE INVOLVING NATIVE CORONARY ARTERY OF NATIVE HEART WITHOUT ANGINA PECTORIS: ICD-10-CM

## 2025-03-21 DIAGNOSIS — E78.2 MIXED HYPERLIPIDEMIA: ICD-10-CM

## 2025-03-21 DIAGNOSIS — E66.811 CLASS 1 OBESITY DUE TO EXCESS CALORIES WITH SERIOUS COMORBIDITY AND BODY MASS INDEX (BMI) OF 34.0 TO 34.9 IN ADULT: ICD-10-CM

## 2025-03-21 DIAGNOSIS — R63.1 POLYDIPSIA: Primary | ICD-10-CM

## 2025-03-21 DIAGNOSIS — Z13.1 SCREENING FOR DIABETES MELLITUS: ICD-10-CM

## 2025-03-21 DIAGNOSIS — E66.09 CLASS 1 OBESITY DUE TO EXCESS CALORIES WITH SERIOUS COMORBIDITY AND BODY MASS INDEX (BMI) OF 34.0 TO 34.9 IN ADULT: ICD-10-CM

## 2025-03-21 DIAGNOSIS — R42 EPISODIC LIGHTHEADEDNESS: ICD-10-CM

## 2025-03-21 DIAGNOSIS — I50.22 CHRONIC SYSTOLIC CONGESTIVE HEART FAILURE, NYHA CLASS 2 (HCC): ICD-10-CM

## 2025-03-21 PROCEDURE — 99214 OFFICE O/P EST MOD 30 MIN: CPT | Performed by: PHYSICIAN ASSISTANT

## 2025-03-21 PROCEDURE — G8427 DOCREV CUR MEDS BY ELIG CLIN: HCPCS | Performed by: PHYSICIAN ASSISTANT

## 2025-03-21 PROCEDURE — 1036F TOBACCO NON-USER: CPT | Performed by: PHYSICIAN ASSISTANT

## 2025-03-21 PROCEDURE — 3079F DIAST BP 80-89 MM HG: CPT | Performed by: PHYSICIAN ASSISTANT

## 2025-03-21 PROCEDURE — 3017F COLORECTAL CA SCREEN DOC REV: CPT | Performed by: PHYSICIAN ASSISTANT

## 2025-03-21 PROCEDURE — 3075F SYST BP GE 130 - 139MM HG: CPT | Performed by: PHYSICIAN ASSISTANT

## 2025-03-21 PROCEDURE — G8417 CALC BMI ABV UP PARAM F/U: HCPCS | Performed by: PHYSICIAN ASSISTANT

## 2025-03-21 ASSESSMENT — ENCOUNTER SYMPTOMS: SHORTNESS OF BREATH: 0

## 2025-03-21 NOTE — PROGRESS NOTES
edema.      Left lower leg: No edema.   Neurological:      Mental Status: He is alert.   Psychiatric:         Mood and Affect: Mood normal.         Behavior: Behavior normal.         Thought Content: Thought content normal.       ASSESSMENT & PLAN    ICD-10-CM    1. Polydipsia  R63.1 Hemoglobin A1C      2. Screening for diabetes mellitus  Z13.1 Hemoglobin A1C      3. Episodic lightheadedness  R42 CBC with Auto Differential     TSH      4. Chronic systolic congestive heart failure, NYHA class 2 (HCC)  I50.22       5. Mixed hyperlipidemia  E78.2 Comprehensive Metabolic Panel     Lipid Panel      6. Coronary artery disease involving native coronary artery of native heart without angina pectoris  I25.10       7. Primary hypertension  I10 Comprehensive Metabolic Panel     TSH      8. Class 1 obesity due to excess calories with serious comorbidity and body mass index (BMI) of 34.0 to 34.9 in adult  E66.811     E66.09     Z68.34            1. Polydipsia  *Will screen for diabetes with fasting labs in the next week.  This will include hemoglobin A1c.  Patient has been noted to have evidence of prediabetes (hemoglobin A1c of 5.9% in 2021).  - Hemoglobin A1C; Future    2. Screening for diabetes mellitus  - Hemoglobin A1C; Future    3. Episodic lightheadedness  *Intermittent episodes.  Will check labs for further evaluation.  *Stressed importance of staying well-hydrated.  - CBC with Auto Differential; Future  - TSH; Future    4. Chronic systolic congestive heart failure, NYHA class 2 (HCC)  *Stable issue.  Following with cardiology.    5. Mixed hyperlipidemia  *Continue atorvastatin 80 mg daily.  *Will check lipid panel with upcoming labs.  - Comprehensive Metabolic Panel; Future  - Lipid Panel; Future    6. Coronary artery disease involving native coronary artery of native heart without angina pectoris  *Stable issue.  Following with cardiology.    7. Primary hypertension  *Appears to be well-controlled on present regimen.

## 2025-03-28 ENCOUNTER — LAB (OUTPATIENT)
Dept: FAMILY MEDICINE CLINIC | Facility: CLINIC | Age: 60
End: 2025-03-28

## 2025-03-28 DIAGNOSIS — Z13.1 SCREENING FOR DIABETES MELLITUS: ICD-10-CM

## 2025-03-28 DIAGNOSIS — E78.2 MIXED HYPERLIPIDEMIA: ICD-10-CM

## 2025-03-28 DIAGNOSIS — R42 EPISODIC LIGHTHEADEDNESS: ICD-10-CM

## 2025-03-28 DIAGNOSIS — R63.1 POLYDIPSIA: ICD-10-CM

## 2025-03-28 DIAGNOSIS — I10 PRIMARY HYPERTENSION: ICD-10-CM

## 2025-03-28 LAB
ALBUMIN SERPL-MCNC: 4 G/DL (ref 3.2–4.6)
ALBUMIN/GLOB SERPL: 1.1 (ref 1–1.9)
ALP SERPL-CCNC: 102 U/L (ref 40–129)
ALT SERPL-CCNC: 47 U/L (ref 8–55)
ANION GAP SERPL CALC-SCNC: 10 MMOL/L (ref 7–16)
AST SERPL-CCNC: 39 U/L (ref 15–37)
BASOPHILS # BLD: 0.04 K/UL (ref 0–0.2)
BASOPHILS NFR BLD: 0.6 % (ref 0–2)
BILIRUB SERPL-MCNC: 0.6 MG/DL (ref 0–1.2)
BUN SERPL-MCNC: 11 MG/DL (ref 8–23)
CALCIUM SERPL-MCNC: 9.8 MG/DL (ref 8.8–10.2)
CHLORIDE SERPL-SCNC: 104 MMOL/L (ref 98–107)
CHOLEST SERPL-MCNC: 176 MG/DL (ref 0–200)
CO2 SERPL-SCNC: 25 MMOL/L (ref 20–29)
CREAT SERPL-MCNC: 0.87 MG/DL (ref 0.8–1.3)
DIFFERENTIAL METHOD BLD: ABNORMAL
EOSINOPHIL # BLD: 0.06 K/UL (ref 0–0.8)
EOSINOPHIL NFR BLD: 0.9 % (ref 0.5–7.8)
ERYTHROCYTE [DISTWIDTH] IN BLOOD BY AUTOMATED COUNT: 13.1 % (ref 11.9–14.6)
EST. AVERAGE GLUCOSE BLD GHB EST-MCNC: 136 MG/DL
GLOBULIN SER CALC-MCNC: 3.6 G/DL (ref 2.3–3.5)
GLUCOSE SERPL-MCNC: 103 MG/DL (ref 70–99)
HBA1C MFR BLD: 6.4 % (ref 0–5.6)
HCT VFR BLD AUTO: 47.2 % (ref 41.1–50.3)
HDLC SERPL-MCNC: 36 MG/DL (ref 40–60)
HDLC SERPL: 4.8 (ref 0–5)
HGB BLD-MCNC: 15.6 G/DL (ref 13.6–17.2)
IMM GRANULOCYTES # BLD AUTO: 0.01 K/UL (ref 0–0.5)
IMM GRANULOCYTES NFR BLD AUTO: 0.2 % (ref 0–5)
LDLC SERPL CALC-MCNC: 105 MG/DL (ref 0–100)
LYMPHOCYTES # BLD: 2.5 K/UL (ref 0.5–4.6)
LYMPHOCYTES NFR BLD: 37.5 % (ref 13–44)
MCH RBC QN AUTO: 28.9 PG (ref 26.1–32.9)
MCHC RBC AUTO-ENTMCNC: 33.1 G/DL (ref 31.4–35)
MCV RBC AUTO: 87.6 FL (ref 82–102)
MONOCYTES # BLD: 0.5 K/UL (ref 0.1–1.3)
MONOCYTES NFR BLD: 7.5 % (ref 4–12)
NEUTS SEG # BLD: 3.55 K/UL (ref 1.7–8.2)
NEUTS SEG NFR BLD: 53.3 % (ref 43–78)
NRBC # BLD: 0 K/UL (ref 0–0.2)
PLATELET # BLD AUTO: 222 K/UL (ref 150–450)
PMV BLD AUTO: 9.1 FL (ref 9.4–12.3)
POTASSIUM SERPL-SCNC: 4.5 MMOL/L (ref 3.5–5.1)
PROT SERPL-MCNC: 7.6 G/DL (ref 6.3–8.2)
RBC # BLD AUTO: 5.39 M/UL (ref 4.23–5.6)
SODIUM SERPL-SCNC: 139 MMOL/L (ref 136–145)
TRIGL SERPL-MCNC: 172 MG/DL (ref 0–150)
TSH, 3RD GENERATION: 3.08 UIU/ML (ref 0.27–4.2)
VLDLC SERPL CALC-MCNC: 34 MG/DL (ref 6–23)
WBC # BLD AUTO: 6.7 K/UL (ref 4.3–11.1)

## 2025-03-31 ENCOUNTER — RESULTS FOLLOW-UP (OUTPATIENT)
Dept: FAMILY MEDICINE CLINIC | Facility: CLINIC | Age: 60
End: 2025-03-31

## 2025-04-07 ENCOUNTER — OFFICE VISIT (OUTPATIENT)
Age: 60
End: 2025-04-07
Payer: MEDICARE

## 2025-04-07 VITALS
SYSTOLIC BLOOD PRESSURE: 124 MMHG | HEIGHT: 72 IN | BODY MASS INDEX: 34.54 KG/M2 | DIASTOLIC BLOOD PRESSURE: 84 MMHG | HEART RATE: 88 BPM | WEIGHT: 255 LBS

## 2025-04-07 DIAGNOSIS — I25.9 CHRONIC ISCHEMIC HEART DISEASE: ICD-10-CM

## 2025-04-07 DIAGNOSIS — I50.22 CHRONIC SYSTOLIC CONGESTIVE HEART FAILURE, NYHA CLASS 2 (HCC): ICD-10-CM

## 2025-04-07 DIAGNOSIS — I10 PRIMARY HYPERTENSION: ICD-10-CM

## 2025-04-07 DIAGNOSIS — I25.118 ATHEROSCLEROTIC HEART DISEASE OF NATIVE CORONARY ARTERY WITH OTHER FORMS OF ANGINA PECTORIS: Primary | ICD-10-CM

## 2025-04-07 PROCEDURE — 3079F DIAST BP 80-89 MM HG: CPT | Performed by: INTERNAL MEDICINE

## 2025-04-07 PROCEDURE — G8417 CALC BMI ABV UP PARAM F/U: HCPCS | Performed by: INTERNAL MEDICINE

## 2025-04-07 PROCEDURE — 3017F COLORECTAL CA SCREEN DOC REV: CPT | Performed by: INTERNAL MEDICINE

## 2025-04-07 PROCEDURE — 3074F SYST BP LT 130 MM HG: CPT | Performed by: INTERNAL MEDICINE

## 2025-04-07 PROCEDURE — 1036F TOBACCO NON-USER: CPT | Performed by: INTERNAL MEDICINE

## 2025-04-07 PROCEDURE — G8427 DOCREV CUR MEDS BY ELIG CLIN: HCPCS | Performed by: INTERNAL MEDICINE

## 2025-04-07 PROCEDURE — 99214 OFFICE O/P EST MOD 30 MIN: CPT | Performed by: INTERNAL MEDICINE

## 2025-04-07 RX ORDER — CARVEDILOL 25 MG/1
25 TABLET ORAL EVERY 12 HOURS
Qty: 180 TABLET | Refills: 3 | Status: SHIPPED | OUTPATIENT
Start: 2025-04-07

## 2025-04-07 RX ORDER — SACUBITRIL AND VALSARTAN 97; 103 MG/1; MG/1
1 TABLET, FILM COATED ORAL 2 TIMES DAILY
Qty: 180 TABLET | Refills: 3 | Status: SHIPPED | OUTPATIENT
Start: 2025-04-07

## 2025-04-07 ASSESSMENT — ENCOUNTER SYMPTOMS
RESPIRATORY NEGATIVE: 1
EYE PAIN: 0
ALLERGIC/IMMUNOLOGIC NEGATIVE: 1
EYES NEGATIVE: 1
CHEST TIGHTNESS: 0
GASTROINTESTINAL NEGATIVE: 1
PHOTOPHOBIA: 0
SHORTNESS OF BREATH: 0
BACK PAIN: 0
ABDOMINAL PAIN: 0

## 2025-04-07 NOTE — PROGRESS NOTES
jardiance 10 mg daily  SGLT2i - failed spironolactone due to feeling faint  3) Offered ICD - but pt is not interested  4) hyperlipidemia reemphasized diet and exercise, continue high dose statin         ALL ORDERS THIS ENCOUNTER  Orders Placed This Encounter    carvedilol (COREG) 25 MG tablet     Sig: Take 1 tablet by mouth in the morning and 1 tablet in the evening.     Dispense:  180 tablet     Refill:  3    sacubitril-valsartan (ENTRESTO)  MG per tablet     Sig: Take 1 tablet by mouth 2 times daily     Dispense:  180 tablet     Refill:  3    empagliflozin (JARDIANCE) 10 MG tablet     Sig: Take 1 tablet by mouth daily     Dispense:  30 tablet     Refill:  11        Follow up in 6 months.     Thank you for allowing me to participate in this patient's care.  Please call or contact me if there are any questions or concerns regarding the above.      Hunter Robles MD  04/07/25  2:21 PM

## 2025-05-07 ENCOUNTER — OFFICE VISIT (OUTPATIENT)
Dept: FAMILY MEDICINE CLINIC | Facility: CLINIC | Age: 60
End: 2025-05-07
Payer: MEDICARE

## 2025-05-07 VITALS
WEIGHT: 249.3 LBS | DIASTOLIC BLOOD PRESSURE: 81 MMHG | BODY MASS INDEX: 33.77 KG/M2 | HEIGHT: 72 IN | TEMPERATURE: 98.1 F | OXYGEN SATURATION: 96 % | SYSTOLIC BLOOD PRESSURE: 139 MMHG | HEART RATE: 76 BPM

## 2025-05-07 DIAGNOSIS — M25.561 RIGHT KNEE PAIN, UNSPECIFIED CHRONICITY: Primary | ICD-10-CM

## 2025-05-07 PROCEDURE — 1036F TOBACCO NON-USER: CPT | Performed by: PHYSICIAN ASSISTANT

## 2025-05-07 PROCEDURE — 3017F COLORECTAL CA SCREEN DOC REV: CPT | Performed by: PHYSICIAN ASSISTANT

## 2025-05-07 PROCEDURE — 3075F SYST BP GE 130 - 139MM HG: CPT | Performed by: PHYSICIAN ASSISTANT

## 2025-05-07 PROCEDURE — 20610 DRAIN/INJ JOINT/BURSA W/O US: CPT | Performed by: PHYSICIAN ASSISTANT

## 2025-05-07 PROCEDURE — G8417 CALC BMI ABV UP PARAM F/U: HCPCS | Performed by: PHYSICIAN ASSISTANT

## 2025-05-07 PROCEDURE — 3079F DIAST BP 80-89 MM HG: CPT | Performed by: PHYSICIAN ASSISTANT

## 2025-05-07 PROCEDURE — G8427 DOCREV CUR MEDS BY ELIG CLIN: HCPCS | Performed by: PHYSICIAN ASSISTANT

## 2025-05-07 PROCEDURE — 99213 OFFICE O/P EST LOW 20 MIN: CPT | Performed by: PHYSICIAN ASSISTANT

## 2025-05-07 RX ORDER — LIDOCAINE HYDROCHLORIDE 10 MG/ML
4 INJECTION, SOLUTION INFILTRATION; PERINEURAL ONCE
Status: DISCONTINUED | OUTPATIENT
Start: 2025-05-07 | End: 2025-05-07

## 2025-05-07 RX ORDER — LIDOCAINE HYDROCHLORIDE 20 MG/ML
4 INJECTION, SOLUTION EPIDURAL; INFILTRATION; INTRACAUDAL; PERINEURAL ONCE
Status: COMPLETED | OUTPATIENT
Start: 2025-05-07 | End: 2025-05-07

## 2025-05-07 RX ORDER — TRIAMCINOLONE ACETONIDE 40 MG/ML
40 INJECTION, SUSPENSION INTRA-ARTICULAR; INTRAMUSCULAR ONCE
Status: COMPLETED | OUTPATIENT
Start: 2025-05-07 | End: 2025-05-07

## 2025-05-07 RX ADMIN — TRIAMCINOLONE ACETONIDE 40 MG: 40 INJECTION, SUSPENSION INTRA-ARTICULAR; INTRAMUSCULAR at 14:57

## 2025-05-07 RX ADMIN — LIDOCAINE HYDROCHLORIDE 4 ML: 20 INJECTION, SOLUTION EPIDURAL; INFILTRATION; INTRACAUDAL; PERINEURAL at 16:36

## 2025-05-07 ASSESSMENT — ENCOUNTER SYMPTOMS: SHORTNESS OF BREATH: 0

## 2025-05-07 NOTE — PATIENT INSTRUCTIONS
*You had an xray today.  We have reviewed the images today.  All of our x-rays, however, are sent out to be read by radiologist.  We will let you know if there is any change noted to the report.  *Recommend Tylenol as needed. Use the Voltaren gel as well.  *Continue with knee sleeve/brace with activity.  *Elevate and ice the knee for 10-15 minutes, 2-3 times a day as able.  *You were given a steroid injection today. Sometimes this can cause a flare of discomfort the day after. You can apply ice (for 10 minutes at a time) and use the Tylenol if needed. Typically it takes 24-48 hours before the steroid \"kicks in\" and provides relief of the symptoms.

## 2025-05-07 NOTE — PROGRESS NOTES
[facility] [address] [address]  Phone [phone]      Patient: Kevin Amaya  YOB: 1965  Age 60 y.o.  Sex male  Medical Record:  [id number]  Visit Date: 05/07/25  Author:  Arvind Robles PA-C    [facility] Note    Chief Complaint   Patient presents with    Knee Pain     Fluid on right knee-several weeks. No trauma       History of Present Illness  History of Present Illness  The patient is a 60-year-old male who presents today with complaints of pain and swelling of his right knee. He reports that this has been ongoing for several weeks and does not recall any preceding injury or trauma.    He has been experiencing persistent discomfort and swelling in his right knee, which he describes as a sensation of bone-on-bone contact during ambulation.  He began using a neoprene knee brace but has not really noted much improvement in his symptoms. The discomfort is characterized by a throbbing sensation, akin to a toothache, which disrupts his sleep. The pain is predominantly localized on the lateral aspect of the knee, making weight-bearing difficult.  Going up and down stairs seems to exacerbate the discomfort.  Going down seems to be worse than going up.  He denies painful locking but has had some sensation of buckling due to the discomfort.  He has had some uncomfortable popping sensations of the knee.  He has been taking over-the-counter ibuprofen on average of 3 to 4 tablets a day.  This provides only minimal, temporary relief.      Past History:    Past Medical history   Past Medical History:   Diagnosis Date    Acute coronary syndrome (HCC) 4/29/2016    CAD (coronary artery disease)     MI 1997, CABG 1997, STENTS 2009    Chest pain 4/29/2016    Chronic pain     GERD (gastroesophageal reflux disease)     Heart failure (HCC)     Myocardial infarction, anterior wall ([facility]) 4/29/2016    Other ill-defined conditions(799.89)     ISCHEMIC

## 2025-05-09 ENCOUNTER — RESULTS FOLLOW-UP (OUTPATIENT)
Dept: FAMILY MEDICINE CLINIC | Facility: CLINIC | Age: 60
End: 2025-05-09

## (undated) DEVICE — APPLIER RMFG CLP LIG SM 9IN --

## (undated) DEVICE — SUT SLK 0 30IN CT1 BLK --

## (undated) DEVICE — STERILE HOOK LOCK LATEX FREE ELASTIC BANDAGE 6INX5YD: Brand: HOOK LOCK™

## (undated) DEVICE — CONNECTOR IV 3/8X3/8X3/8 Y

## (undated) DEVICE — SUTURE PERMAHAND SZ 2-0 L12X18IN NONABSORBABLE BLK SILK A185H

## (undated) DEVICE — APPLIER CLP L9.38IN M LIG TI DISP STR RNG HNDL LIGACLP

## (undated) DEVICE — BLADE SAW W10XL54MM FOR PRI REPEAT STRNOTMY

## (undated) DEVICE — SUTURE VCRL SZ 3-0 L27IN ABSRB UD L24MM PS-1 3/8 CIR PRIM J936H

## (undated) DEVICE — BLADE OPHTH 3MM CUT EDGE NDL

## (undated) DEVICE — 3000CC GUARDIAN II: Brand: GUARDIAN

## (undated) DEVICE — STERILE HOOK LOCK LATEX FREE ELASTIC BANDAGE 4INX5YD: Brand: HOOK LOCK™

## (undated) DEVICE — BANDAGE,GAUZE,BULKEE II,4.5"X4.1YD,STRL: Brand: MEDLINE

## (undated) DEVICE — SUTURE VCRL 3-0 L36IN ABSRB UD CT L40MM 1/2 CIR TAPERPOINT J956H

## (undated) DEVICE — 48" PROBE COVER W/GEL, ULTRASOUND, STERILE: Brand: SITE-RITE

## (undated) DEVICE — APPLIER RMFG CLP LIG MED 23.8 --

## (undated) DEVICE — CANNULA PERF L1.8MM TIP L1MM S STL SHFT BLB SHP TIP FEM

## (undated) DEVICE — AMD ANTIMICROBIAL GAUZE SPONGES,12 PLY USP TYPE VII, 0.2% POLYHEXAMETHYLENE BIGUANIDE HCI (PHMB): Brand: CURITY

## (undated) DEVICE — CLAMP INSERT: Brand: STEALTH® CLAMP INSERT

## (undated) DEVICE — PRESSURE MONITORING LINES 6FT. M/M: Brand: PRESSURE MONITORING LINES

## (undated) DEVICE — SUT PROL 4-0 36IN RB1 DA BLU --

## (undated) DEVICE — Device

## (undated) DEVICE — 3M™ TEGADERM™ TRANSPARENT FILM DRESSING FRAME STYLE, 1624W, 2-3/8 IN X 2-3/4 IN (6 CM X 7 CM), 100/CT 4CT/CASE: Brand: 3M™ TEGADERM™

## (undated) DEVICE — BLADE SCALP SURG BARD-PARK 10 --

## (undated) DEVICE — SOLUTION IRRIG 1000ML LAC RINGER PLAS POUR BTL

## (undated) DEVICE — CATHETER THOR 32FR L23IN PVC 6 EYELET STR ATRAUM

## (undated) DEVICE — PAD,NON-ADHERENT,3X8,STERILE,LF,1/PK: Brand: MEDLINE

## (undated) DEVICE — Device: Brand: JELCO

## (undated) DEVICE — OXYGENATOR 1.5 M2 SURF AREA 0.5 TO 0.5 LPM FLO RATE W/

## (undated) DEVICE — DRAPE SLUSH DISC W44XL66IN ST FOR RND BSIN HUSH SLUSH SYS

## (undated) DEVICE — SUTURE NONABSORBABLE MONOFILAMENT 5-0 C-1 1X24 IN PROLENE 8725H

## (undated) DEVICE — BUTTON SWITCH PENCIL BLADE ELECTRODE, HOLSTER: Brand: EDGE

## (undated) DEVICE — PLEDGET VASC W3/16XL3/8IN THK1/16IN PTFE SFT

## (undated) DEVICE — SUTURE ETHBND EXCEL SZ 3-0 L36IN NONABSORBABLE GRN RB-1 X558H

## (undated) DEVICE — AORTIC PUNCHES ARE USED TO CREATE A UNIFORM OPENING IN BLOOD VESSELS DURING CARDIOVASCULAR SURGERY. THE VESSEL GRAFT IS INSERTED INTO THE CREATED OPENING AND SUTURED TO THE VESSEL WALL. AORTIC LANCETS ARE USED TO MAKE A SMALL UNIFORM CUT IN A BLOOD VESSEL TO FACILITATE INSERTION OF AN AORTIC PUNCH.  PUNCHES COME IN VARIOUS LENGTHS, DIAMETERS AND TIP CONFIGURATIONS.: Brand: CLEANCUT ROTATING AORTIC PUNCH

## (undated) DEVICE — PERFUSION PACK XCOATING [76320] [TERUMO CARDIOVASCULAR]

## (undated) DEVICE — AUTOTRANSFUSION KIT 120 MH FAST STRT AT1 FOR CATS +

## (undated) DEVICE — STRIP,CLOSURE,WOUND,MEDI-STRIP,1/2X4: Brand: MEDLINE

## (undated) DEVICE — REM POLYHESIVE ADULT PATIENT RETURN ELECTRODE: Brand: VALLEYLAB

## (undated) DEVICE — SUTURE COAT VCRL SZ 0 L36IN ABSRB VLT CTX L48MM TAPERPOINT J370H

## (undated) DEVICE — SUTURE SILK PERMAHAND PRECUT 6 X 30 IN SZ 1 BLK BRAID A307H

## (undated) DEVICE — CANNULA INJ L2.5IN BLNT TIP 3MM CLR BODY W/ 1 W VLV DLP

## (undated) DEVICE — SUTURE S STL SZ 5 L18IN NONABSORBABLE SIL CCS L48MM 1/2 CIR M653G

## (undated) DEVICE — SUTURE PROL SZ 6-0 L30IN NONABSORBABLE BLU L13MM CC-1 3/8 M8707

## (undated) DEVICE — CATHETER THOR 32FR L23IN PVC 5 EYELET STR ATRAUM

## (undated) DEVICE — BLADE SURG NO20 S STL STR DISP GLASSVAN

## (undated) DEVICE — CABG DR DENNIS: Brand: MEDLINE INDUSTRIES, INC.

## (undated) DEVICE — SUTURE ETHBND EXCEL SZ 0 L18IN NONABSORBABLE GRN L36MM CT-1 CX21D

## (undated) DEVICE — BLADE RMFG SAG STRYKR 19.5X88X --

## (undated) DEVICE — 3M™ IOBAN™ 2 ANTIMICROBIAL INCISE DRAPE 6648EZ: Brand: IOBAN™ 2

## (undated) DEVICE — SOLUTION IV 1000ML 0.9% SOD CHL

## (undated) DEVICE — CATH URETH INTMIT ROB 14FR FUN -- USE ITEM 179521

## (undated) DEVICE — WAX SURG 2.5GM HEMSTAT BNE BEESWAX PARAFFIN ISO PALMITATE

## (undated) DEVICE — BLADE SCALP SURG BARD-PARK 11 --

## (undated) DEVICE — CATHETER ETER TY TEMP SENS F MBO W URIN M FOLLOWS CDC GUIDELINES TO

## (undated) DEVICE — SUTURE PERMAHAND SZ 0 L30IN NONABSORBABLE BLK L30MM PSL 3/8 590H

## (undated) DEVICE — SUTURE NONABSORBABLE L24IN SZ 7-0 M0-5 BV175-8 EP 24 BLU M8745

## (undated) DEVICE — INTENDED TO BE USED TO OCCLUDE, RETRACT AND IDENTIFY ARTERIES, VEINS, TENDONS AND NERVES IN SURGICAL PROCEDURES: Brand: STERION®  VESSEL LOOP

## (undated) DEVICE — BASIC SINGLE BASIN-LF: Brand: MEDLINE INDUSTRIES, INC.

## (undated) DEVICE — MEDI-TRACE CADENCE ADULT, DEFIBRILLATION ELECTRODE -RTS  (10 PR/PK) - ZOLL: Brand: MEDI-TRACE CADENCE

## (undated) DEVICE — SPONGE LAP 18X18IN STRL -- 5/PK

## (undated) DEVICE — SOLUTION IRRIG 3000ML 0.9% SOD CHL FLX CONT 0797208] ICU MEDICAL INC]

## (undated) DEVICE — PREP SKN CHLRAPRP APL 26ML STR --

## (undated) DEVICE — SUTURE ETHBND EXCEL 2-0 L30IN NONABSORBABLE GRN L26MM SH MX563

## (undated) DEVICE — APPLIER CLP L9.375IN APER 2.1MM CLS L3.8MM 20 SM TI CLP

## (undated) DEVICE — SUTURE PROL SZ 7-0 L24IN NONABSORBABLE BLU L9.3MM BV-1 3/8 M8702